# Patient Record
Sex: MALE | Race: BLACK OR AFRICAN AMERICAN | NOT HISPANIC OR LATINO | URBAN - METROPOLITAN AREA
[De-identification: names, ages, dates, MRNs, and addresses within clinical notes are randomized per-mention and may not be internally consistent; named-entity substitution may affect disease eponyms.]

---

## 2023-10-06 ENCOUNTER — HOSPITAL ENCOUNTER (EMERGENCY)
Facility: HOSPITAL | Age: 83
Discharge: HOME | End: 2023-10-06
Attending: EMERGENCY MEDICINE
Payer: COMMERCIAL

## 2023-10-06 ENCOUNTER — APPOINTMENT (OUTPATIENT)
Dept: RADIOLOGY | Facility: HOSPITAL | Age: 83
End: 2023-10-06
Payer: COMMERCIAL

## 2023-10-06 VITALS
HEART RATE: 71 BPM | WEIGHT: 182 LBS | SYSTOLIC BLOOD PRESSURE: 141 MMHG | OXYGEN SATURATION: 100 % | DIASTOLIC BLOOD PRESSURE: 84 MMHG | HEIGHT: 67 IN | TEMPERATURE: 97.2 F | BODY MASS INDEX: 28.56 KG/M2 | RESPIRATION RATE: 18 BRPM

## 2023-10-06 DIAGNOSIS — F03.A0 MILD DEMENTIA WITHOUT BEHAVIORAL DISTURBANCE, PSYCHOTIC DISTURBANCE, MOOD DISTURBANCE, OR ANXIETY, UNSPECIFIED DEMENTIA TYPE (MULTI): Primary | ICD-10-CM

## 2023-10-06 LAB
ALBUMIN SERPL BCP-MCNC: 4.3 G/DL (ref 3.4–5)
ALP SERPL-CCNC: 103 U/L (ref 33–136)
ALT SERPL W P-5'-P-CCNC: 10 U/L (ref 10–52)
ANION GAP SERPL CALC-SCNC: 16 MMOL/L (ref 10–20)
APPEARANCE UR: CLEAR
AST SERPL W P-5'-P-CCNC: 15 U/L (ref 9–39)
BASOPHILS # BLD AUTO: 0.04 X10*3/UL (ref 0–0.1)
BASOPHILS NFR BLD AUTO: 0.5 %
BILIRUB SERPL-MCNC: 0.6 MG/DL (ref 0–1.2)
BILIRUB UR STRIP.AUTO-MCNC: NEGATIVE MG/DL
BUN SERPL-MCNC: 7 MG/DL (ref 6–23)
CALCIUM SERPL-MCNC: 8.7 MG/DL (ref 8.6–10.3)
CARDIAC TROPONIN I PNL SERPL HS: 4 NG/L (ref 0–20)
CHLORIDE SERPL-SCNC: 104 MMOL/L (ref 98–107)
CO2 SERPL-SCNC: 26 MMOL/L (ref 21–32)
COLOR UR: YELLOW
CREAT SERPL-MCNC: 0.84 MG/DL (ref 0.5–1.3)
EOSINOPHIL # BLD AUTO: 0.21 X10*3/UL (ref 0–0.4)
EOSINOPHIL NFR BLD AUTO: 2.7 %
ERYTHROCYTE [DISTWIDTH] IN BLOOD BY AUTOMATED COUNT: 13.6 % (ref 11.5–14.5)
FLUAV RNA RESP QL NAA+PROBE: NOT DETECTED
FLUBV RNA RESP QL NAA+PROBE: NOT DETECTED
GFR SERPL CREATININE-BSD FRML MDRD: 87 ML/MIN/1.73M*2
GLUCOSE SERPL-MCNC: 213 MG/DL (ref 74–99)
GLUCOSE UR STRIP.AUTO-MCNC: NEGATIVE MG/DL
HCT VFR BLD AUTO: 35.2 % (ref 41–52)
HGB BLD-MCNC: 12.2 G/DL (ref 13.5–17.5)
IMM GRANULOCYTES # BLD AUTO: 0.04 X10*3/UL (ref 0–0.5)
IMM GRANULOCYTES NFR BLD AUTO: 0.5 % (ref 0–0.9)
KETONES UR STRIP.AUTO-MCNC: NEGATIVE MG/DL
LEUKOCYTE ESTERASE UR QL STRIP.AUTO: NEGATIVE
LYMPHOCYTES # BLD AUTO: 2.93 X10*3/UL (ref 0.8–3)
LYMPHOCYTES NFR BLD AUTO: 37.5 %
MCH RBC QN AUTO: 27.8 PG (ref 26–34)
MCHC RBC AUTO-ENTMCNC: 34.7 G/DL (ref 32–36)
MCV RBC AUTO: 80 FL (ref 80–100)
MONOCYTES # BLD AUTO: 0.64 X10*3/UL (ref 0.05–0.8)
MONOCYTES NFR BLD AUTO: 8.2 %
NEUTROPHILS # BLD AUTO: 3.96 X10*3/UL (ref 1.6–5.5)
NEUTROPHILS NFR BLD AUTO: 50.6 %
NITRITE UR QL STRIP.AUTO: NEGATIVE
NRBC BLD-RTO: 0 /100 WBCS (ref 0–0)
PH UR STRIP.AUTO: 7 [PH]
PLATELET # BLD AUTO: 286 X10*3/UL (ref 150–450)
PMV BLD AUTO: 10.7 FL (ref 7.5–11.5)
POTASSIUM SERPL-SCNC: 3.8 MMOL/L (ref 3.5–5.3)
PROT SERPL-MCNC: 6.4 G/DL (ref 6.4–8.2)
PROT UR STRIP.AUTO-MCNC: NEGATIVE MG/DL
RBC # BLD AUTO: 4.39 X10*6/UL (ref 4.5–5.9)
RBC # UR STRIP.AUTO: NEGATIVE /UL
SARS-COV-2 RNA RESP QL NAA+PROBE: NOT DETECTED
SODIUM SERPL-SCNC: 142 MMOL/L (ref 136–145)
SP GR UR STRIP.AUTO: 1.01
UROBILINOGEN UR STRIP.AUTO-MCNC: <2 MG/DL
WBC # BLD AUTO: 7.8 X10*3/UL (ref 4.4–11.3)

## 2023-10-06 PROCEDURE — 87636 SARSCOV2 & INF A&B AMP PRB: CPT | Performed by: EMERGENCY MEDICINE

## 2023-10-06 PROCEDURE — 36415 COLL VENOUS BLD VENIPUNCTURE: CPT | Performed by: EMERGENCY MEDICINE

## 2023-10-06 PROCEDURE — 84075 ASSAY ALKALINE PHOSPHATASE: CPT | Performed by: EMERGENCY MEDICINE

## 2023-10-06 PROCEDURE — 71045 X-RAY EXAM CHEST 1 VIEW: CPT | Performed by: RADIOLOGY

## 2023-10-06 PROCEDURE — 85025 COMPLETE CBC W/AUTO DIFF WBC: CPT | Performed by: EMERGENCY MEDICINE

## 2023-10-06 PROCEDURE — 84484 ASSAY OF TROPONIN QUANT: CPT | Performed by: EMERGENCY MEDICINE

## 2023-10-06 PROCEDURE — 70450 CT HEAD/BRAIN W/O DYE: CPT | Mod: MG

## 2023-10-06 PROCEDURE — 71045 X-RAY EXAM CHEST 1 VIEW: CPT

## 2023-10-06 PROCEDURE — 81003 URINALYSIS AUTO W/O SCOPE: CPT | Performed by: EMERGENCY MEDICINE

## 2023-10-06 PROCEDURE — 70450 CT HEAD/BRAIN W/O DYE: CPT | Performed by: RADIOLOGY

## 2023-10-06 PROCEDURE — 99284 EMERGENCY DEPT VISIT MOD MDM: CPT | Performed by: EMERGENCY MEDICINE

## 2023-10-06 ASSESSMENT — COLUMBIA-SUICIDE SEVERITY RATING SCALE - C-SSRS
6. HAVE YOU EVER DONE ANYTHING, STARTED TO DO ANYTHING, OR PREPARED TO DO ANYTHING TO END YOUR LIFE?: NO
1. IN THE PAST MONTH, HAVE YOU WISHED YOU WERE DEAD OR WISHED YOU COULD GO TO SLEEP AND NOT WAKE UP?: NO
2. HAVE YOU ACTUALLY HAD ANY THOUGHTS OF KILLING YOURSELF?: NO

## 2023-10-06 ASSESSMENT — PAIN - FUNCTIONAL ASSESSMENT: PAIN_FUNCTIONAL_ASSESSMENT: 0-10

## 2023-10-06 ASSESSMENT — PAIN SCALES - GENERAL: PAINLEVEL_OUTOF10: 0 - NO PAIN

## 2023-10-06 NOTE — ED PROVIDER NOTES
HPI   Chief Complaint   Patient presents with    Dizziness     x1week    Altered Mental Status     [id number]       83-year-old man brought in by family with increased confusion.  Patient lives alone and family is concerned that he seems to be more confused.  He has been found wandering in the streets.  He has no complaints.  No fever or chills.  No chest pain or shortness of breath or abdominal pain.      I ordered testing to look for reversible causes of his confusion.  Patient states labs are within normal limits including normal CBC, normal urinalysis and normal electrolytes and liver function test.  I will explain to family that he does not have any medical reason to be hospitalized and that they may need to consider additional services if he is going to consider staying in his home.                    No data recorded                Patient History   Past Medical History:   Diagnosis Date    Diabetes mellitus (CMS/HCC)     Hypertension      Past Surgical History:   Procedure Laterality Date    CT HEAD ANGIO W AND WO IV CONTRAST  3/2/2017    CT HEAD ANGIO W AND WO IV CONTRAST [place] [facility] [place]    CT NECK ANGIO W AND WO IV CONTRAST  3/2/2017    CT NECK ANGIO W AND WO IV CONTRAST [place]2/[place] [facility] [place]     No family history on file.  Social History     Tobacco Use    Smoking status: Former     Types: Cigarettes    Smokeless tobacco: Never   Substance Use Topics    Alcohol use: Not on file    Drug use: Not on file       Physical Exam   ED Triage Vitals [10/06/23 0906]   Temp Heart Rate Resp BP   36.6 °C (97.9 °F) 71 20 144/79      SpO2 Temp Source Heart Rate Source Patient Position   98 % Oral -- Sitting      BP Location FiO2 (%)     Left arm --       Physical Exam  Vitals and nursing note reviewed.   Constitutional:       General: He is not in acute distress.     Appearance: Normal appearance. He is not toxic-appearing.   HENT:      Head: Normocephalic.      Mouth/Throat:      Mouth: Mucous  membranes are moist.   Eyes:      Conjunctiva/sclera: Conjunctivae normal.      Pupils: Pupils are equal, round, and reactive to light.   Cardiovascular:      Rate and Rhythm: Normal rate and regular rhythm.      Pulses:           Radial pulses are 2+ on the right side and 2+ on the left side.   Pulmonary:      Effort: Pulmonary effort is normal. No respiratory distress.      Breath sounds: Normal breath sounds.   Abdominal:      General: Abdomen is flat.      Palpations: Abdomen is soft.      Tenderness: There is no abdominal tenderness. There is no guarding or rebound.   Musculoskeletal:      Cervical back: Normal range of motion and neck supple.      Right lower leg: No edema.      Left lower leg: No edema.   Skin:     General: Skin is warm.   Neurological:      Mental Status: He is alert and oriented to person, place, and time.   Psychiatric:         Mood and Affect: Mood normal.         ED Course & MDM   ED Course as of 10/06/23 1601   Fri Oct 06, 2023   1554 XR chest 1 view [JG]   1600 See note [JG]      ED Course User Index  [JG] Agatha Benz MD         Diagnoses as of 10/06/23 1601   Mild dementia without behavioral disturbance, psychotic disturbance, mood disturbance, or anxiety, unspecified dementia type (CMS/HCC)       Medical Decision Making  EKG my interpretation:  NSR 75, nl axis, no st elevations or depression        Procedure  Procedures     Agatha Benz MD  10/06/23 1602       Agatha Benz MD  11/11/23 1742

## 2023-10-06 NOTE — ED TRIAGE NOTES
Per niece patient is having some confusion for the past month.(Getting lost, decline in self care) Pt c/o dizziness for the past week. Double vision x1week. No appetite.

## 2024-04-04 ENCOUNTER — APPOINTMENT (OUTPATIENT)
Dept: RADIOLOGY | Facility: HOSPITAL | Age: 84
End: 2024-04-04
Payer: COMMERCIAL

## 2024-04-04 ENCOUNTER — HOSPITAL ENCOUNTER (OUTPATIENT)
Facility: HOSPITAL | Age: 84
Setting detail: OBSERVATION
Discharge: NURSING, REHAB OR LONG-TERM CARE | End: 2024-04-10
Attending: EMERGENCY MEDICINE | Admitting: NURSE PRACTITIONER
Payer: COMMERCIAL

## 2024-04-04 DIAGNOSIS — R41.0 DISORIENTATION: Primary | ICD-10-CM

## 2024-04-04 PROBLEM — R41.82 ALTERED MENTAL STATUS: Status: ACTIVE | Noted: 2024-04-04

## 2024-04-04 LAB
25(OH)D3 SERPL-MCNC: 13 NG/ML (ref 30–100)
ALBUMIN SERPL BCP-MCNC: 4.1 G/DL (ref 3.4–5)
ALP SERPL-CCNC: 164 U/L (ref 33–136)
ALT SERPL W P-5'-P-CCNC: 16 U/L (ref 10–52)
AMPHETAMINES UR QL SCN: NORMAL
ANION GAP SERPL CALC-SCNC: 12 MMOL/L (ref 10–20)
APAP SERPL-MCNC: <10 UG/ML
APPEARANCE UR: CLEAR
AST SERPL W P-5'-P-CCNC: 15 U/L (ref 9–39)
BARBITURATES UR QL SCN: NORMAL
BASOPHILS # BLD AUTO: 0.06 X10*3/UL (ref 0–0.1)
BASOPHILS NFR BLD AUTO: 0.7 %
BENZODIAZ UR QL SCN: NORMAL
BILIRUB SERPL-MCNC: 0.3 MG/DL (ref 0–1.2)
BILIRUB UR STRIP.AUTO-MCNC: NEGATIVE MG/DL
BUN SERPL-MCNC: 8 MG/DL (ref 6–23)
BZE UR QL SCN: NORMAL
CALCIUM SERPL-MCNC: 9 MG/DL (ref 8.6–10.6)
CANNABINOIDS UR QL SCN: NORMAL
CHLORIDE SERPL-SCNC: 102 MMOL/L (ref 98–107)
CO2 SERPL-SCNC: 27 MMOL/L (ref 21–32)
COLOR UR: ABNORMAL
CREAT SERPL-MCNC: 0.88 MG/DL (ref 0.5–1.3)
EGFRCR SERPLBLD CKD-EPI 2021: 86 ML/MIN/1.73M*2
EOSINOPHIL # BLD AUTO: 0.2 X10*3/UL (ref 0–0.4)
EOSINOPHIL NFR BLD AUTO: 2.2 %
ERYTHROCYTE [DISTWIDTH] IN BLOOD BY AUTOMATED COUNT: 12.7 % (ref 11.5–14.5)
EST. AVERAGE GLUCOSE BLD GHB EST-MCNC: 306 MG/DL
ETHANOL SERPL-MCNC: <10 MG/DL
FENTANYL+NORFENTANYL UR QL SCN: NORMAL
FLUAV RNA RESP QL NAA+PROBE: NOT DETECTED
FLUBV RNA RESP QL NAA+PROBE: NOT DETECTED
GLUCOSE BLD MANUAL STRIP-MCNC: 295 MG/DL (ref 74–99)
GLUCOSE SERPL-MCNC: 381 MG/DL (ref 74–99)
GLUCOSE UR STRIP.AUTO-MCNC: ABNORMAL MG/DL
HBA1C MFR BLD: 12.3 %
HCT VFR BLD AUTO: 36.5 % (ref 41–52)
HGB BLD-MCNC: 12.7 G/DL (ref 13.5–17.5)
IMM GRANULOCYTES # BLD AUTO: 0.04 X10*3/UL (ref 0–0.5)
IMM GRANULOCYTES NFR BLD AUTO: 0.4 % (ref 0–0.9)
INR PPP: 1 (ref 0.9–1.1)
KETONES UR STRIP.AUTO-MCNC: NEGATIVE MG/DL
LEUKOCYTE ESTERASE UR QL STRIP.AUTO: ABNORMAL
LYMPHOCYTES # BLD AUTO: 3.46 X10*3/UL (ref 0.8–3)
LYMPHOCYTES NFR BLD AUTO: 38.5 %
MAGNESIUM SERPL-MCNC: 1.92 MG/DL (ref 1.6–2.4)
MCH RBC QN AUTO: 26.5 PG (ref 26–34)
MCHC RBC AUTO-ENTMCNC: 34.8 G/DL (ref 32–36)
MCV RBC AUTO: 76 FL (ref 80–100)
METHADONE UR QL SCN: NORMAL
MONOCYTES # BLD AUTO: 0.67 X10*3/UL (ref 0.05–0.8)
MONOCYTES NFR BLD AUTO: 7.5 %
NEUTROPHILS # BLD AUTO: 4.56 X10*3/UL (ref 1.6–5.5)
NEUTROPHILS NFR BLD AUTO: 50.7 %
NITRITE UR QL STRIP.AUTO: NEGATIVE
NRBC BLD-RTO: 0 /100 WBCS (ref 0–0)
OPIATES UR QL SCN: NORMAL
OXYCODONE+OXYMORPHONE UR QL SCN: NORMAL
PCP UR QL SCN: NORMAL
PH UR STRIP.AUTO: 7 [PH]
PHOSPHATE SERPL-MCNC: 3.4 MG/DL (ref 2.5–4.9)
PLATELET # BLD AUTO: 257 X10*3/UL (ref 150–450)
POTASSIUM SERPL-SCNC: 4.4 MMOL/L (ref 3.5–5.3)
PROT SERPL-MCNC: 6.3 G/DL (ref 6.4–8.2)
PROT UR STRIP.AUTO-MCNC: NEGATIVE MG/DL
PROTHROMBIN TIME: 10.7 SECONDS (ref 9.8–12.8)
RBC # BLD AUTO: 4.8 X10*6/UL (ref 4.5–5.9)
RBC # UR STRIP.AUTO: NEGATIVE /UL
RBC #/AREA URNS AUTO: ABNORMAL /HPF
SALICYLATES SERPL-MCNC: <3 MG/DL
SARS-COV-2 RNA RESP QL NAA+PROBE: NOT DETECTED
SODIUM SERPL-SCNC: 137 MMOL/L (ref 136–145)
SP GR UR STRIP.AUTO: 1.02
SQUAMOUS #/AREA URNS AUTO: ABNORMAL /HPF
TSH SERPL-ACNC: 2.06 MIU/L (ref 0.44–3.98)
UROBILINOGEN UR STRIP.AUTO-MCNC: NORMAL MG/DL
VIT B12 SERPL-MCNC: 245 PG/ML (ref 211–911)
WBC # BLD AUTO: 9 X10*3/UL (ref 4.4–11.3)
WBC #/AREA URNS AUTO: ABNORMAL /HPF

## 2024-04-04 PROCEDURE — 87086 URINE CULTURE/COLONY COUNT: CPT | Performed by: STUDENT IN AN ORGANIZED HEALTH CARE EDUCATION/TRAINING PROGRAM

## 2024-04-04 PROCEDURE — 85610 PROTHROMBIN TIME: CPT | Performed by: STUDENT IN AN ORGANIZED HEALTH CARE EDUCATION/TRAINING PROGRAM

## 2024-04-04 PROCEDURE — 84443 ASSAY THYROID STIM HORMONE: CPT | Performed by: NURSE PRACTITIONER

## 2024-04-04 PROCEDURE — 84100 ASSAY OF PHOSPHORUS: CPT | Performed by: STUDENT IN AN ORGANIZED HEALTH CARE EDUCATION/TRAINING PROGRAM

## 2024-04-04 PROCEDURE — 82607 VITAMIN B-12: CPT | Performed by: NURSE PRACTITIONER

## 2024-04-04 PROCEDURE — A9575 INJ GADOTERATE MEGLUMI 0.1ML: HCPCS | Performed by: EMERGENCY MEDICINE

## 2024-04-04 PROCEDURE — 82306 VITAMIN D 25 HYDROXY: CPT | Performed by: NURSE PRACTITIONER

## 2024-04-04 PROCEDURE — 2500000004 HC RX 250 GENERAL PHARMACY W/ HCPCS (ALT 636 FOR OP/ED): Mod: 59 | Performed by: NURSE PRACTITIONER

## 2024-04-04 PROCEDURE — 96372 THER/PROPH/DIAG INJ SC/IM: CPT | Performed by: NURSE PRACTITIONER

## 2024-04-04 PROCEDURE — G0378 HOSPITAL OBSERVATION PER HR: HCPCS

## 2024-04-04 PROCEDURE — 2500000001 HC RX 250 WO HCPCS SELF ADMINISTERED DRUGS (ALT 637 FOR MEDICARE OP): Performed by: NURSE PRACTITIONER

## 2024-04-04 PROCEDURE — 80053 COMPREHEN METABOLIC PANEL: CPT | Performed by: STUDENT IN AN ORGANIZED HEALTH CARE EDUCATION/TRAINING PROGRAM

## 2024-04-04 PROCEDURE — 99285 EMERGENCY DEPT VISIT HI MDM: CPT | Performed by: EMERGENCY MEDICINE

## 2024-04-04 PROCEDURE — 80143 DRUG ASSAY ACETAMINOPHEN: CPT | Performed by: NURSE PRACTITIONER

## 2024-04-04 PROCEDURE — 81001 URINALYSIS AUTO W/SCOPE: CPT | Performed by: STUDENT IN AN ORGANIZED HEALTH CARE EDUCATION/TRAINING PROGRAM

## 2024-04-04 PROCEDURE — 36415 COLL VENOUS BLD VENIPUNCTURE: CPT | Performed by: STUDENT IN AN ORGANIZED HEALTH CARE EDUCATION/TRAINING PROGRAM

## 2024-04-04 PROCEDURE — 83735 ASSAY OF MAGNESIUM: CPT | Performed by: STUDENT IN AN ORGANIZED HEALTH CARE EDUCATION/TRAINING PROGRAM

## 2024-04-04 PROCEDURE — 87636 SARSCOV2 & INF A&B AMP PRB: CPT | Performed by: STUDENT IN AN ORGANIZED HEALTH CARE EDUCATION/TRAINING PROGRAM

## 2024-04-04 PROCEDURE — 70553 MRI BRAIN STEM W/O & W/DYE: CPT

## 2024-04-04 PROCEDURE — 82947 ASSAY GLUCOSE BLOOD QUANT: CPT

## 2024-04-04 PROCEDURE — 71045 X-RAY EXAM CHEST 1 VIEW: CPT

## 2024-04-04 PROCEDURE — 71045 X-RAY EXAM CHEST 1 VIEW: CPT | Mod: FOREIGN READ | Performed by: RADIOLOGY

## 2024-04-04 PROCEDURE — 99222 1ST HOSP IP/OBS MODERATE 55: CPT | Performed by: NURSE PRACTITIONER

## 2024-04-04 PROCEDURE — 70553 MRI BRAIN STEM W/O & W/DYE: CPT | Performed by: RADIOLOGY

## 2024-04-04 PROCEDURE — 2500000002 HC RX 250 W HCPCS SELF ADMINISTERED DRUGS (ALT 637 FOR MEDICARE OP, ALT 636 FOR OP/ED): Mod: MUE | Performed by: NURSE PRACTITIONER

## 2024-04-04 PROCEDURE — 85025 COMPLETE CBC W/AUTO DIFF WBC: CPT | Performed by: STUDENT IN AN ORGANIZED HEALTH CARE EDUCATION/TRAINING PROGRAM

## 2024-04-04 PROCEDURE — 2550000001 HC RX 255 CONTRASTS: Performed by: EMERGENCY MEDICINE

## 2024-04-04 PROCEDURE — 99285 EMERGENCY DEPT VISIT HI MDM: CPT | Mod: 25

## 2024-04-04 PROCEDURE — 83036 HEMOGLOBIN GLYCOSYLATED A1C: CPT | Performed by: NURSE PRACTITIONER

## 2024-04-04 PROCEDURE — 80307 DRUG TEST PRSMV CHEM ANLYZR: CPT | Performed by: NURSE PRACTITIONER

## 2024-04-04 RX ORDER — AMLODIPINE BESYLATE 5 MG/1
5 TABLET ORAL DAILY
Status: DISCONTINUED | OUTPATIENT
Start: 2024-04-05 | End: 2024-04-10 | Stop reason: HOSPADM

## 2024-04-04 RX ORDER — GADOTERATE MEGLUMINE 376.9 MG/ML
20 INJECTION INTRAVENOUS
Status: COMPLETED | OUTPATIENT
Start: 2024-04-04 | End: 2024-04-04

## 2024-04-04 RX ORDER — INSULIN LISPRO 100 [IU]/ML
0-10 INJECTION, SOLUTION INTRAVENOUS; SUBCUTANEOUS
Status: DISCONTINUED | OUTPATIENT
Start: 2024-04-05 | End: 2024-04-10 | Stop reason: HOSPADM

## 2024-04-04 RX ORDER — DEXTROSE 50 % IN WATER (D50W) INTRAVENOUS SYRINGE
12.5
Status: DISCONTINUED | OUTPATIENT
Start: 2024-04-04 | End: 2024-04-10 | Stop reason: HOSPADM

## 2024-04-04 RX ORDER — ACETAMINOPHEN 325 MG/1
650 TABLET ORAL EVERY 6 HOURS PRN
Status: DISCONTINUED | OUTPATIENT
Start: 2024-04-04 | End: 2024-04-10 | Stop reason: HOSPADM

## 2024-04-04 RX ORDER — ACETAMINOPHEN 500 MG
5 TABLET ORAL NIGHTLY PRN
Status: DISCONTINUED | OUTPATIENT
Start: 2024-04-04 | End: 2024-04-10 | Stop reason: HOSPADM

## 2024-04-04 RX ORDER — ATORVASTATIN CALCIUM 40 MG/1
40 TABLET, FILM COATED ORAL NIGHTLY
Status: DISCONTINUED | OUTPATIENT
Start: 2024-04-04 | End: 2024-04-10 | Stop reason: HOSPADM

## 2024-04-04 RX ORDER — AMLODIPINE BESYLATE 5 MG/1
1 TABLET ORAL DAILY
COMMUNITY

## 2024-04-04 RX ORDER — TAMSULOSIN HYDROCHLORIDE 0.4 MG/1
0.4 CAPSULE ORAL NIGHTLY
COMMUNITY
Start: 2014-07-14

## 2024-04-04 RX ORDER — ENOXAPARIN SODIUM 100 MG/ML
40 INJECTION SUBCUTANEOUS EVERY 24 HOURS
Status: DISCONTINUED | OUTPATIENT
Start: 2024-04-04 | End: 2024-04-10 | Stop reason: HOSPADM

## 2024-04-04 RX ORDER — LISINOPRIL 40 MG/1
1 TABLET ORAL DAILY
COMMUNITY

## 2024-04-04 RX ORDER — TAMSULOSIN HYDROCHLORIDE 0.4 MG/1
0.4 CAPSULE ORAL NIGHTLY
Status: DISCONTINUED | OUTPATIENT
Start: 2024-04-04 | End: 2024-04-10 | Stop reason: HOSPADM

## 2024-04-04 RX ORDER — ARIPIPRAZOLE 2 MG/1
2 TABLET ORAL DAILY
Status: DISCONTINUED | OUTPATIENT
Start: 2024-04-05 | End: 2024-04-10 | Stop reason: HOSPADM

## 2024-04-04 RX ORDER — LISINOPRIL 20 MG/1
40 TABLET ORAL DAILY
Status: DISCONTINUED | OUTPATIENT
Start: 2024-04-05 | End: 2024-04-10 | Stop reason: HOSPADM

## 2024-04-04 RX ORDER — DEXTROSE 50 % IN WATER (D50W) INTRAVENOUS SYRINGE
25
Status: DISCONTINUED | OUTPATIENT
Start: 2024-04-04 | End: 2024-04-10 | Stop reason: HOSPADM

## 2024-04-04 RX ORDER — ATORVASTATIN CALCIUM 40 MG/1
40 TABLET, FILM COATED ORAL NIGHTLY
COMMUNITY
Start: 2022-05-04

## 2024-04-04 RX ORDER — ARIPIPRAZOLE 2 MG/1
1 TABLET ORAL DAILY
COMMUNITY

## 2024-04-04 RX ORDER — METFORMIN HYDROCHLORIDE 1000 MG/1
1000 TABLET ORAL
COMMUNITY

## 2024-04-04 RX ORDER — POLYETHYLENE GLYCOL 3350 17 G/17G
17 POWDER, FOR SOLUTION ORAL DAILY PRN
Status: DISCONTINUED | OUTPATIENT
Start: 2024-04-04 | End: 2024-04-10 | Stop reason: HOSPADM

## 2024-04-04 RX ADMIN — GADOTERATE MEGLUMINE 16 ML: 376.9 INJECTION INTRAVENOUS at 18:54

## 2024-04-04 RX ADMIN — TAMSULOSIN HYDROCHLORIDE 0.4 MG: 0.4 CAPSULE ORAL at 22:33

## 2024-04-04 RX ADMIN — ATORVASTATIN CALCIUM 40 MG: 40 TABLET, FILM COATED ORAL at 22:33

## 2024-04-04 RX ADMIN — ENOXAPARIN SODIUM 40 MG: 100 INJECTION SUBCUTANEOUS at 22:32

## 2024-04-04 SDOH — SOCIAL STABILITY: SOCIAL INSECURITY: ARE THERE ANY APPARENT SIGNS OF INJURIES/BEHAVIORS THAT COULD BE RELATED TO ABUSE/NEGLECT?: NO

## 2024-04-04 SDOH — SOCIAL STABILITY: SOCIAL INSECURITY: DO YOU FEEL UNSAFE GOING BACK TO THE PLACE WHERE YOU ARE LIVING?: NO

## 2024-04-04 SDOH — SOCIAL STABILITY: SOCIAL INSECURITY: HAS ANYONE EVER THREATENED TO HURT YOUR FAMILY OR YOUR PETS?: NO

## 2024-04-04 SDOH — SOCIAL STABILITY: SOCIAL INSECURITY: HAVE YOU HAD THOUGHTS OF HARMING ANYONE ELSE?: NO

## 2024-04-04 SDOH — SOCIAL STABILITY: SOCIAL INSECURITY: DO YOU FEEL ANYONE HAS EXPLOITED OR TAKEN ADVANTAGE OF YOU FINANCIALLY OR OF YOUR PERSONAL PROPERTY?: NO

## 2024-04-04 SDOH — SOCIAL STABILITY: SOCIAL INSECURITY: ABUSE: ADULT

## 2024-04-04 SDOH — SOCIAL STABILITY: SOCIAL INSECURITY: ARE YOU OR HAVE YOU BEEN THREATENED OR ABUSED PHYSICALLY, EMOTIONALLY, OR SEXUALLY BY ANYONE?: NO

## 2024-04-04 SDOH — SOCIAL STABILITY: SOCIAL INSECURITY: DOES ANYONE TRY TO KEEP YOU FROM HAVING/CONTACTING OTHER FRIENDS OR DOING THINGS OUTSIDE YOUR HOME?: NO

## 2024-04-04 ASSESSMENT — COGNITIVE AND FUNCTIONAL STATUS - GENERAL
WALKING IN HOSPITAL ROOM: A LITTLE
STANDING UP FROM CHAIR USING ARMS: A LITTLE
DAILY ACTIVITIY SCORE: 22
TOILETING: A LITTLE
CLIMB 3 TO 5 STEPS WITH RAILING: A LITTLE
MOBILITY SCORE: 20
MOVING TO AND FROM BED TO CHAIR: A LITTLE
PATIENT BASELINE BEDBOUND: NO
HELP NEEDED FOR BATHING: A LITTLE

## 2024-04-04 ASSESSMENT — PATIENT HEALTH QUESTIONNAIRE - PHQ9
SUM OF ALL RESPONSES TO PHQ9 QUESTIONS 1 & 2: 0
2. FEELING DOWN, DEPRESSED OR HOPELESS: NOT AT ALL
1. LITTLE INTEREST OR PLEASURE IN DOING THINGS: NOT AT ALL

## 2024-04-04 ASSESSMENT — LIFESTYLE VARIABLES
AUDIT-C TOTAL SCORE: 2
HAVE PEOPLE ANNOYED YOU BY CRITICIZING YOUR DRINKING: NO
HOW OFTEN DO YOU HAVE 6 OR MORE DRINKS ON ONE OCCASION: NEVER
EVER FELT BAD OR GUILTY ABOUT YOUR DRINKING: NO
SUBSTANCE_ABUSE_PAST_12_MONTHS: NO
TOTAL SCORE: 0
HAVE YOU EVER FELT YOU SHOULD CUT DOWN ON YOUR DRINKING: NO
SKIP TO QUESTIONS 9-10: 1
HOW MANY STANDARD DRINKS CONTAINING ALCOHOL DO YOU HAVE ON A TYPICAL DAY: 1 OR 2
AUDIT-C TOTAL SCORE: 2
EVER HAD A DRINK FIRST THING IN THE MORNING TO STEADY YOUR NERVES TO GET RID OF A HANGOVER: NO
HOW OFTEN DO YOU HAVE A DRINK CONTAINING ALCOHOL: 2-4 TIMES A MONTH
PRESCIPTION_ABUSE_PAST_12_MONTHS: NO

## 2024-04-04 ASSESSMENT — ACTIVITIES OF DAILY LIVING (ADL)
HEARING - RIGHT EAR: FUNCTIONAL
WALKS IN HOME: INDEPENDENT
BATHING: NEEDS ASSISTANCE
DRESSING YOURSELF: INDEPENDENT
BATHING: NEEDS ASSISTANCE
LACK_OF_TRANSPORTATION: NO
HEARING - LEFT EAR: FUNCTIONAL
PATIENT'S MEMORY ADEQUATE TO SAFELY COMPLETE DAILY ACTIVITIES?: YES
GROOMING: NEEDS ASSISTANCE
TOILETING: NEEDS ASSISTANCE
FEEDING YOURSELF: INDEPENDENT
ASSISTIVE_DEVICE: EYEGLASSES;CANE
ADEQUATE_TO_COMPLETE_ADL: YES
JUDGMENT_ADEQUATE_SAFELY_COMPLETE_DAILY_ACTIVITIES: YES

## 2024-04-04 ASSESSMENT — PAIN SCALES - GENERAL
PAINLEVEL_OUTOF10: 0 - NO PAIN
PAINLEVEL_OUTOF10: 0 - NO PAIN

## 2024-04-04 ASSESSMENT — COLUMBIA-SUICIDE SEVERITY RATING SCALE - C-SSRS
1. IN THE PAST MONTH, HAVE YOU WISHED YOU WERE DEAD OR WISHED YOU COULD GO TO SLEEP AND NOT WAKE UP?: NO
6. HAVE YOU EVER DONE ANYTHING, STARTED TO DO ANYTHING, OR PREPARED TO DO ANYTHING TO END YOUR LIFE?: NO
6. HAVE YOU EVER DONE ANYTHING, STARTED TO DO ANYTHING, OR PREPARED TO DO ANYTHING TO END YOUR LIFE?: NO
2. HAVE YOU ACTUALLY HAD ANY THOUGHTS OF KILLING YOURSELF?: NO
1. IN THE PAST MONTH, HAVE YOU WISHED YOU WERE DEAD OR WISHED YOU COULD GO TO SLEEP AND NOT WAKE UP?: NO
2. HAVE YOU ACTUALLY HAD ANY THOUGHTS OF KILLING YOURSELF?: NO

## 2024-04-04 ASSESSMENT — PAIN - FUNCTIONAL ASSESSMENT: PAIN_FUNCTIONAL_ASSESSMENT: 0-10

## 2024-04-04 NOTE — ED TRIAGE NOTES
Pt presents to ED by Howard BRISENO due to wellness check from his nice. As per PD, niece states he has a brain tumor and was not acting like his normal self. As per police, neighbors have shown concerns about him falling a lot. Pt is Aox2. Pt disoriented to time and situation.

## 2024-04-05 LAB
ALBUMIN SERPL BCP-MCNC: 3.6 G/DL (ref 3.4–5)
AMMONIA PLAS-SCNC: 39 UMOL/L (ref 16–53)
ANION GAP SERPL CALC-SCNC: 13 MMOL/L (ref 10–20)
BACTERIA UR CULT: ABNORMAL
BASOPHILS # BLD AUTO: 0.03 X10*3/UL (ref 0–0.1)
BASOPHILS NFR BLD AUTO: 0.4 %
BUN SERPL-MCNC: 8 MG/DL (ref 6–23)
CALCIUM SERPL-MCNC: 8.9 MG/DL (ref 8.6–10.6)
CHLORIDE SERPL-SCNC: 103 MMOL/L (ref 98–107)
CO2 SERPL-SCNC: 24 MMOL/L (ref 21–32)
CREAT SERPL-MCNC: 0.77 MG/DL (ref 0.5–1.3)
CRP SERPL-MCNC: 0.25 MG/DL
EGFRCR SERPLBLD CKD-EPI 2021: 89 ML/MIN/1.73M*2
EOSINOPHIL # BLD AUTO: 0.2 X10*3/UL (ref 0–0.4)
EOSINOPHIL NFR BLD AUTO: 2.6 %
ERYTHROCYTE [DISTWIDTH] IN BLOOD BY AUTOMATED COUNT: 13.1 % (ref 11.5–14.5)
ERYTHROCYTE [SEDIMENTATION RATE] IN BLOOD BY WESTERGREN METHOD: 5 MM/H (ref 0–20)
GLUCOSE BLD MANUAL STRIP-MCNC: 226 MG/DL (ref 74–99)
GLUCOSE BLD MANUAL STRIP-MCNC: 307 MG/DL (ref 74–99)
GLUCOSE BLD MANUAL STRIP-MCNC: 317 MG/DL (ref 74–99)
GLUCOSE BLD MANUAL STRIP-MCNC: 338 MG/DL (ref 74–99)
GLUCOSE SERPL-MCNC: 400 MG/DL (ref 74–99)
HCT VFR BLD AUTO: 34.1 % (ref 41–52)
HGB BLD-MCNC: 11.7 G/DL (ref 13.5–17.5)
HIV 1+2 AB+HIV1 P24 AG SERPL QL IA: NONREACTIVE
HOLD SPECIMEN: NORMAL
IMM GRANULOCYTES # BLD AUTO: 0.04 X10*3/UL (ref 0–0.5)
IMM GRANULOCYTES NFR BLD AUTO: 0.5 % (ref 0–0.9)
LYMPHOCYTES # BLD AUTO: 3.46 X10*3/UL (ref 0.8–3)
LYMPHOCYTES NFR BLD AUTO: 44.9 %
MAGNESIUM SERPL-MCNC: 1.85 MG/DL (ref 1.6–2.4)
MCH RBC QN AUTO: 26.8 PG (ref 26–34)
MCHC RBC AUTO-ENTMCNC: 34.3 G/DL (ref 32–36)
MCV RBC AUTO: 78 FL (ref 80–100)
MONOCYTES # BLD AUTO: 0.62 X10*3/UL (ref 0.05–0.8)
MONOCYTES NFR BLD AUTO: 8 %
NEUTROPHILS # BLD AUTO: 3.36 X10*3/UL (ref 1.6–5.5)
NEUTROPHILS NFR BLD AUTO: 43.6 %
NRBC BLD-RTO: 0 /100 WBCS (ref 0–0)
PHOSPHATE SERPL-MCNC: 2.7 MG/DL (ref 2.5–4.9)
PLATELET # BLD AUTO: 234 X10*3/UL (ref 150–450)
POTASSIUM SERPL-SCNC: 3.8 MMOL/L (ref 3.5–5.3)
PROCALCITONIN SERPL-MCNC: 0.04 NG/ML
RBC # BLD AUTO: 4.37 X10*6/UL (ref 4.5–5.9)
SODIUM SERPL-SCNC: 136 MMOL/L (ref 136–145)
TREPONEMA PALLIDUM IGG+IGM AB [PRESENCE] IN SERUM OR PLASMA BY IMMUNOASSAY: NONREACTIVE
WBC # BLD AUTO: 7.7 X10*3/UL (ref 4.4–11.3)

## 2024-04-05 PROCEDURE — 2500000002 HC RX 250 W HCPCS SELF ADMINISTERED DRUGS (ALT 637 FOR MEDICARE OP, ALT 636 FOR OP/ED): Performed by: STUDENT IN AN ORGANIZED HEALTH CARE EDUCATION/TRAINING PROGRAM

## 2024-04-05 PROCEDURE — G0378 HOSPITAL OBSERVATION PER HR: HCPCS

## 2024-04-05 PROCEDURE — 87389 HIV-1 AG W/HIV-1&-2 AB AG IA: CPT | Performed by: NURSE PRACTITIONER

## 2024-04-05 PROCEDURE — 2500000001 HC RX 250 WO HCPCS SELF ADMINISTERED DRUGS (ALT 637 FOR MEDICARE OP): Performed by: NURSE PRACTITIONER

## 2024-04-05 PROCEDURE — 2500000001 HC RX 250 WO HCPCS SELF ADMINISTERED DRUGS (ALT 637 FOR MEDICARE OP): Performed by: STUDENT IN AN ORGANIZED HEALTH CARE EDUCATION/TRAINING PROGRAM

## 2024-04-05 PROCEDURE — 84145 PROCALCITONIN (PCT): CPT | Performed by: NURSE PRACTITIONER

## 2024-04-05 PROCEDURE — 96372 THER/PROPH/DIAG INJ SC/IM: CPT | Performed by: NURSE PRACTITIONER

## 2024-04-05 PROCEDURE — 86780 TREPONEMA PALLIDUM: CPT | Performed by: NURSE PRACTITIONER

## 2024-04-05 PROCEDURE — 86140 C-REACTIVE PROTEIN: CPT | Performed by: NURSE PRACTITIONER

## 2024-04-05 PROCEDURE — 83735 ASSAY OF MAGNESIUM: CPT | Performed by: NURSE PRACTITIONER

## 2024-04-05 PROCEDURE — 97161 PT EVAL LOW COMPLEX 20 MIN: CPT | Mod: GP

## 2024-04-05 PROCEDURE — 85025 COMPLETE CBC W/AUTO DIFF WBC: CPT | Performed by: NURSE PRACTITIONER

## 2024-04-05 PROCEDURE — 82947 ASSAY GLUCOSE BLOOD QUANT: CPT | Mod: 91

## 2024-04-05 PROCEDURE — 82140 ASSAY OF AMMONIA: CPT | Performed by: NURSE PRACTITIONER

## 2024-04-05 PROCEDURE — 99233 SBSQ HOSP IP/OBS HIGH 50: CPT | Performed by: STUDENT IN AN ORGANIZED HEALTH CARE EDUCATION/TRAINING PROGRAM

## 2024-04-05 PROCEDURE — 2500000004 HC RX 250 GENERAL PHARMACY W/ HCPCS (ALT 636 FOR OP/ED): Performed by: NURSE PRACTITIONER

## 2024-04-05 PROCEDURE — 80069 RENAL FUNCTION PANEL: CPT | Performed by: NURSE PRACTITIONER

## 2024-04-05 PROCEDURE — 2500000002 HC RX 250 W HCPCS SELF ADMINISTERED DRUGS (ALT 637 FOR MEDICARE OP, ALT 636 FOR OP/ED): Mod: MUE | Performed by: STUDENT IN AN ORGANIZED HEALTH CARE EDUCATION/TRAINING PROGRAM

## 2024-04-05 PROCEDURE — 2500000002 HC RX 250 W HCPCS SELF ADMINISTERED DRUGS (ALT 637 FOR MEDICARE OP, ALT 636 FOR OP/ED): Performed by: NURSE PRACTITIONER

## 2024-04-05 PROCEDURE — 85652 RBC SED RATE AUTOMATED: CPT | Performed by: NURSE PRACTITIONER

## 2024-04-05 PROCEDURE — 36415 COLL VENOUS BLD VENIPUNCTURE: CPT | Performed by: NURSE PRACTITIONER

## 2024-04-05 RX ORDER — INSULIN GLARGINE 100 [IU]/ML
8 INJECTION, SOLUTION SUBCUTANEOUS EVERY 24 HOURS
Status: DISCONTINUED | OUTPATIENT
Start: 2024-04-05 | End: 2024-04-06

## 2024-04-05 RX ORDER — INSULIN GLARGINE 100 [IU]/ML
0.1 INJECTION, SOLUTION SUBCUTANEOUS NIGHTLY
Status: DISCONTINUED | OUTPATIENT
Start: 2024-04-05 | End: 2024-04-05

## 2024-04-05 RX ORDER — LANOLIN ALCOHOL/MO/W.PET/CERES
1000 CREAM (GRAM) TOPICAL DAILY
Status: DISCONTINUED | OUTPATIENT
Start: 2024-04-05 | End: 2024-04-10 | Stop reason: HOSPADM

## 2024-04-05 RX ORDER — CHOLECALCIFEROL (VITAMIN D3) 25 MCG
2000 TABLET ORAL DAILY
Status: DISCONTINUED | OUTPATIENT
Start: 2024-04-05 | End: 2024-04-10 | Stop reason: HOSPADM

## 2024-04-05 RX ORDER — INSULIN LISPRO 100 [IU]/ML
6 INJECTION, SOLUTION INTRAVENOUS; SUBCUTANEOUS ONCE
Status: COMPLETED | OUTPATIENT
Start: 2024-04-05 | End: 2024-04-05

## 2024-04-05 RX ADMIN — INSULIN LISPRO 6 UNITS: 100 INJECTION, SOLUTION INTRAVENOUS; SUBCUTANEOUS at 21:31

## 2024-04-05 RX ADMIN — ATORVASTATIN CALCIUM 40 MG: 40 TABLET, FILM COATED ORAL at 20:56

## 2024-04-05 RX ADMIN — INSULIN LISPRO 8 UNITS: 100 INJECTION, SOLUTION INTRAVENOUS; SUBCUTANEOUS at 12:36

## 2024-04-05 RX ADMIN — Medication 5 MG: at 20:56

## 2024-04-05 RX ADMIN — INSULIN GLARGINE 8.5 UNITS: 100 INJECTION, SOLUTION SUBCUTANEOUS at 08:59

## 2024-04-05 RX ADMIN — INSULIN LISPRO 4 UNITS: 100 INJECTION, SOLUTION INTRAVENOUS; SUBCUTANEOUS at 17:25

## 2024-04-05 RX ADMIN — Medication 2000 UNITS: at 09:17

## 2024-04-05 RX ADMIN — ENOXAPARIN SODIUM 40 MG: 100 INJECTION SUBCUTANEOUS at 20:56

## 2024-04-05 RX ADMIN — INSULIN GLARGINE 8 UNITS: 100 INJECTION, SOLUTION SUBCUTANEOUS at 20:59

## 2024-04-05 RX ADMIN — TAMSULOSIN HYDROCHLORIDE 0.4 MG: 0.4 CAPSULE ORAL at 20:56

## 2024-04-05 RX ADMIN — CYANOCOBALAMIN TAB 1000 MCG 1000 MCG: 1000 TAB at 09:16

## 2024-04-05 RX ADMIN — AMLODIPINE BESYLATE 5 MG: 5 TABLET ORAL at 09:16

## 2024-04-05 RX ADMIN — LISINOPRIL 40 MG: 20 TABLET ORAL at 09:17

## 2024-04-05 RX ADMIN — ARIPIPRAZOLE 2 MG: 2 TABLET ORAL at 09:17

## 2024-04-05 RX ADMIN — INSULIN LISPRO 6 UNITS: 100 INJECTION, SOLUTION INTRAVENOUS; SUBCUTANEOUS at 08:58

## 2024-04-05 ASSESSMENT — COGNITIVE AND FUNCTIONAL STATUS - GENERAL
WALKING IN HOSPITAL ROOM: A LITTLE
CLIMB 3 TO 5 STEPS WITH RAILING: A LITTLE
CLIMB 3 TO 5 STEPS WITH RAILING: A LITTLE
MOVING TO AND FROM BED TO CHAIR: A LITTLE
DAILY ACTIVITIY SCORE: 21
STANDING UP FROM CHAIR USING ARMS: A LITTLE
TOILETING: A LITTLE
MOVING TO AND FROM BED TO CHAIR: A LITTLE
PERSONAL GROOMING: A LITTLE
MOBILITY SCORE: 20
DAILY ACTIVITIY SCORE: 22
MOBILITY SCORE: 20
WALKING IN HOSPITAL ROOM: A LITTLE
HELP NEEDED FOR BATHING: A LITTLE
TOILETING: A LITTLE
HELP NEEDED FOR BATHING: A LITTLE
CLIMB 3 TO 5 STEPS WITH RAILING: A LITTLE
MOVING TO AND FROM BED TO CHAIR: A LITTLE
WALKING IN HOSPITAL ROOM: A LITTLE
STANDING UP FROM CHAIR USING ARMS: A LITTLE
MOBILITY SCORE: 20
STANDING UP FROM CHAIR USING ARMS: A LITTLE

## 2024-04-05 ASSESSMENT — PAIN - FUNCTIONAL ASSESSMENT
PAIN_FUNCTIONAL_ASSESSMENT: 0-10

## 2024-04-05 ASSESSMENT — PAIN SCALES - GENERAL
PAINLEVEL_OUTOF10: 7
PAINLEVEL_OUTOF10: 7
PAINLEVEL_OUTOF10: 0 - NO PAIN

## 2024-04-05 NOTE — PROGRESS NOTES
Pharmacy Medication History Review    Homero Stone is a 83 y.o. male admitted for Altered mental status. Pharmacy reviewed the patient's mwrob-an-taeljgjyx medications and allergies for accuracy.    The list below reflects the updated PTA list. Comments regarding how patient may be taking medications differently can be found in the Admit Orders Activity  Prior to Admission Medications   Prescriptions Last Dose Informant   ARIPiprazole (Abilify) 2 mg tablet Unknown Other   Sig: Take 1 tablet (2 mg) by mouth once daily.   amLODIPine (Norvasc) 5 mg tablet Unknown Other   Sig: Take 1 tablet (5 mg) by mouth once daily.   atorvastatin (Lipitor) 40 mg tablet Unknown Other   Sig: Take 1 tablet (40 mg) by mouth once daily at bedtime.   lisinopril 40 mg tablet Unknown Other   Sig: Take 1 tablet (40 mg) by mouth once daily.   metFORMIN (Glucophage) 1,000 mg tablet Unknown Other   Sig: Take 1 tablet (1,000 mg) by mouth 2 times a day with meals.   tamsulosin (Flomax) 0.4 mg 24 hr capsule Unknown Other   Sig: Take 1 capsule (0.4 mg) by mouth once daily at bedtime.      Facility-Administered Medications: None       The list below reflects the updated allergy list. Please review each documented allergy for additional clarification and justification.  Allergies  Reviewed by Hien Anderson RN on 4/4/2024        Severity Reactions Comments    Penicillins High Hallucinations, Shortness of breath, Swelling, Unknown     Lidocaine Not Specified Nausea/vomiting, Unknown hallucinations    Naproxen Not Specified Dizziness     Nsaids (non-steroidal Anti-inflammatory Drug) Not Specified Dizziness     Quinolones Not Specified Other             Patient was unable to be assessed for M2B at discharge. Pharmacy has been updated to [facility]. Patient may benefit from M2B service, please reassess closer to discharge.     Sources used to complete the med history include out patient fill history, OARRS, and patient interview   Poor  historian, seemed confused at time of interview- reported he fills through [facility]  Mentioned medication for diabetes during interview, drug name unknown  Attempted to reach listed patient contacts- voicemail left     Below are additional concerns with the patient's PTA list.  Unable to obtain clear picture of patient's home medication regimen or compliance  Per pharmacist at [facility] patient has remote fill history  Amlodipine last filled May 2023  Aripiprazole last filled June 2023  Atorvastatin last filled June 2023  Lisinopril last filled June 2023  Metformin last filled May 2023  Tamsulosin last filled June 2023    Marcel Gallegos, PharmD  Transitions of Care Pharmacist  [facility]  Please reach out via Secure Chat for questions, or if no response call [email or web address] or [facility]

## 2024-04-05 NOTE — NURSING NOTE
1944 Messaged Primary: Pt ordered 8.5 units of lantus tonight, can we please have this changed to 8 units when you're able? Pharmacy noted there should not be any any half units. Thank you.

## 2024-04-05 NOTE — PROGRESS NOTES
Physical Therapy    Physical Therapy Evaluation    Patient Name: Homero Stone  MRN: [id number]  Today's Date: 4/5/2024   Time Calculation  Start Time: 1140  Stop Time: 1156  Time Calculation (min): 16 min    Assessment/Plan   PT Assessment  PT Assessment Results: Impaired balance, Impaired judgement, Decreased safety awareness  Rehab Prognosis: Good  Barriers to Discharge: none  Evaluation/Treatment Tolerance: Patient tolerated treatment well  End of Session Communication: Bedside nurse  Assessment Comment: 83 y.o. M admitted for AMS now demonstrating impaired function. Will benefit from continued PT while in house to further assess and progress function/safety as appropriate.  End of Session Patient Position: Bed, 3 rail up, Alarm off, not on at start of session  IP OR SWING BED PT PLAN  Inpatient or Swing Bed: Inpatient  PT Plan  Treatment/Interventions: Gait training, Balance training, Strengthening, Therapeutic activity  PT Plan: PT Eval only  PT Frequency: 3 times per week  PT Discharge Recommendations: Moderate intensity level of continued care (vs 24 hr assist at home. Pt with questionable safety at home living alone. Pt could benefit from short/trial MOD intensity stay to ensure eventual safe return home alone where patient formerly lives alone independently.)  PT Recommended Transfer Status: Contact guard  PT - OK to Discharge: Yes      Subjective   General Visit Information:  General  Reason for Referral: AMS/inability to care for self  Past Medical History Relevant to Rehab: 83 y.o. male presenting with a PMH of HLD, BPPV, pituitary adenoma (diagnosed 2012), T2DM, and HTN. Now p/w concern from family for further evaluation of AMS.  Family/Caregiver Present: Yes (Great Niece, John)  Caregiver Feedback: John is very pleasant and assisting in subjective as needed.  Patient Position Received: Bed, 3 rail up, Alarm off, not on at start of session  Preferred Learning Style: verbal  General Comment: Pt  "resting in bed upon entry. Pleasantly confused. Pt answering questions appropriately though would make tangential comments, ie Confucianism Christian and \"we all reincarntate as cows.\"  Home Living:  Home Living  Type of Home: House  Lives With: Alone  Home Adaptive Equipment: None  Home Layout: One level  Home Access: Stairs to enter with rails  Entrance Stairs-Number of Steps: 5  Prior Level of Function:  Prior Function Per Pt/Caregiver Report  Level of [place]: Independent with ADLs and functional transfers  Receives Help From:  ([place] involved though patient not typically requiring assist.)  ADL Assistance:  (Niece reports patient has not been performing his own self care/ADL's/hygiene.)  Homemaking Assistance: Independent  Ambulatory Assistance: Independent  Precautions:  Precautions  Medical Precautions: Fall precautions (Niece reports no falls, however reports that patient's neighbors told family they have seen patient stumble multiple times while ambulating outside-near falls.)    Objective   Pain:  Pain Assessment  Pain Assessment: 0-10  Pain Score:  (No complaints of pain during visit.)  Cognition:  Cognition  Overall Cognitive Status:  (Awake, alert, pleasant and cooperative. Generalized confusion though otherwise receptive to therapist's questions and instruction.)  Orientation Level: Disoriented to time, Disoriented to situation  Insight: Mild  Impulsive: Mildly    General Assessments:    Activity Tolerance  Endurance: Endurance does not limit participation in activity    Sensation  Light Touch: No apparent deficits    Strength  Strength Comments: Grossly >4+/5 throughout BLE  Strength  Strength Comments: Grossly >4+/5 throughout BLE    Perception  Inattention/Neglect: Appears intact      Coordination  Movements are Fluid and Coordinated: Yes    Postural Control  Postural Control: Within Functional Limits    Static Sitting Balance  Static Sitting-Balance Support: Feet supported  Static Sitting-Level of " Assistance: Independent    Static Standing Balance  Static Standing-Level of Assistance: Contact guard  Functional Assessments:    Bed Mobility  Bed Mobility: No (Pt sitting EOB pre/post visit. No anticipated difficulty.)    Transfers  Transfer: Yes  Transfer 1  Transfer From 1: Sit to, Stand to  Transfer to 1: Stand, Sit  Transfer Device 1:  (None)  Transfer Level of Assistance 1: Contact guard    Ambulation/Gait Training  Ambulation/Gait Training Performed: Yes  Ambulation/Gait Training 1  Surface 1: Level tile  Device 1: No device  Assistance 1: Arm in arm assistance, Minimum assistance  Quality of Gait 1:  (Slight LOB with turning. Rigid posture with straight line gait. No complaints of lightheadedness or dizziness. Slight falls risk without assist.)  Comments/Distance (ft) 1: 75ft    Outcome Measures:  [facility] Basic Mobility  Turning from your back to your side while in a flat bed without using bedrails: None  Moving from lying on your back to sitting on the side of a flat bed without using bedrails: None  Moving to and from bed to chair (including a wheelchair): A little  Standing up from a chair using your arms (e.g. wheelchair or bedside chair): A little  To walk in hospital room: A little  Climbing 3-5 steps with railing: A little  Basic Mobility - Total Score: 20    Encounter Problems       Encounter Problems (Active)       Mobility       STG - Patient will ambulate >200ft, least restrictive device, stand by assist       Start:  04/05/24    Expected End:  04/19/24               PT Transfers       STG - Patient to transfer to and from sit to supine independent       Start:  04/05/24    Expected End:  04/19/24            STG - Patient will transfer sit to and from stand independent       Start:  04/05/24    Expected End:  04/19/24               Pain - Adult              Education Documentation  Precautions, taught by Romero Brantley, PT at 4/5/2024 12:46 PM.  Learner: Patient  Readiness: Acceptance  Method:  Explanation  Response: Verbalizes Understanding    Mobility Training, taught by Romero Brantley PT at 4/5/2024 12:46 PM.  Learner: Patient  Readiness: Acceptance  Method: Explanation  Response: Verbalizes Understanding    Education Comments  No comments found.

## 2024-04-05 NOTE — PROGRESS NOTES
Hospitalist Progress Note        Name: Homero Stone  :  1940(83 y.o.)  MRN:  [id number]    Date: 24     SUBJECTIVE     HPI:  This is a 83 y.o. male with past medical history of HLD, BPPV, pituitary adenoma (diagnosed ), T2DM, and HTN who presented to the emergency department after wellness check by police/EMS due to family concern for his wellbeing. Admitted for AMS, inability to care for self.    Interval History:   Vitals and chart notes from overnight reviewed. No acute issues overnight.   Patient seen and evaluated at bedside. Very strong malodorous smell in the room; family member present at bedside (Isabel, grand-niece) states this is one of the issues with his living situation, that he has cats that are urinating all over the house and the smell of ammonia and cat urine makes it almost impossible to breathe in the home. Isabel believes this is contributing to the patient's AMS. Per Isabel, this has been ongoing for several months, both the poor hygiene and the poor mentation. She states the patient's godson, Sanjay, is his POA, but it is difficult to reach him because he works all day. She states he is aware that the patient is currently admitted to the hospital.   The patient tells me we are in the hospital but thinks the year is 2019.    Review of Systems:   Unable to accurately obtain due to AMS - he denies any pain.    Current medications:  Scheduled Meds:amLODIPine, 5 mg, oral, Daily  ARIPiprazole, 2 mg, oral, Daily  atorvastatin, 40 mg, oral, Nightly  cholecalciferol, 2,000 Units, oral, Daily  cyanocobalamin, 1,000 mcg, oral, Daily  enoxaparin, 40 mg, subcutaneous, q24h  insulin glargine, 0.1 Units/kg, subcutaneous, Nightly  insulin lispro, 0-10 Units, subcutaneous, TID with meals  lisinopril, 40 mg, oral, Daily  tamsulosin, 0.4 mg, oral, Nightly      Continuous Infusions:   PRN Meds:PRN medications: acetaminophen, dextrose, dextrose, glucagon, glucagon, melatonin, polyethylene  glycol      OBJECTIVE     Vitals:    04/04/24 2222 04/05/24 0337 04/05/24 0900 04/05/24 1314   BP: 151/79 119/74 129/67 108/54   BP Location:       Patient Position:       Pulse: 67 90 85 74   Resp: 17 16 17 17   Temp: 36.5 °C (97.7 °F) 36.4 °C (97.5 °F) 36.9 °C (98.4 °F) 36.8 °C (98.2 °F)   TempSrc:       SpO2: 98% 98% 99% 99%   Weight:       Height:          Physical Exam  Vitals and nursing note reviewed.   Constitutional:       General: He is not in acute distress.     Appearance: He is not ill-appearing or toxic-appearing.      Comments: Disheveled and foul smelling clothes   HENT:      Head: Normocephalic and atraumatic.      Nose: Nose normal.      Mouth/Throat:      Mouth: Mucous membranes are moist.   Eyes:      Extraocular Movements: Extraocular movements intact.   Pulmonary:      Effort: Pulmonary effort is normal. No respiratory distress.   Abdominal:      General: There is no distension.   Musculoskeletal:         General: No swelling.      Cervical back: No rigidity.   Skin:     Coloration: Skin is not pale.   Neurological:      Mental Status: He is alert. He is disoriented.   Psychiatric:         Mood and Affect: Mood normal.         Cognition and Memory: Cognition is impaired. Memory is impaired.         Labs:   Lab Results   Component Value Date     04/05/2024    K 3.8 04/05/2024     04/05/2024    CO2 24 04/05/2024    BUN 8 04/05/2024    CREATININE 0.77 04/05/2024    GLUCOSE 400 (H) 04/05/2024    CALCIUM 8.9 04/05/2024    PROT 6.3 (L) 04/04/2024    BILITOT 0.3 04/04/2024    ALKPHOS 164 (H) 04/04/2024    AST 15 04/04/2024    ALT 16 04/04/2024       Lab Results   Component Value Date    WBC 7.7 04/05/2024    HGB 11.7 (L) 04/05/2024    HCT 34.1 (L) 04/05/2024    MCV 78 (L) 04/05/2024     04/05/2024       Imaging:   MR brain w and wo IV contrast    Result Date: 4/5/2024  Interpreted By:  Gail Lorenzo  and Oziel Eaton STUDY: MR BRAIN W AND WO IV CONTRAST;  4/4/2024 7:11 pm    INDICATION: Signs/Symptoms:hx of pituitary adenoma with AMS.   COMPARISON: CT head dated 10/06/2023 MRI brain dated 05/03/2022   ACCESSION NUMBER(S): [id number]   ORDERING CLINICIAN: POP HUSTON   TECHNIQUE: Axial T2, FLAIR, DWI, gradient echo T2 and sagittal and coronal T1 weighted images of brain were acquired. Post contrast T1 weighted images were acquired after administration of 16 mL Dotarem gadolinium based intravenous contrast.   High resolution coronal and sagittal T1 and T2  weighted MR images of the sella were acquired. High resolution post contrast axial and sagittal T1 weighted MR images of the sella were obtained. Dynamic post contrast imaging was also performed.   FINDINGS: Compared to the prior MRI brain dated 05/23/2022 there is similar appearance of the enhancing sellar mass with T2/FLAIR hyperintense signal of contrast enhancement, measuring approximately 3.0 x 2.5 x 2.3 cm (series 21, image 50 and series 19, image 69). There is similar appearance of the more focal lobulated portion superiorly which abuts and possibly partially encases the infundibulum with rightward displacement (series 16, image 16). The lesion again is noted extending from the sella into the suprasellar cistern with superior displacement of the optic chiasm (series 16, image 14), similar to prior imaging. There is extension of the mass into the left greater than right sphenoid sinuses similar to the prior, abutting the floor of the sphenoid sinus and scalloping the superior margin of the clivus. This appears slightly progressed in size since prior imaging measuring up to 2.7 cm (AP oblique), previously 2.3 cm. There is similar greater than 180 degree encasement of the left cavernous sinus (series 16, image 13) with abutment of the cavernous segment of the intracranial internal carotid artery which remains grossly patent.   No diffusion restriction abnormality to suggest acute infarction. Mild scattered nonspecific T2 and  FLAIR hyperintensities in the subcortical and periventricular white matter which are nonspecific however can be seen with chronic small vessel ischemic change. No acute intracranial hemorrhage. No new foci of abnormal enhancement.   There is moderate prominence of the ventricles and sulci diffusely likely reflecting component of generalized parenchymal volume loss. Similar to the prior MRI. No abnormal extra-axial fluid collections.   Extension of the sellar mass into the sphenoid sinuses as described above. The visualized paranasal sinuses and bilateral mastoid air cells are otherwise unremarkable.       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 There is slight interval progression of the exophytic portion which extends into the sphenoid sinus as described otherwise similar appearance of mass effect on the optic chiasm and pituitary infundibulum. Similar involvement of the right cavernous sinus extending along the cavernous ICA without significant stenosis.   Moderate generalized parenchymal volume loss and mild chronic small vessel ischemic changes.   I personally reviewed the images/study and I agree with the findings as stated by resident physician Dr. José Miguel Ludwig.   MACRO: None   Signed by: Gail Lorenzo 4/5/2024 12:14 AM Dictation workstation:   [id number]    XR chest 1 view    Result Date: 4/4/2024  STUDY: Chest Radiograph;  4/4/2024 3:44 PM INDICATION: Altered mental status. COMPARISON: 10/06/2023 XR Chest 1 View. ACCESSION NUMBER(S): [id number] ORDERING CLINICIAN: POP HUSTON TECHNIQUE:  Frontal chest was obtained at 16:44 hours. FINDINGS: CARDIOMEDIASTINAL SILHOUETTE: Cardiomediastinal silhouette is normal in size and configuration.  LUNGS: Lungs are clear.  ABDOMEN: No remarkable upper abdominal findings.  BONES: No acute osseous changes.    No acute process. Signed by Yfn Freedman MD        ASSESSMENT & PLAN     #AMS  #hx of pituitary adenoma  (diagnosed 2012)  :: could be d/t progressive undiagnosed dementia  :: MRI of brain unchanged from prior  - falls precautions  - PT/OT consulted  - per neurosurgery consult note dated 5/4/22: pituitary adenoma diagnosed by Dr. Bueno; pt declined surgical intervention at that time and was lost to follow up  -Called POA but unable to reach -- niece at bedside, Isabel, updated     #T2DM (uncontrolled)  - HgA1c 12.3  - accucheck ACHS  - start lantus 8.5 units; mild SSI with meals  - hypoglycemia order set placed  - carb controlled vegetarian diet     #HTN  - continue home dose of Lisinopril 40 mg PO every day and Norvasc 5 mg PO every day     #HLD  - continue home dose of Lipitor 40 mg PO at bedtime      DVT Prophylaxis: lovenox 40 q 24hr - Estimated Creatinine Clearance: 76.1 mL/min (by C-G formula based on SCr of 0.77 mg/dL).    Code status: Full Code  Diet: Adult diet Carb Controlled; 60 gram carb/meal, 30 gram Carb evening snack; Vegetarian     Disposition: continue to monitor inpatient and await clinical improvement    Level of MDM:  High   Risk: High   Data Reviewed and/or Analyzed:   Included: Prior external notes from at least 1 unique source, Results, including laboratory findings and imaging reports, listed above, Orders and notes from all consultants involved, and Notes for this encounter.  I personally reviewed the tests referenced above.  I discussed plan of care with patient, niece, TCC/SW, nurse.    The patient/family had opportunity to ask questions. All questions were answered to the best of my ability.    Between 7AM-7PM please message me via Epic Secure Chat.  After 7PM please page [facility] on call.    Electronically signed by Alisia Barriga DO on 04/05/24 at 6:53 PM

## 2024-04-05 NOTE — PROGRESS NOTES
Transitional Care Coordination Progress Note:  Patient discussed during interdisciplinary rounds.   Team members present: MD, TCC  Plan per Medical/Surgical team: altered mental status   Payor: [employer]  Discharge disposition: pt/ot pending  Potential Barriers: none  ADOD: 1-2 days  Attempted to reach POA/alfa Bowles to complete admission assessment due to patient confusion. Message left for return call. Will continue to monitor for discharge planning needs.   Update 1445: This TCC placed second call to Sanjay Bowles to complete admission assessment and discuss PT recommendations for moderate intensity therapy. No answer, second message left.     Charlee RODRIGUEZN, RN  Transitional Care Coordinator (TCC)  [phone]

## 2024-04-05 NOTE — H&P
History Of Present Illness  Homero Stone is a 83 y.o. male presenting with a PMH of HLD, BPPV, pituitary adenoma (diagnosed ), T2DM, and HTN. Mr. Stone presented to ED via EMS due to concern of his family for further evaluation of AMS. Family of pt report that they are concerned at his mental status and state that pt is not taking care of himself or bathing. Pt lives alone. Pt resting comfortably when seen by writer in the ED. Pt alert and oriented to self but knows ,age, and that he is a vegetarian. Pt thought the yr was  and that the president was Dionte Bowles. When asked why he came to hospital pt stated that he guesses he came here to get check out. Pt able to follow commands. Labs obtained on admit notable for hyperglycemia. MRI of brain done while in ED in order to check whether pituitary adenoma had increased in size and could be contributing to AMS of pt. MRI imaging showed no significant change in appearance of the pituitary macroadenoma with stable degree of mass effect on the optic chiasm, rightward deviation/encasement of the pituitary infundibulum, and partial encasement of the cavernous segment of the left intracranial internal carotid artery. Pt has HCPOA (Sanjay Bowles; [phone]) but he did not answer phone when contacted by ED provider. Collateral info obtained from pt's niece John Bowles ([phone]). Mr. Stone will benefit from PT/OT evaluation and SNF placement. Upon chart review, pt was last seen in ED 10/6/23 due to concern for confusion after he was found wondering the streets but pt was discharged home after negative workup. Questionable compliance with home medications per dispense report meds have been filled sporadically in . No documentation in chart from PCP/other providers to indicate medications were discontinued. Pt admitted to medicine service under observation for AMS and PT/OT eval for SNF placement.    Past Medical History  Past  Medical History:   Diagnosis Date    Diabetes mellitus (CMS/HCC)     Hypertension        Surgical History  Past Surgical History:   Procedure Laterality Date    CT ANGIO NECK W  3/2/2017    CT NECK ANGIO W AND WO IV CONTRAST [place]2/2017 [facility]    CT HEAD ANGIO W AND WO IV CONTRAST  3/2/2017    CT HEAD ANGIO W AND WO IV CONTRAST 3/2/2017 [facility]        Social History  He reports that he has quit smoking. His smoking use included cigarettes. He has never used smokeless tobacco. No history on file for alcohol use and drug use.    Family History  No family history on file.     Allergies  Penicillins, Lidocaine, Naproxen, Nsaids (non-steroidal anti-inflammatory drug), and Quinolones    Review of Systems   Reason unable to perform ROS: AMS.        Physical Exam  Vitals reviewed.   Constitutional:       General: He is not in acute distress.     Appearance: He is not ill-appearing.      Comments: malodorous   HENT:      Head: Normocephalic and atraumatic.      Nose: Nose normal.      Mouth/Throat:      Mouth: Mucous membranes are dry.   Eyes:      Extraocular Movements: Extraocular movements intact.      Conjunctiva/sclera: Conjunctivae normal.   Cardiovascular:      Rate and Rhythm: Normal rate and regular rhythm.      Pulses: Normal pulses.      Heart sounds: Normal heart sounds.   Pulmonary:      Effort: Pulmonary effort is normal. No respiratory distress.      Breath sounds: Normal breath sounds.   Abdominal:      General: Bowel sounds are normal.      Palpations: Abdomen is soft.      Tenderness: There is no abdominal tenderness.   Musculoskeletal:         General: Normal range of motion.      Cervical back: Normal range of motion and neck supple.      Right lower leg: Edema present.      Left lower leg: Edema present.   Neurological:      Mental Status: He is alert.      Comments: Oriented to self, disoriented to time, place, and situation  Able to follow commands   Psychiatric:         Mood  "and Affect: Mood normal.          Last Recorded Vitals  Blood pressure 151/79, pulse 67, temperature 36.5 °C (97.7 °F), resp. rate 17, height 1.702 m (5' 7.01\"), weight 82.6 kg (182 lb 1.6 oz), SpO2 98 %.    Relevant Results  MR brain w and wo IV contrast    Result Date: 4/4/2024  STUDY: MR BRAIN W AND WO IV CONTRAST;  4/4/2024 7:11 pm   INDICATION: Signs/Symptoms:hx of pituitary adenoma with AMS.   COMPARISON: CT head dated 10/06/2023 MRI brain dated 05/03/2022   ACCESSION NUMBER(S): [id number]   ORDERING CLINICIAN: POP HUSTON   TECHNIQUE: Axial T2, FLAIR, DWI, gradient echo T2 and sagittal and coronal T1 weighted images of brain were acquired. Post contrast T1 weighted images were acquired after administration of 16 mL Dotarem gadolinium based intravenous contrast.   High resolution coronal and sagittal T1 and T2  weighted MR images of the sella were acquired. High resolution post contrast axial and sagittal T1 weighted MR images of the sella were obtained. Dynamic post contrast imaging was also performed.   FINDINGS: Compared to the prior MRI brain dated 05/23/2022 there is no significant interval change in size of the enhancing sellar mass measuring approximately 3.0 x 2.5 x 2.3 cm (series 21, image 50 and series 19, image 69). There is similar appearance of the more focal lobulated portion superiorly which abuts the optic chiasm and appears to encase or at least rightwardly displace the infundibulum (series 16, image 16). There is extension of the mass into the sphenoid sinuses similar to the prior, abutting the floor of the sphenoid sinus and scalloping the superior margin of the clivus. There is similar greater than 180 degree encasement of the left cavernous sinus with abutment of the cavernous portion of the intracranial internal carotid artery which remains patent.   No diffusion restriction abnormality to suggest acute infarction. Mild scattered nonspecific T2 and FLAIR hyperintensities in the " subcortical and periventricular white matter which are nonspecific however can be seen with chronic small vessel ischemic change. No acute intracranial hemorrhage. No new foci of abnormal enhancement.   There is moderate prominence of the ventricles and sulci diffusely likely reflecting component of generalized parenchymal volume loss. Similar to the prior MRI. No abnormal extra-axial fluid collections.   Extension of the sellar mass into the sphenoid sinuses as described above. The visualized paranasal sinuses and bilateral mastoid air cells are otherwise unremarkable.       1. No evidence of acute intracranial pathology. 2. No significant change in appearance of the pituitary macroadenoma with stable degree of mass effect on the optic chiasm, rightward deviation/encasement of the pituitary infundibulum, and partial encasement of the cavernous segment of the left intracranial internal carotid artery. 3. Moderate generalized parenchymal volume loss and mild chronic small vessel ischemic changes.   I personally reviewed the images/study and I agree with the findings as stated by resident physician Dr. José Miguel Ludwig.   MACRO: None     Dictation workstation:   [id number]    XR chest 1 view    Result Date: 4/4/2024  STUDY: Chest Radiograph;  4/4/2024 3:44 PM INDICATION: Altered mental status. COMPARISON: 10/06/2023 XR Chest 1 View. ACCESSION NUMBER(S): [id number] ORDERING CLINICIAN: POP HUSTON TECHNIQUE:  Frontal chest was obtained at 16:44 hours. FINDINGS: CARDIOMEDIASTINAL SILHOUETTE: Cardiomediastinal silhouette is normal in size and configuration.  LUNGS: Lungs are clear.  ABDOMEN: No remarkable upper abdominal findings.  BONES: No acute osseous changes.    No acute process. Signed by Yfn Freedman MD      Results for orders placed or performed during the hospital encounter of 04/04/24 (from the past 24 hour(s))   Influenza A, and B PCR   Result Value Ref Range    Flu A Result Not Detected Not Detected     Flu B Result Not Detected Not Detected   Sars-CoV-2 PCR   Result Value Ref Range    Coronavirus 2019, PCR Not Detected Not Detected   Urinalysis with Reflex Culture and Microscopic   Result Value Ref Range    Color, Urine Light-Yellow Light-Yellow, Yellow, Dark-Yellow    Appearance, Urine Clear Clear    Specific Gravity, Urine 1.025 1.005 - 1.035    pH, Urine 7.0 5.0, 5.5, 6.0, 6.5, 7.0, 7.5, 8.0    Protein, Urine NEGATIVE NEGATIVE, 10 (TRACE), 20 (TRACE) mg/dL    Glucose, Urine OVER (4+) (A) Normal mg/dL    Blood, Urine NEGATIVE NEGATIVE    Ketones, Urine NEGATIVE NEGATIVE mg/dL    Bilirubin, Urine NEGATIVE NEGATIVE    Urobilinogen, Urine Normal Normal mg/dL    Nitrite, Urine NEGATIVE NEGATIVE    Leukocyte Esterase, Urine 75 Anita/µL (A) NEGATIVE   Microscopic Only, Urine   Result Value Ref Range    WBC, Urine 21-50 (A) 1-5, NONE /HPF    RBC, Urine 3-5 NONE, 1-2, 3-5 /HPF    Squamous Epithelial Cells, Urine 1-9 (SPARSE) Reference range not established. /HPF   Drug Screen, Urine   Result Value Ref Range    Amphetamine Screen, Urine Presumptive Negative Presumptive Negative    Barbiturate Screen, Urine Presumptive Negative Presumptive Negative    Benzodiazepines Screen, Urine Presumptive Negative Presumptive Negative    Cannabinoid Screen, Urine Presumptive Negative Presumptive Negative    Cocaine Metabolite Screen, Urine Presumptive Negative Presumptive Negative    Fentanyl Screen, Urine Presumptive Negative Presumptive Negative    Opiate Screen, Urine Presumptive Negative Presumptive Negative    Oxycodone Screen, Urine Presumptive Negative Presumptive Negative    PCP Screen, Urine Presumptive Negative Presumptive Negative    Methadone Screen, Urine Presumptive Negative Presumptive Negative   CBC and Auto Differential   Result Value Ref Range    WBC 9.0 4.4 - 11.3 x10*3/uL    nRBC 0.0 0.0 - 0.0 /100 WBCs    RBC 4.80 4.50 - 5.90 x10*6/uL    Hemoglobin 12.7 (L) 13.5 - 17.5 g/dL    Hematocrit 36.5 (L) 41.0 - 52.0 %     MCV 76 (L) 80 - 100 fL    MCH 26.5 26.0 - 34.0 pg    MCHC 34.8 32.0 - 36.0 g/dL    RDW 12.7 11.5 - 14.5 %    Platelets 257 150 - 450 x10*3/uL    Neutrophils % 50.7 40.0 - 80.0 %    Immature Granulocytes %, Automated 0.4 0.0 - 0.9 %    Lymphocytes % 38.5 13.0 - 44.0 %    Monocytes % 7.5 2.0 - 10.0 %    Eosinophils % 2.2 0.0 - 6.0 %    Basophils % 0.7 0.0 - 2.0 %    Neutrophils Absolute 4.56 1.60 - 5.50 x10*3/uL    Immature Granulocytes Absolute, Automated 0.04 0.00 - 0.50 x10*3/uL    Lymphocytes Absolute 3.46 (H) 0.80 - 3.00 x10*3/uL    Monocytes Absolute 0.67 0.05 - 0.80 x10*3/uL    Eosinophils Absolute 0.20 0.00 - 0.40 x10*3/uL    Basophils Absolute 0.06 0.00 - 0.10 x10*3/uL   Comprehensive metabolic panel   Result Value Ref Range    Glucose 381 (H) 74 - 99 mg/dL    Sodium 137 136 - 145 mmol/L    Potassium 4.4 3.5 - 5.3 mmol/L    Chloride 102 98 - 107 mmol/L    Bicarbonate 27 21 - 32 mmol/L    Anion Gap 12 10 - 20 mmol/L    Urea Nitrogen 8 6 - 23 mg/dL    Creatinine 0.88 0.50 - 1.30 mg/dL    eGFR 86 >60 mL/min/1.73m*2    Calcium 9.0 8.6 - 10.6 mg/dL    Albumin 4.1 3.4 - 5.0 g/dL    Alkaline Phosphatase 164 (H) 33 - 136 U/L    Total Protein 6.3 (L) 6.4 - 8.2 g/dL    AST 15 9 - 39 U/L    Bilirubin, Total 0.3 0.0 - 1.2 mg/dL    ALT 16 10 - 52 U/L   Magnesium   Result Value Ref Range    Magnesium 1.92 1.60 - 2.40 mg/dL   Phosphorus   Result Value Ref Range    Phosphorus 3.4 2.5 - 4.9 mg/dL   Protime-INR   Result Value Ref Range    Protime 10.7 9.8 - 12.8 seconds    INR 1.0 0.9 - 1.1   Acute Toxicology Panel, Blood   Result Value Ref Range    Acetaminophen <10.0 10.0 - 30.0 ug/mL    Salicylate  <3 4 - 20 mg/dL    Alcohol <10 <=10 mg/dL   Hemoglobin A1c   Result Value Ref Range    Hemoglobin A1C 12.3 (H) see below %    Estimated Average Glucose 306 Not Established mg/dL   POCT GLUCOSE   Result Value Ref Range    POCT Glucose 295 (H) 74 - 99 mg/dL      Lab Results   Component Value Date    HGBA1C 12.3 (H) 04/04/2024       ED Medication Administration from 04/04/2024 1531 to 04/04/2024 2212         Date/Time Order Dose Route Action Action by     04/04/2024 1854 EDT gadoterate meglumine (Dotarem) 0.5 mmol/mL contrast injection 20 mL 16 mL intravenous Given Bartosic, R           Scheduled medications  [START ON 4/5/2024] amLODIPine, 5 mg, oral, Daily  [START ON 4/5/2024] ARIPiprazole, 2 mg, oral, Daily  atorvastatin, 40 mg, oral, Nightly  enoxaparin, 40 mg, subcutaneous, q24h  [START ON 4/5/2024] insulin lispro, 0-10 Units, subcutaneous, TID with meals  [START ON 4/5/2024] lisinopril, 40 mg, oral, Daily  tamsulosin, 0.4 mg, oral, Nightly      Continuous medications     PRN medications  PRN medications: acetaminophen, dextrose, dextrose, glucagon, glucagon, melatonin, polyethylene glycol       Assessment/Plan   Principal Problem:    Altered mental status    #AMS  #hx of pituitary adenoma (diagnosed 2012)  :: could be d/t undiagnosed dementia  :: MRI of brain: No evidence of acute intracranial pathology. 2. No significant change in appearance of the pituitary macroadenoma with stable degree of mass effect on the optic chiasm, rightward deviation/encasement of the pituitary infundibulum, and partial encasement of the cavernous segment of the left intracranial internal carotid artery. 3. Moderate generalized parenchymal volume loss and mild chronic small vessel ischemic changes.     - Labs ordered as additional workup for AMS: HIV, Syphilis, CRP, ESR, Vit B12, Vit D, UA/UDS (neg), tox panel (neg), TSH, ammonia level  - falls and aspiration precautions  - swallow eval prior to PO intake at bedside  - PT/OT consulted  - per neurosurgery consult note dated 5/4/22: pituitary adenoma diagnosed by Dr. Bueno; pt declined surgical intervention at that time and was lost to follow up    #T2DM (uncontrolled)  - last HgA1c 12.3% 4/4/24  - accucheck ACHS  - mild SSI using Lispro while hospitalized  - hypoglycemia order set placed  - carb controlled  vegetarian diet    #HTN  - last /79  - continue to monitor BP  - continue home dose of Lisinopril 40 mg PO every day and Norvasc 5 mg PO every day    #HLD  - continue home dose of Lipitor 40 mg PO at bedtime    Dispo: admit to RNF. Pending PT/OT recs    I spent 60 minutes in the professional and overall care of this patient.      Samira Coronel, APRN-CNP

## 2024-04-05 NOTE — CARE PLAN
The patient's goals for the shift include pt. will be able to ambualte in room with physical therapy without dizziness or fall.    The clinical goals for the shift include pt. will have decreased pain to lower back by 4.6.24    Over the shift, the patient did not make progress toward the following goals. Barriers to progression include pt. Able to ambulate with staff and have shower. Recommendations to address these barriers include pt. Denies having any pain to back.

## 2024-04-06 LAB
ALBUMIN SERPL BCP-MCNC: 3.4 G/DL (ref 3.4–5)
ANION GAP SERPL CALC-SCNC: 13 MMOL/L (ref 10–20)
BASOPHILS # BLD AUTO: 0.05 X10*3/UL (ref 0–0.1)
BASOPHILS NFR BLD AUTO: 0.7 %
BUN SERPL-MCNC: 10 MG/DL (ref 6–23)
CALCIUM SERPL-MCNC: 8.5 MG/DL (ref 8.6–10.6)
CHLORIDE SERPL-SCNC: 107 MMOL/L (ref 98–107)
CO2 SERPL-SCNC: 22 MMOL/L (ref 21–32)
CREAT SERPL-MCNC: 0.76 MG/DL (ref 0.5–1.3)
EGFRCR SERPLBLD CKD-EPI 2021: 90 ML/MIN/1.73M*2
EOSINOPHIL # BLD AUTO: 0.15 X10*3/UL (ref 0–0.4)
EOSINOPHIL NFR BLD AUTO: 2.2 %
ERYTHROCYTE [DISTWIDTH] IN BLOOD BY AUTOMATED COUNT: 12.6 % (ref 11.5–14.5)
GLUCOSE BLD MANUAL STRIP-MCNC: 193 MG/DL (ref 74–99)
GLUCOSE BLD MANUAL STRIP-MCNC: 220 MG/DL (ref 74–99)
GLUCOSE BLD MANUAL STRIP-MCNC: 232 MG/DL (ref 74–99)
GLUCOSE BLD MANUAL STRIP-MCNC: 272 MG/DL (ref 74–99)
GLUCOSE BLD MANUAL STRIP-MCNC: 315 MG/DL (ref 74–99)
GLUCOSE SERPL-MCNC: 229 MG/DL (ref 74–99)
HCT VFR BLD AUTO: 33.5 % (ref 41–52)
HGB BLD-MCNC: 12.3 G/DL (ref 13.5–17.5)
IMM GRANULOCYTES # BLD AUTO: 0.03 X10*3/UL (ref 0–0.5)
IMM GRANULOCYTES NFR BLD AUTO: 0.4 % (ref 0–0.9)
LYMPHOCYTES # BLD AUTO: 3.27 X10*3/UL (ref 0.8–3)
LYMPHOCYTES NFR BLD AUTO: 47.8 %
MAGNESIUM SERPL-MCNC: 1.83 MG/DL (ref 1.6–2.4)
MCH RBC QN AUTO: 27.1 PG (ref 26–34)
MCHC RBC AUTO-ENTMCNC: 36.7 G/DL (ref 32–36)
MCV RBC AUTO: 74 FL (ref 80–100)
MONOCYTES # BLD AUTO: 0.56 X10*3/UL (ref 0.05–0.8)
MONOCYTES NFR BLD AUTO: 8.2 %
NEUTROPHILS # BLD AUTO: 2.78 X10*3/UL (ref 1.6–5.5)
NEUTROPHILS NFR BLD AUTO: 40.7 %
NRBC BLD-RTO: 0 /100 WBCS (ref 0–0)
PHOSPHATE SERPL-MCNC: 3.5 MG/DL (ref 2.5–4.9)
PLATELET # BLD AUTO: 229 X10*3/UL (ref 150–450)
POTASSIUM SERPL-SCNC: 3.7 MMOL/L (ref 3.5–5.3)
RBC # BLD AUTO: 4.54 X10*6/UL (ref 4.5–5.9)
SODIUM SERPL-SCNC: 138 MMOL/L (ref 136–145)
WBC # BLD AUTO: 6.8 X10*3/UL (ref 4.4–11.3)

## 2024-04-06 PROCEDURE — 2500000002 HC RX 250 W HCPCS SELF ADMINISTERED DRUGS (ALT 637 FOR MEDICARE OP, ALT 636 FOR OP/ED): Mod: MUE | Performed by: STUDENT IN AN ORGANIZED HEALTH CARE EDUCATION/TRAINING PROGRAM

## 2024-04-06 PROCEDURE — 2500000002 HC RX 250 W HCPCS SELF ADMINISTERED DRUGS (ALT 637 FOR MEDICARE OP, ALT 636 FOR OP/ED): Performed by: NURSE PRACTITIONER

## 2024-04-06 PROCEDURE — 85025 COMPLETE CBC W/AUTO DIFF WBC: CPT | Performed by: NURSE PRACTITIONER

## 2024-04-06 PROCEDURE — 2500000001 HC RX 250 WO HCPCS SELF ADMINISTERED DRUGS (ALT 637 FOR MEDICARE OP): Performed by: STUDENT IN AN ORGANIZED HEALTH CARE EDUCATION/TRAINING PROGRAM

## 2024-04-06 PROCEDURE — 99233 SBSQ HOSP IP/OBS HIGH 50: CPT | Performed by: STUDENT IN AN ORGANIZED HEALTH CARE EDUCATION/TRAINING PROGRAM

## 2024-04-06 PROCEDURE — 2500000004 HC RX 250 GENERAL PHARMACY W/ HCPCS (ALT 636 FOR OP/ED): Performed by: STUDENT IN AN ORGANIZED HEALTH CARE EDUCATION/TRAINING PROGRAM

## 2024-04-06 PROCEDURE — 82947 ASSAY GLUCOSE BLOOD QUANT: CPT | Mod: 59

## 2024-04-06 PROCEDURE — 96361 HYDRATE IV INFUSION ADD-ON: CPT | Performed by: NURSE PRACTITIONER

## 2024-04-06 PROCEDURE — 83735 ASSAY OF MAGNESIUM: CPT | Performed by: NURSE PRACTITIONER

## 2024-04-06 PROCEDURE — 2500000004 HC RX 250 GENERAL PHARMACY W/ HCPCS (ALT 636 FOR OP/ED): Mod: 59 | Performed by: NURSE PRACTITIONER

## 2024-04-06 PROCEDURE — 80069 RENAL FUNCTION PANEL: CPT | Performed by: NURSE PRACTITIONER

## 2024-04-06 PROCEDURE — 36415 COLL VENOUS BLD VENIPUNCTURE: CPT | Performed by: NURSE PRACTITIONER

## 2024-04-06 PROCEDURE — 96360 HYDRATION IV INFUSION INIT: CPT | Performed by: NURSE PRACTITIONER

## 2024-04-06 PROCEDURE — 2500000001 HC RX 250 WO HCPCS SELF ADMINISTERED DRUGS (ALT 637 FOR MEDICARE OP): Performed by: NURSE PRACTITIONER

## 2024-04-06 PROCEDURE — 96372 THER/PROPH/DIAG INJ SC/IM: CPT | Performed by: NURSE PRACTITIONER

## 2024-04-06 PROCEDURE — G0378 HOSPITAL OBSERVATION PER HR: HCPCS

## 2024-04-06 RX ORDER — INSULIN GLARGINE 100 [IU]/ML
10 INJECTION, SOLUTION SUBCUTANEOUS EVERY 24 HOURS
Status: DISCONTINUED | OUTPATIENT
Start: 2024-04-06 | End: 2024-04-07

## 2024-04-06 RX ORDER — IPRATROPIUM BROMIDE AND ALBUTEROL SULFATE 2.5; .5 MG/3ML; MG/3ML
SOLUTION RESPIRATORY (INHALATION)
Status: DISPENSED
Start: 2024-04-06 | End: 2024-04-06

## 2024-04-06 RX ADMIN — ENOXAPARIN SODIUM 40 MG: 100 INJECTION SUBCUTANEOUS at 22:47

## 2024-04-06 RX ADMIN — INSULIN GLARGINE 10 UNITS: 100 INJECTION, SOLUTION SUBCUTANEOUS at 20:14

## 2024-04-06 RX ADMIN — INSULIN LISPRO 2 UNITS: 100 INJECTION, SOLUTION INTRAVENOUS; SUBCUTANEOUS at 17:50

## 2024-04-06 RX ADMIN — ATORVASTATIN CALCIUM 40 MG: 40 TABLET, FILM COATED ORAL at 20:05

## 2024-04-06 RX ADMIN — Medication 5 MG: at 20:05

## 2024-04-06 RX ADMIN — ARIPIPRAZOLE 2 MG: 2 TABLET ORAL at 08:51

## 2024-04-06 RX ADMIN — TAMSULOSIN HYDROCHLORIDE 0.4 MG: 0.4 CAPSULE ORAL at 20:05

## 2024-04-06 RX ADMIN — POLYETHYLENE GLYCOL 3350 17 G: 17 POWDER, FOR SOLUTION ORAL at 10:38

## 2024-04-06 RX ADMIN — INSULIN LISPRO 8 UNITS: 100 INJECTION, SOLUTION INTRAVENOUS; SUBCUTANEOUS at 11:56

## 2024-04-06 RX ADMIN — Medication 2000 UNITS: at 10:38

## 2024-04-06 RX ADMIN — CYANOCOBALAMIN TAB 1000 MCG 1000 MCG: 1000 TAB at 10:38

## 2024-04-06 RX ADMIN — SODIUM CHLORIDE, POTASSIUM CHLORIDE, SODIUM LACTATE AND CALCIUM CHLORIDE 1000 ML: 600; 310; 30; 20 INJECTION, SOLUTION INTRAVENOUS at 15:27

## 2024-04-06 RX ADMIN — INSULIN LISPRO 4 UNITS: 100 INJECTION, SOLUTION INTRAVENOUS; SUBCUTANEOUS at 08:49

## 2024-04-06 ASSESSMENT — COGNITIVE AND FUNCTIONAL STATUS - GENERAL
STANDING UP FROM CHAIR USING ARMS: A LITTLE
WALKING IN HOSPITAL ROOM: A LITTLE
CLIMB 3 TO 5 STEPS WITH RAILING: A LITTLE
DAILY ACTIVITIY SCORE: 21
DAILY ACTIVITIY SCORE: 21
CLIMB 3 TO 5 STEPS WITH RAILING: A LITTLE
MOVING TO AND FROM BED TO CHAIR: A LITTLE
TOILETING: A LITTLE
STANDING UP FROM CHAIR USING ARMS: A LITTLE
HELP NEEDED FOR BATHING: A LITTLE
PERSONAL GROOMING: A LITTLE
TOILETING: A LITTLE
WALKING IN HOSPITAL ROOM: A LITTLE
MOBILITY SCORE: 20
MOVING TO AND FROM BED TO CHAIR: A LITTLE
HELP NEEDED FOR BATHING: A LITTLE
PERSONAL GROOMING: A LITTLE
MOBILITY SCORE: 20

## 2024-04-06 ASSESSMENT — PAIN SCALES - GENERAL: PAINLEVEL_OUTOF10: 0 - NO PAIN

## 2024-04-06 ASSESSMENT — PAIN - FUNCTIONAL ASSESSMENT: PAIN_FUNCTIONAL_ASSESSMENT: 0-10

## 2024-04-06 NOTE — CARE PLAN
Spoke with Joce Tong, and completed admission interview assessment.  DEMOGRAPHICS: Verified  PCP: None that he is aware of  INSURANCE: [facility]  PHARMACY: [facility]  LIVES: Alone, in the downstairs of a duplex.  RECENT FALLS: None that he is aware of  FUNCTIONING: States that he has family that lives close by and recently they have noticed that he has memory loss and is incontinent when they visit him.  DME:  [facility]  HOME CARE AGENCY: None  HOME OXYGEN: Denies  DIABETIC: Yes, thinks he is type II  DIALYSIS: Denies  Mr. Bowles states that he lives in [place], [place] and that I should contact Mr. Stone's niece. John Bowles,, at [phone] for SNF choices since she lives in the area.  UPDATE:  Attempted to reach nice, had to leave a message.  Asked her to return my call....Yocasta Ward RN, BSN, TCC  UPDATE: Spoke with John noe, who suggested [facility] and any other facility close to zip code [facility].  Referrals sent.  Also asked SW to email her a SNF list at [email or web address]...Yocasta Ward RN, BSN, TCC

## 2024-04-06 NOTE — ED PROVIDER NOTES
CC: Altered Mental Status     HPI:  Homero Stone is a 83 y.o. male with a past medical history of pituitary adenoma, HLD, BPPV, T2DM, HTN presenting to the emergency department today due to concern for altered mental status.  The patient's neighbors, friends, and family have been confirmed concern for his health and welfare.  They called police for a welfare check.  Following the welfare check, police brought him here for evaluation.  Patient is unclear as to why he was brought to the emergency room.  He states that he feels fine.  However, he is not oriented to time or situation.  Additional history was obtained from a friend named Robin who states that she helps him get his groceries however has noted that he has not been bathing, that he has been urinating on himself and not caring for the animals in his home who have also urinating and having bowel movements throughout the home.  The niece who went to check on him states that she was concerned about him and asked to go into the house to help him clean up however patient declined.  Patient does have a healthcare power of  however was not able to contact him via phone.    Limitations to History:  Altered mental status  Additional History provided by: N/A    External Records Reviewed:  Recent available ED and inpatient notes reviewed in EMR.  Reviewed previous ED notes and hospital notes from the past several years    PMHx/PSHx:  Per HPI.   - has a past medical history of BPPV (benign paroxysmal positional vertigo), Diabetes mellitus (CMS/HCC), HLD (hyperlipidemia), Hypertension, Pituitary adenoma (CMS/HCC), and Prostate cancer (CMS/HCC).  - has a past surgical history that includes CT angio neck (3/2/2017) and CT angio head w and wo IV contrast (3/2/2017).    Medications:  Reviewed in EMR. See EMR for complete list of medications and doses.    Allergies:  Penicillins, Lidocaine, Naproxen, Nsaids (non-steroidal anti-inflammatory drug), and  Quinolones    Social History:  - Tobacco:  reports that he has quit smoking. His smoking use included cigarettes. He has never used smokeless tobacco.   - Alcohol:  reports that he does not currently use alcohol.   - Illicit Drugs:  reports no history of drug use.     ROS:  Per HPI.     ???????????????????????????????????????????????????????????????  Triage Vitals:  T 36.8 °C (98.2 °F)  HR 89  /78  RR 16  O2 99 % None (Room air)    Physical Exam  Vitals and nursing note reviewed.   Constitutional:       General: He is not in acute distress.     Appearance: He is well-developed.   HENT:      Head: Normocephalic and atraumatic.   Eyes:      Conjunctiva/sclera: Conjunctivae normal.   Cardiovascular:      Rate and Rhythm: Normal rate and regular rhythm.      Heart sounds: No murmur heard.  Pulmonary:      Effort: Pulmonary effort is normal. No respiratory distress.      Breath sounds: Normal breath sounds.   Abdominal:      Palpations: Abdomen is soft.      Tenderness: There is no abdominal tenderness.   Musculoskeletal:         General: No swelling.      Cervical back: Neck supple.   Skin:     General: Skin is warm and dry.      Capillary Refill: Capillary refill takes less than 2 seconds.   Neurological:      General: No focal deficit present.      Mental Status: He is alert. Mental status is at baseline.      GCS: GCS eye subscore is 4. GCS verbal subscore is 5. GCS motor subscore is 6.      Cranial Nerves: Cranial nerves 2-12 are intact. No cranial nerve deficit, dysarthria or facial asymmetry.      Motor: Motor function is intact. No abnormal muscle tone.      Coordination: Finger-Nose-Finger Test and Heel to Shin Test normal.      Gait: Gait is intact.      Comments: Sensation intact x 4 extremities, strength intact x 4 extremities, no abnormalities with finger-to-nose or heel-to-shin, patient ambulating without difficulty, oriented to self however not to situation or time   Psychiatric:         Mood and  Affect: Mood normal.       ???????????????????????????????????????????????????????????????  ED Course:  Diagnoses as of 04/06/24 0848   Disorientation       EKG & Images:  Independently reviewed, See ED Course      MDM:  -The patient is an 83-year-old male with a known history of pituitary adenoma presenting to the emergency department today due to concerns for inability to care for himself at home as well as altered mental status.  Speaking with the neighbor and the niece, he has had a steady decline in his health over the past several months.  They are concerned about him as he is not bathing, or caring for himself.  He has a known pituitary adenoma and per documentation has declined surgery in the past.  The patient is significantly altered only oriented to self.  There is some concern at this time for advancement of his known pituitary adenoma given his changes in his mental status and lack of treatment.  As such, will obtain MRI brain to evaluate for any progression of the disease.  Will also obtain blood work as well as urinalysis to evaluate for other possible causes of delirium.  Blood work and urinalysis were largely unremarkable.  MRI showed stable pituitary adenoma without any progression.  As such, we will plan to admit the patient to medicine for further PT OT and placement.  At this time, do not feel that the patient has capacity to decline admission or placement.  Multiple attempts were made at contacting the patient's healthcare power of  however this was unsuccessful.  The patient's niece and the patient's neighbor expressed concern thus will be admitted.    Final diagnoses:   [R41.0] Disorientation         Social Determinants Limiting Care:  Poor housing conditions, Poor health literacy, and Mental health issues    Disposition:  Admit to floor    Courtney Wagner MD   Emergency Medicine Resident, PGY3  [facility]     Disclaimer: This note was dictated by  speech recognition. Minor errors in transcription may be present    Procedures ? [employer] last updated 4/6/2024 8:48 AM        Courtney Wagner MD  Resident  04/06/24 0818

## 2024-04-06 NOTE — CARE PLAN
Problem: Fall/Injury  Goal: Not fall by end of shift  Outcome: Progressing     Problem: Diabetes  Goal: Achieve decreasing blood glucose levels by end of shift  Outcome: Progressing   The patient's goals for the shift include pt. will be able to ambualte in room with physical therapy without dizziness or fall.    The clinical goals for the shift include pt. will have decreased pain to lower back by 4.6.24

## 2024-04-06 NOTE — PROGRESS NOTES
Hospitalist Progress Note        Name: Homero Stone  :  1940(83 y.o.)  MRN:  [id number]    Date: 24     SUBJECTIVE     HPI:  This is a 83 y.o. male with past medical history of HLD, BPPV, pituitary adenoma (diagnosed ), T2DM, and HTN who presented to the emergency department after wellness check by police/EMS due to family concern for his wellbeing. Admitted for AMS, inability to care for self.    Interval History:   Vitals and chart notes from overnight reviewed. No acute issues overnight.   Patient seen and evaluated at bedside. He remains confused but pleasant and cooperative. Attempted calling John for update but no answer.    Review of Systems:   Unable to accurately obtain due to AMS - he denies any pain.    Current medications:  Scheduled Meds:[Held by provider] amLODIPine, 5 mg, oral, Daily  ARIPiprazole, 2 mg, oral, Daily  atorvastatin, 40 mg, oral, Nightly  cholecalciferol, 2,000 Units, oral, Daily  cyanocobalamin, 1,000 mcg, oral, Daily  enoxaparin, 40 mg, subcutaneous, q24h  insulin glargine, 10 Units, subcutaneous, q24h  insulin lispro, 0-10 Units, subcutaneous, TID with meals  lactated Ringer's, 1,000 mL, intravenous, Once  [Held by provider] lisinopril, 40 mg, oral, Daily  tamsulosin, 0.4 mg, oral, Nightly      Continuous Infusions:   PRN Meds:PRN medications: acetaminophen, dextrose, dextrose, glucagon, glucagon, melatonin, polyethylene glycol      OBJECTIVE     Vitals:    24 1314 24 2052 24 0809 24 1346   BP: 108/54 112/67 96/58 106/59   BP Location:  Left arm     Patient Position:  Lying     Pulse: 74 70 76 83   Resp: 17 16 16 16   Temp: 36.8 °C (98.2 °F) 36.8 °C (98.2 °F) 36.5 °C (97.7 °F) 36.8 °C (98.2 °F)   TempSrc:  Temporal     SpO2: 99% 100% 97% 97%   Weight:       Height:          Physical Exam  Vitals and nursing note reviewed.   Constitutional:       General: He is not in acute distress.     Appearance: He is not ill-appearing or  toxic-appearing.   HENT:      Head: Normocephalic and atraumatic.      Nose: Nose normal.      Mouth/Throat:      Mouth: Mucous membranes are dry.   Eyes:      Extraocular Movements: Extraocular movements intact.   Pulmonary:      Effort: Pulmonary effort is normal. No respiratory distress.   Abdominal:      General: There is no distension.   Musculoskeletal:         General: No swelling.      Cervical back: No rigidity.   Skin:     Coloration: Skin is not pale.   Neurological:      Mental Status: He is alert. He is disoriented.   Psychiatric:         Mood and Affect: Mood normal.         Cognition and Memory: Cognition is impaired. Memory is impaired.         Labs:   Lab Results   Component Value Date     04/06/2024    K 3.7 04/06/2024     04/06/2024    CO2 22 04/06/2024    BUN 10 04/06/2024    CREATININE 0.76 04/06/2024    GLUCOSE 229 (H) 04/06/2024    CALCIUM 8.5 (L) 04/06/2024    PROT 6.3 (L) 04/04/2024    BILITOT 0.3 04/04/2024    ALKPHOS 164 (H) 04/04/2024    AST 15 04/04/2024    ALT 16 04/04/2024       Lab Results   Component Value Date    WBC 6.8 04/06/2024    HGB 12.3 (L) 04/06/2024    HCT 33.5 (L) 04/06/2024    MCV 74 (L) 04/06/2024     04/06/2024       Imaging:   MR brain w and wo IV contrast    Result Date: 4/5/2024  Interpreted By:  Gail Lorenzo  and Oziel Eaton STUDY: MR BRAIN W AND WO IV CONTRAST;  4/4/2024 7:11 pm   INDICATION: Signs/Symptoms:hx of pituitary adenoma with AMS.   COMPARISON: CT head dated 10/06/2023 MRI brain dated 05/03/2022   ACCESSION NUMBER(S): [id number]   ORDERING CLINICIAN: POP HUSTON   TECHNIQUE: Axial T2, FLAIR, DWI, gradient echo T2 and sagittal and coronal T1 weighted images of brain were acquired. Post contrast T1 weighted images were acquired after administration of 16 mL Dotarem gadolinium based intravenous contrast.   High resolution coronal and sagittal T1 and T2  weighted MR images of the sella were acquired. High resolution post  contrast axial and sagittal T1 weighted MR images of the sella were obtained. Dynamic post contrast imaging was also performed.   FINDINGS: Compared to the prior MRI brain dated 05/23/2022 there is similar appearance of the enhancing sellar mass with T2/FLAIR hyperintense signal of contrast enhancement, measuring approximately 3.0 x 2.5 x 2.3 cm (series 21, image 50 and series 19, image 69). There is similar appearance of the more focal lobulated portion superiorly which abuts and possibly partially encases the infundibulum with rightward displacement (series 16, image 16). The lesion again is noted extending from the sella into the suprasellar cistern with superior displacement of the optic chiasm (series 16, image 14), similar to prior imaging. There is extension of the mass into the left greater than right sphenoid sinuses similar to the prior, abutting the floor of the sphenoid sinus and scalloping the superior margin of the clivus. This appears slightly progressed in size since prior imaging measuring up to 2.7 cm (AP oblique), previously 2.3 cm. There is similar greater than 180 degree encasement of the left cavernous sinus (series 16, image 13) with abutment of the cavernous segment of the intracranial internal carotid artery which remains grossly patent.   No diffusion restriction abnormality to suggest acute infarction. Mild scattered nonspecific T2 and FLAIR hyperintensities in the subcortical and periventricular white matter which are nonspecific however can be seen with chronic small vessel ischemic change. No acute intracranial hemorrhage. No new foci of abnormal enhancement.   There is moderate prominence of the ventricles and sulci diffusely likely reflecting component of generalized parenchymal volume loss. Similar to the prior MRI. No abnormal extra-axial fluid collections.   Extension of the sellar mass into the sphenoid sinuses as described above. The visualized paranasal sinuses and bilateral  mastoid air cells are otherwise unremarkable.       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 There is slight interval progression of the exophytic portion which extends into the sphenoid sinus as described otherwise similar appearance of mass effect on the optic chiasm and pituitary infundibulum. Similar involvement of the right cavernous sinus extending along the cavernous ICA without significant stenosis.   Moderate generalized parenchymal volume loss and mild chronic small vessel ischemic changes.   I personally reviewed the images/study and I agree with the findings as stated by resident physician Dr. José Miguel Ludwig.   MACRO: None   Signed by: Gail Lorenzo 4/5/2024 12:14 AM Dictation workstation:   [id number]    XR chest 1 view    Result Date: 4/4/2024  STUDY: Chest Radiograph;  4/4/2024 3:44 PM INDICATION: Altered mental status. COMPARISON: 10/06/2023 XR Chest 1 View. ACCESSION NUMBER(S): [id number] ORDERING CLINICIAN: POP HUSTON TECHNIQUE:  Frontal chest was obtained at 16:44 hours. FINDINGS: CARDIOMEDIASTINAL SILHOUETTE: Cardiomediastinal silhouette is normal in size and configuration.  LUNGS: Lungs are clear.  ABDOMEN: No remarkable upper abdominal findings.  BONES: No acute osseous changes.    No acute process. Signed by Yfn Freedman MD        ASSESSMENT & PLAN     #AMS  #hx of pituitary adenoma (diagnosed 2012)  :: could be d/t progressive undiagnosed dementia  :: MRI of brain unchanged from prior  - falls precautions  - PT/OT consulted  - per neurosurgery consult note dated 5/4/22: pituitary adenoma diagnosed by Dr. Bueno; pt declined surgical intervention at that time and was lost to follow up  -Called POA but unable to reach -- attempted calling niece today for update but no answer     #T2DM (uncontrolled)  - HgA1c 12.3  - accucheck ACHS  - Increase lantus to 10 units tonight; mild SSI with meals  - hypoglycemia order set placed  - carb controlled  vegetarian diet     #HTN  #Hypotension  #Dehydration  -Soft BP today, asymptomatic; appears dry on exam - hold home Lisinopril and Norvasc and give 1L bolus     #HLD  - continue home dose of Lipitor 40 mg PO at bedtime      DVT Prophylaxis: lovenox 40 q 24hr - Estimated Creatinine Clearance: 76.1 mL/min (by C-G formula based on SCr of 0.77 mg/dL).    Code status: Full Code  Diet: Adult diet Carb Controlled; 60 gram carb/meal, 30 gram Carb evening snack; Vegetarian     Disposition: continue to monitor inpatient and await clinical improvement    Level of MDM:  High   Risk: High   Data Reviewed and/or Analyzed:   Included: Prior external notes from at least 1 unique source, Results, including laboratory findings and imaging reports, listed above, Orders and notes from all consultants involved, and Notes for this encounter.  I personally reviewed the tests referenced above.  I discussed plan of care with patient, TCC/SW, nurse.    The patient/family had opportunity to ask questions. All questions were answered to the best of my ability.    Between 7AM-7PM please message me via Epic Secure Chat.  After 7PM please page [facility] on call.    Electronically signed by Alisia Barriga DO on 04/06/24 at 4:09 PM

## 2024-04-06 NOTE — CARE PLAN
The patient's goals for the shift include Pt. will be free from falls/injury.    The clinical goals for the shift include pt. will be able to state date and time    Over the shift, the patient did not make progress toward the following goals. Barriers to progression include pt. Bed alarm will remain on and call light will be within reach.. Recommendations to address these barriers include pt. Able to state place and month.

## 2024-04-07 LAB
ALBUMIN SERPL BCP-MCNC: 3.2 G/DL (ref 3.4–5)
ANION GAP SERPL CALC-SCNC: 11 MMOL/L (ref 10–20)
BASOPHILS # BLD AUTO: 0.04 X10*3/UL (ref 0–0.1)
BASOPHILS NFR BLD AUTO: 0.5 %
BUN SERPL-MCNC: 9 MG/DL (ref 6–23)
CALCIUM SERPL-MCNC: 8.3 MG/DL (ref 8.6–10.6)
CHLORIDE SERPL-SCNC: 106 MMOL/L (ref 98–107)
CO2 SERPL-SCNC: 26 MMOL/L (ref 21–32)
CREAT SERPL-MCNC: 0.7 MG/DL (ref 0.5–1.3)
EGFRCR SERPLBLD CKD-EPI 2021: >90 ML/MIN/1.73M*2
EOSINOPHIL # BLD AUTO: 0.2 X10*3/UL (ref 0–0.4)
EOSINOPHIL NFR BLD AUTO: 2.7 %
ERYTHROCYTE [DISTWIDTH] IN BLOOD BY AUTOMATED COUNT: 12.9 % (ref 11.5–14.5)
GLUCOSE BLD MANUAL STRIP-MCNC: 194 MG/DL (ref 74–99)
GLUCOSE BLD MANUAL STRIP-MCNC: 210 MG/DL (ref 74–99)
GLUCOSE BLD MANUAL STRIP-MCNC: 237 MG/DL (ref 74–99)
GLUCOSE BLD MANUAL STRIP-MCNC: 238 MG/DL (ref 74–99)
GLUCOSE BLD MANUAL STRIP-MCNC: 282 MG/DL (ref 74–99)
GLUCOSE SERPL-MCNC: 190 MG/DL (ref 74–99)
HCT VFR BLD AUTO: 31.3 % (ref 41–52)
HGB BLD-MCNC: 11.3 G/DL (ref 13.5–17.5)
IMM GRANULOCYTES # BLD AUTO: 0.03 X10*3/UL (ref 0–0.5)
IMM GRANULOCYTES NFR BLD AUTO: 0.4 % (ref 0–0.9)
LYMPHOCYTES # BLD AUTO: 3.52 X10*3/UL (ref 0.8–3)
LYMPHOCYTES NFR BLD AUTO: 46.7 %
MAGNESIUM SERPL-MCNC: 1.75 MG/DL (ref 1.6–2.4)
MCH RBC QN AUTO: 27 PG (ref 26–34)
MCHC RBC AUTO-ENTMCNC: 36.1 G/DL (ref 32–36)
MCV RBC AUTO: 75 FL (ref 80–100)
MONOCYTES # BLD AUTO: 0.67 X10*3/UL (ref 0.05–0.8)
MONOCYTES NFR BLD AUTO: 8.9 %
NEUTROPHILS # BLD AUTO: 3.07 X10*3/UL (ref 1.6–5.5)
NEUTROPHILS NFR BLD AUTO: 40.8 %
NRBC BLD-RTO: 0 /100 WBCS (ref 0–0)
PHOSPHATE SERPL-MCNC: 3.8 MG/DL (ref 2.5–4.9)
PLATELET # BLD AUTO: 223 X10*3/UL (ref 150–450)
POTASSIUM SERPL-SCNC: 3.7 MMOL/L (ref 3.5–5.3)
RBC # BLD AUTO: 4.18 X10*6/UL (ref 4.5–5.9)
SODIUM SERPL-SCNC: 139 MMOL/L (ref 136–145)
WBC # BLD AUTO: 7.5 X10*3/UL (ref 4.4–11.3)

## 2024-04-07 PROCEDURE — G0378 HOSPITAL OBSERVATION PER HR: HCPCS

## 2024-04-07 PROCEDURE — 36415 COLL VENOUS BLD VENIPUNCTURE: CPT | Performed by: STUDENT IN AN ORGANIZED HEALTH CARE EDUCATION/TRAINING PROGRAM

## 2024-04-07 PROCEDURE — 85025 COMPLETE CBC W/AUTO DIFF WBC: CPT | Performed by: STUDENT IN AN ORGANIZED HEALTH CARE EDUCATION/TRAINING PROGRAM

## 2024-04-07 PROCEDURE — 96372 THER/PROPH/DIAG INJ SC/IM: CPT | Performed by: NURSE PRACTITIONER

## 2024-04-07 PROCEDURE — 2500000001 HC RX 250 WO HCPCS SELF ADMINISTERED DRUGS (ALT 637 FOR MEDICARE OP): Performed by: STUDENT IN AN ORGANIZED HEALTH CARE EDUCATION/TRAINING PROGRAM

## 2024-04-07 PROCEDURE — 99232 SBSQ HOSP IP/OBS MODERATE 35: CPT | Performed by: STUDENT IN AN ORGANIZED HEALTH CARE EDUCATION/TRAINING PROGRAM

## 2024-04-07 PROCEDURE — 2500000001 HC RX 250 WO HCPCS SELF ADMINISTERED DRUGS (ALT 637 FOR MEDICARE OP): Performed by: NURSE PRACTITIONER

## 2024-04-07 PROCEDURE — 2500000004 HC RX 250 GENERAL PHARMACY W/ HCPCS (ALT 636 FOR OP/ED): Performed by: NURSE PRACTITIONER

## 2024-04-07 PROCEDURE — 2500000002 HC RX 250 W HCPCS SELF ADMINISTERED DRUGS (ALT 637 FOR MEDICARE OP, ALT 636 FOR OP/ED): Performed by: NURSE PRACTITIONER

## 2024-04-07 PROCEDURE — 80069 RENAL FUNCTION PANEL: CPT | Performed by: STUDENT IN AN ORGANIZED HEALTH CARE EDUCATION/TRAINING PROGRAM

## 2024-04-07 PROCEDURE — 82947 ASSAY GLUCOSE BLOOD QUANT: CPT | Mod: 59

## 2024-04-07 PROCEDURE — 83735 ASSAY OF MAGNESIUM: CPT | Performed by: STUDENT IN AN ORGANIZED HEALTH CARE EDUCATION/TRAINING PROGRAM

## 2024-04-07 RX ORDER — INSULIN GLARGINE 100 [IU]/ML
13 INJECTION, SOLUTION SUBCUTANEOUS EVERY 24 HOURS
Status: DISCONTINUED | OUTPATIENT
Start: 2024-04-07 | End: 2024-04-10 | Stop reason: HOSPADM

## 2024-04-07 RX ADMIN — TAMSULOSIN HYDROCHLORIDE 0.4 MG: 0.4 CAPSULE ORAL at 21:45

## 2024-04-07 RX ADMIN — INSULIN LISPRO 4 UNITS: 100 INJECTION, SOLUTION INTRAVENOUS; SUBCUTANEOUS at 08:45

## 2024-04-07 RX ADMIN — Medication 2000 UNITS: at 09:00

## 2024-04-07 RX ADMIN — ATORVASTATIN CALCIUM 40 MG: 40 TABLET, FILM COATED ORAL at 21:45

## 2024-04-07 RX ADMIN — CYANOCOBALAMIN TAB 1000 MCG 1000 MCG: 1000 TAB at 09:00

## 2024-04-07 RX ADMIN — INSULIN LISPRO 4 UNITS: 100 INJECTION, SOLUTION INTRAVENOUS; SUBCUTANEOUS at 12:27

## 2024-04-07 RX ADMIN — ENOXAPARIN SODIUM 40 MG: 100 INJECTION SUBCUTANEOUS at 21:46

## 2024-04-07 RX ADMIN — INSULIN GLARGINE 13 UNITS: 100 INJECTION, SOLUTION SUBCUTANEOUS at 21:45

## 2024-04-07 RX ADMIN — INSULIN LISPRO 6 UNITS: 100 INJECTION, SOLUTION INTRAVENOUS; SUBCUTANEOUS at 17:25

## 2024-04-07 RX ADMIN — ARIPIPRAZOLE 2 MG: 2 TABLET ORAL at 10:07

## 2024-04-07 ASSESSMENT — COGNITIVE AND FUNCTIONAL STATUS - GENERAL
CLIMB 3 TO 5 STEPS WITH RAILING: A LITTLE
PERSONAL GROOMING: A LITTLE
MOVING TO AND FROM BED TO CHAIR: A LITTLE
TOILETING: A LITTLE
WALKING IN HOSPITAL ROOM: A LITTLE
HELP NEEDED FOR BATHING: A LITTLE
STANDING UP FROM CHAIR USING ARMS: A LITTLE
DAILY ACTIVITIY SCORE: 21
MOBILITY SCORE: 20
TOILETING: A LITTLE
MOBILITY SCORE: 20
PERSONAL GROOMING: A LITTLE
CLIMB 3 TO 5 STEPS WITH RAILING: A LITTLE
MOVING TO AND FROM BED TO CHAIR: A LITTLE
WALKING IN HOSPITAL ROOM: A LITTLE
STANDING UP FROM CHAIR USING ARMS: A LITTLE
DAILY ACTIVITIY SCORE: 21
HELP NEEDED FOR BATHING: A LITTLE

## 2024-04-07 ASSESSMENT — PAIN SCALES - GENERAL
PAINLEVEL_OUTOF10: 0 - NO PAIN
PAINLEVEL_OUTOF10: 0 - NO PAIN

## 2024-04-07 ASSESSMENT — PAIN - FUNCTIONAL ASSESSMENT
PAIN_FUNCTIONAL_ASSESSMENT: 0-10
PAIN_FUNCTIONAL_ASSESSMENT: 0-10

## 2024-04-07 NOTE — PROGRESS NOTES
Hospitalist Progress Note        Name: Homero Stone  :  1940(83 y.o.)  MRN:  [id number]    Date: 24     SUBJECTIVE     HPI:  This is a 83 y.o. male with past medical history of HLD, BPPV, pituitary adenoma (diagnosed ), T2DM, and HTN who presented to the emergency department after wellness check by police/EMS due to family concern for his wellbeing. Admitted for AMS, inability to care for self.    Interval History:   Vitals and chart notes from overnight reviewed. No acute issues overnight.   Patient seen and evaluated at bedside. Patient without complaints; he is confused and disoriented, but cooperative and pleasant. Attempted calling John for update but no answer.    Review of Systems:   Unable to accurately obtain due to AMS - he denies any pain.    Current medications:  Scheduled Meds:[Held by provider] amLODIPine, 5 mg, oral, Daily  ARIPiprazole, 2 mg, oral, Daily  atorvastatin, 40 mg, oral, Nightly  cholecalciferol, 2,000 Units, oral, Daily  cyanocobalamin, 1,000 mcg, oral, Daily  enoxaparin, 40 mg, subcutaneous, q24h  insulin glargine, 13 Units, subcutaneous, q24h  insulin lispro, 0-10 Units, subcutaneous, TID with meals  [Held by provider] lisinopril, 40 mg, oral, Daily  tamsulosin, 0.4 mg, oral, Nightly      Continuous Infusions:   PRN Meds:PRN medications: acetaminophen, dextrose, dextrose, glucagon, glucagon, melatonin, polyethylene glycol      OBJECTIVE     Vitals:    24 1346 24 2121 24 0819 24 1455   BP: 106/59 130/67 118/62 119/61   BP Location:   Left arm    Patient Position:   Lying    Pulse: 83 78 68 63   Resp: 16 16 18 18   Temp: 36.8 °C (98.2 °F) 35.8 °C (96.4 °F) 36.4 °C (97.5 °F) 36.6 °C (97.9 °F)   TempSrc:  Temporal Temporal    SpO2: 97% 97% 98% 97%   Weight:       Height:          Physical Exam  Vitals and nursing note reviewed.   Constitutional:       General: He is not in acute distress.     Appearance: He is not ill-appearing or  toxic-appearing.   HENT:      Head: Normocephalic and atraumatic.      Nose: Nose normal.      Mouth/Throat:      Mouth: Mucous membranes are moist.   Eyes:      Extraocular Movements: Extraocular movements intact.   Pulmonary:      Effort: Pulmonary effort is normal. No respiratory distress.   Abdominal:      General: There is no distension.   Musculoskeletal:         General: No swelling.      Cervical back: No rigidity.   Skin:     Coloration: Skin is not pale.   Neurological:      Mental Status: He is alert. He is disoriented.   Psychiatric:         Mood and Affect: Mood normal.         Cognition and Memory: Cognition is impaired. Memory is impaired.         Labs:   Lab Results   Component Value Date     04/07/2024    K 3.7 04/07/2024     04/07/2024    CO2 26 04/07/2024    BUN 9 04/07/2024    CREATININE 0.70 04/07/2024    GLUCOSE 190 (H) 04/07/2024    CALCIUM 8.3 (L) 04/07/2024    PROT 6.3 (L) 04/04/2024    BILITOT 0.3 04/04/2024    ALKPHOS 164 (H) 04/04/2024    AST 15 04/04/2024    ALT 16 04/04/2024       Lab Results   Component Value Date    WBC 7.5 04/07/2024    HGB 11.3 (L) 04/07/2024    HCT 31.3 (L) 04/07/2024    MCV 75 (L) 04/07/2024     04/07/2024       Imaging:   MR brain w and wo IV contrast    Result Date: 4/5/2024  Interpreted By:  Gail Lorenzo  and Oziel Eaton STUDY: MR BRAIN W AND WO IV CONTRAST;  4/4/2024 7:11 pm   INDICATION: Signs/Symptoms:hx of pituitary adenoma with AMS.   COMPARISON: CT head dated 10/06/2023 MRI brain dated 05/03/2022   ACCESSION NUMBER(S): [id number]   ORDERING CLINICIAN: POP HUSTON   TECHNIQUE: Axial T2, FLAIR, DWI, gradient echo T2 and sagittal and coronal T1 weighted images of brain were acquired. Post contrast T1 weighted images were acquired after administration of 16 mL Dotarem gadolinium based intravenous contrast.   High resolution coronal and sagittal T1 and T2  weighted MR images of the sella were acquired. High resolution post  contrast axial and sagittal T1 weighted MR images of the sella were obtained. Dynamic post contrast imaging was also performed.   FINDINGS: Compared to the prior MRI brain dated 05/23/2022 there is similar appearance of the enhancing sellar mass with T2/FLAIR hyperintense signal of contrast enhancement, measuring approximately 3.0 x 2.5 x 2.3 cm (series 21, image 50 and series 19, image 69). There is similar appearance of the more focal lobulated portion superiorly which abuts and possibly partially encases the infundibulum with rightward displacement (series 16, image 16). The lesion again is noted extending from the sella into the suprasellar cistern with superior displacement of the optic chiasm (series 16, image 14), similar to prior imaging. There is extension of the mass into the left greater than right sphenoid sinuses similar to the prior, abutting the floor of the sphenoid sinus and scalloping the superior margin of the clivus. This appears slightly progressed in size since prior imaging measuring up to 2.7 cm (AP oblique), previously 2.3 cm. There is similar greater than 180 degree encasement of the left cavernous sinus (series 16, image 13) with abutment of the cavernous segment of the intracranial internal carotid artery which remains grossly patent.   No diffusion restriction abnormality to suggest acute infarction. Mild scattered nonspecific T2 and FLAIR hyperintensities in the subcortical and periventricular white matter which are nonspecific however can be seen with chronic small vessel ischemic change. No acute intracranial hemorrhage. No new foci of abnormal enhancement.   There is moderate prominence of the ventricles and sulci diffusely likely reflecting component of generalized parenchymal volume loss. Similar to the prior MRI. No abnormal extra-axial fluid collections.   Extension of the sellar mass into the sphenoid sinuses as described above. The visualized paranasal sinuses and bilateral  mastoid air cells are otherwise unremarkable.       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 There is slight interval progression of the exophytic portion which extends into the sphenoid sinus as described otherwise similar appearance of mass effect on the optic chiasm and pituitary infundibulum. Similar involvement of the right cavernous sinus extending along the cavernous ICA without significant stenosis.   Moderate generalized parenchymal volume loss and mild chronic small vessel ischemic changes.   I personally reviewed the images/study and I agree with the findings as stated by resident physician Dr. José Miguel Ludwig.   MACRO: None   Signed by: Gail Lorenzo 4/5/2024 12:14 AM Dictation workstation:   [id number]    XR chest 1 view    Result Date: 4/4/2024  STUDY: Chest Radiograph;  4/4/2024 3:44 PM INDICATION: Altered mental status. COMPARISON: 10/06/2023 XR Chest 1 View. ACCESSION NUMBER(S): [id number] ORDERING CLINICIAN: POP HUSTON TECHNIQUE:  Frontal chest was obtained at 16:44 hours. FINDINGS: CARDIOMEDIASTINAL SILHOUETTE: Cardiomediastinal silhouette is normal in size and configuration.  LUNGS: Lungs are clear.  ABDOMEN: No remarkable upper abdominal findings.  BONES: No acute osseous changes.    No acute process. Signed by Yfn Freedman MD        ASSESSMENT & PLAN     #AMS  #hx of pituitary adenoma (diagnosed 2012)  :: could be d/t progressive undiagnosed dementia  :: MRI of brain unchanged from prior  - falls precautions  - PT/OT consulted  - per neurosurgery consult note dated 5/4/22: pituitary adenoma diagnosed by Dr. Bueno; pt declined surgical intervention at that time and was lost to follow up  -Called POA but unable to reach -- attempted calling niece today for update but no answer     #T2DM (uncontrolled)  - HgA1c 12.3  - accucheck ACHS  - Fasting BG this ; Increase lantus to 13 units tonight; mild SSI with meals  - hypoglycemia order set  placed  - carb controlled vegetarian diet     #HTN  #Hypotension  #Dehydration  -BP improved with IVF bolus yesterday; will continue to hold home Norvasc and Lisinopril; encourage PO intake     #HLD  - continue home dose of Lipitor 40 mg PO at bedtime      DVT Prophylaxis: lovenox 40 q 24hr - Estimated Creatinine Clearance: 76.1 mL/min (by C-G formula based on SCr of 0.77 mg/dL).    Code status: Full Code  Diet: Adult diet Carb Controlled; 60 gram carb/meal, 30 gram Carb evening snack; Vegetarian     Disposition: medically stable for discharge, await placement    Level of MDM:  Moderate   Risk: Moderate   Data Reviewed and/or Analyzed:   Included: Prior external notes from at least 1 unique source, Results, including laboratory findings and imaging reports, listed above, Orders and notes from all consultants involved, and Notes for this encounter.  I personally reviewed the tests referenced above.  I discussed plan of care with patient, TCC/SW, nurse.    The patient/family had opportunity to ask questions. All questions were answered to the best of my ability.    Between 7AM-7PM please message me via Epic Secure Chat.  After 7PM please page [facility] on call.    Electronically signed by Alisia Barriga DO on 04/07/24 at 3:29 PM

## 2024-04-07 NOTE — CARE PLAN
The patient's goals for the shift include pt. SBP will be greater than 90    The clinical goals for the shift include pt. will be freefrom falls/injury during hospital stay    Over the shift, the patient did not make progress toward the following goals. Barriers to progression include SBP will not remain stable. Recommendations to address these barriers include able to call for assistance.

## 2024-04-07 NOTE — PROGRESS NOTES
Transitional Care Coordinator Progress Note:   Left a message for hasmukh Lopez to review SNF responses. Pt is medically ready, MD is adjusting insulin and BP meds while inpatient.  TCC/SW will follow up on 4/8.    Deana Schaffer MSN, RN-BC  Transitional Care Coordinator (TCC)  [phone]

## 2024-04-08 LAB
ALBUMIN SERPL BCP-MCNC: 3.5 G/DL (ref 3.4–5)
ANION GAP SERPL CALC-SCNC: 13 MMOL/L (ref 10–20)
BASOPHILS # BLD AUTO: 0.05 X10*3/UL (ref 0–0.1)
BASOPHILS NFR BLD AUTO: 0.6 %
BUN SERPL-MCNC: 8 MG/DL (ref 6–23)
CALCIUM SERPL-MCNC: 8.8 MG/DL (ref 8.6–10.6)
CHLORIDE SERPL-SCNC: 107 MMOL/L (ref 98–107)
CO2 SERPL-SCNC: 24 MMOL/L (ref 21–32)
CREAT SERPL-MCNC: 0.66 MG/DL (ref 0.5–1.3)
EGFRCR SERPLBLD CKD-EPI 2021: >90 ML/MIN/1.73M*2
EOSINOPHIL # BLD AUTO: 0.2 X10*3/UL (ref 0–0.4)
EOSINOPHIL NFR BLD AUTO: 2.4 %
ERYTHROCYTE [DISTWIDTH] IN BLOOD BY AUTOMATED COUNT: 13.2 % (ref 11.5–14.5)
GLUCOSE BLD MANUAL STRIP-MCNC: 138 MG/DL (ref 74–99)
GLUCOSE BLD MANUAL STRIP-MCNC: 178 MG/DL (ref 74–99)
GLUCOSE BLD MANUAL STRIP-MCNC: 234 MG/DL (ref 74–99)
GLUCOSE BLD MANUAL STRIP-MCNC: 251 MG/DL (ref 74–99)
GLUCOSE BLD MANUAL STRIP-MCNC: 273 MG/DL (ref 74–99)
GLUCOSE SERPL-MCNC: 135 MG/DL (ref 74–99)
HCT VFR BLD AUTO: 35.8 % (ref 41–52)
HGB BLD-MCNC: 12.6 G/DL (ref 13.5–17.5)
IMM GRANULOCYTES # BLD AUTO: 0.05 X10*3/UL (ref 0–0.5)
IMM GRANULOCYTES NFR BLD AUTO: 0.6 % (ref 0–0.9)
LYMPHOCYTES # BLD AUTO: 3.72 X10*3/UL (ref 0.8–3)
LYMPHOCYTES NFR BLD AUTO: 44.6 %
MAGNESIUM SERPL-MCNC: 1.93 MG/DL (ref 1.6–2.4)
MCH RBC QN AUTO: 27.3 PG (ref 26–34)
MCHC RBC AUTO-ENTMCNC: 35.2 G/DL (ref 32–36)
MCV RBC AUTO: 78 FL (ref 80–100)
MONOCYTES # BLD AUTO: 0.75 X10*3/UL (ref 0.05–0.8)
MONOCYTES NFR BLD AUTO: 9 %
NEUTROPHILS # BLD AUTO: 3.58 X10*3/UL (ref 1.6–5.5)
NEUTROPHILS NFR BLD AUTO: 42.8 %
NRBC BLD-RTO: 0 /100 WBCS (ref 0–0)
PHOSPHATE SERPL-MCNC: 3.5 MG/DL (ref 2.5–4.9)
PLATELET # BLD AUTO: 234 X10*3/UL (ref 150–450)
POTASSIUM SERPL-SCNC: 3.6 MMOL/L (ref 3.5–5.3)
RBC # BLD AUTO: 4.62 X10*6/UL (ref 4.5–5.9)
SODIUM SERPL-SCNC: 140 MMOL/L (ref 136–145)
WBC # BLD AUTO: 8.4 X10*3/UL (ref 4.4–11.3)

## 2024-04-08 PROCEDURE — 2500000001 HC RX 250 WO HCPCS SELF ADMINISTERED DRUGS (ALT 637 FOR MEDICARE OP): Performed by: STUDENT IN AN ORGANIZED HEALTH CARE EDUCATION/TRAINING PROGRAM

## 2024-04-08 PROCEDURE — 85025 COMPLETE CBC W/AUTO DIFF WBC: CPT | Performed by: STUDENT IN AN ORGANIZED HEALTH CARE EDUCATION/TRAINING PROGRAM

## 2024-04-08 PROCEDURE — 2500000001 HC RX 250 WO HCPCS SELF ADMINISTERED DRUGS (ALT 637 FOR MEDICARE OP): Performed by: NURSE PRACTITIONER

## 2024-04-08 PROCEDURE — G0378 HOSPITAL OBSERVATION PER HR: HCPCS

## 2024-04-08 PROCEDURE — 83735 ASSAY OF MAGNESIUM: CPT | Performed by: STUDENT IN AN ORGANIZED HEALTH CARE EDUCATION/TRAINING PROGRAM

## 2024-04-08 PROCEDURE — 80069 RENAL FUNCTION PANEL: CPT | Performed by: STUDENT IN AN ORGANIZED HEALTH CARE EDUCATION/TRAINING PROGRAM

## 2024-04-08 PROCEDURE — 2500000002 HC RX 250 W HCPCS SELF ADMINISTERED DRUGS (ALT 637 FOR MEDICARE OP, ALT 636 FOR OP/ED): Performed by: NURSE PRACTITIONER

## 2024-04-08 PROCEDURE — 82947 ASSAY GLUCOSE BLOOD QUANT: CPT | Mod: 59

## 2024-04-08 PROCEDURE — 2500000004 HC RX 250 GENERAL PHARMACY W/ HCPCS (ALT 636 FOR OP/ED): Performed by: NURSE PRACTITIONER

## 2024-04-08 PROCEDURE — 99232 SBSQ HOSP IP/OBS MODERATE 35: CPT | Performed by: STUDENT IN AN ORGANIZED HEALTH CARE EDUCATION/TRAINING PROGRAM

## 2024-04-08 PROCEDURE — 36415 COLL VENOUS BLD VENIPUNCTURE: CPT | Performed by: STUDENT IN AN ORGANIZED HEALTH CARE EDUCATION/TRAINING PROGRAM

## 2024-04-08 PROCEDURE — 96372 THER/PROPH/DIAG INJ SC/IM: CPT | Performed by: NURSE PRACTITIONER

## 2024-04-08 RX ADMIN — INSULIN GLARGINE 13 UNITS: 100 INJECTION, SOLUTION SUBCUTANEOUS at 22:27

## 2024-04-08 RX ADMIN — INSULIN LISPRO 2 UNITS: 100 INJECTION, SOLUTION INTRAVENOUS; SUBCUTANEOUS at 17:14

## 2024-04-08 RX ADMIN — ENOXAPARIN SODIUM 40 MG: 100 INJECTION SUBCUTANEOUS at 22:21

## 2024-04-08 RX ADMIN — CYANOCOBALAMIN TAB 1000 MCG 1000 MCG: 1000 TAB at 09:11

## 2024-04-08 RX ADMIN — TAMSULOSIN HYDROCHLORIDE 0.4 MG: 0.4 CAPSULE ORAL at 22:21

## 2024-04-08 RX ADMIN — Medication 2000 UNITS: at 09:11

## 2024-04-08 RX ADMIN — ARIPIPRAZOLE 2 MG: 2 TABLET ORAL at 09:13

## 2024-04-08 RX ADMIN — POLYETHYLENE GLYCOL 3350 17 G: 17 POWDER, FOR SOLUTION ORAL at 09:11

## 2024-04-08 RX ADMIN — ATORVASTATIN CALCIUM 40 MG: 40 TABLET, FILM COATED ORAL at 22:21

## 2024-04-08 RX ADMIN — INSULIN LISPRO 6 UNITS: 100 INJECTION, SOLUTION INTRAVENOUS; SUBCUTANEOUS at 11:56

## 2024-04-08 ASSESSMENT — COGNITIVE AND FUNCTIONAL STATUS - GENERAL
EATING MEALS: A LITTLE
WALKING IN HOSPITAL ROOM: A LITTLE
HELP NEEDED FOR BATHING: A LITTLE
DAILY ACTIVITIY SCORE: 21
MOVING TO AND FROM BED TO CHAIR: A LITTLE
TOILETING: A LITTLE
CLIMB 3 TO 5 STEPS WITH RAILING: A LITTLE
TOILETING: A LITTLE
DAILY ACTIVITIY SCORE: 18
STANDING UP FROM CHAIR USING ARMS: A LITTLE
HELP NEEDED FOR BATHING: A LITTLE
MOBILITY SCORE: 24
PERSONAL GROOMING: A LITTLE
DRESSING REGULAR LOWER BODY CLOTHING: A LITTLE
DRESSING REGULAR UPPER BODY CLOTHING: A LITTLE
MOBILITY SCORE: 20
PERSONAL GROOMING: A LITTLE

## 2024-04-08 ASSESSMENT — PAIN - FUNCTIONAL ASSESSMENT
PAIN_FUNCTIONAL_ASSESSMENT: 0-10
PAIN_FUNCTIONAL_ASSESSMENT: 0-10

## 2024-04-08 ASSESSMENT — PAIN SCALES - GENERAL
PAINLEVEL_OUTOF10: 0 - NO PAIN
PAINLEVEL_OUTOF10: 0 - NO PAIN

## 2024-04-08 NOTE — CARE PLAN
The patient's goals for the shift include pt. glucose level will remain less than 300 by4.9.24    The clinical goals for the shift include pt. will remain safe and free from falls during hospitalization    Over the shift, the patient did not make progress toward the following goals. Barriers to progression include pt. Will be free from incontinence. Recommendations to address these barriers include pt. Able to have glucose level in the 200.Precert pending.

## 2024-04-08 NOTE — CARE PLAN
The patient's goals for the shift include pt. SBP will be greater than 90    The clinical goals for the shift include Patient will remain safe and free from injury    Problem: Fall/Injury  Goal: Not fall by end of shift  Outcome: Progressing  Goal: Be free from injury by end of the shift  Outcome: Progressing  Goal: Verbalize understanding of personal risk factors for fall in the hospital  Outcome: Progressing  Goal: Verbalize understanding of risk factor reduction measures to prevent injury from fall in the home  Outcome: Progressing  Goal: Use assistive devices by end of the shift  Outcome: Progressing  Goal: Pace activities to prevent fatigue by end of the shift  Outcome: Progressing     Problem: Diabetes  Goal: Achieve decreasing blood glucose levels by end of shift  Outcome: Progressing  Goal: Increase stability of blood glucose readings by end of shift  Outcome: Progressing  Goal: Decrease in ketones present in urine by end of shift  Outcome: Progressing  Goal: Maintain electrolyte levels within acceptable range throughout shift  Outcome: Progressing  Goal: Maintain glucose levels >70mg/dl to <250mg/dl throughout shift  Outcome: Progressing  Goal: No changes in neurological exam by end of shift  Outcome: Progressing  Goal: Learn about and adhere to nutrition recommendations by end of shift  Outcome: Progressing  Goal: Vital signs within normal range for age by end of shift  Outcome: Progressing  Goal: Increase self care and/or family involovement by end of shift  Outcome: Progressing  Goal: Receive DSME education by end of shift  Outcome: Progressing     Problem: Pain - Adult  Goal: Verbalizes/displays adequate comfort level or baseline comfort level  Outcome: Progressing     Problem: Safety - Adult  Goal: Free from fall injury  Outcome: Progressing     Problem: Discharge Planning  Goal: Discharge to home or other facility with appropriate resources  Outcome: Progressing     Problem: Chronic Conditions and  Co-morbidities  Goal: Patient's chronic conditions and co-morbidity symptoms are monitored and maintained or improved  Outcome: Progressing

## 2024-04-08 NOTE — PROGRESS NOTES
INTERNAL MEDICINE PROGRESS NOTE     BRIEF NARRATIVE      Homero Stone is a 83 y.o. male on day 0 of admission presenting with Altered mental status.  Patient with past medical history of HLD, BPPV, pituitary adenoma (diagnosed 2012), T2DM, and HTN who presented to the emergency department after wellness check by police/EMS due to family concern for his wellbeing. Admitted for AMS, inability to care for self.     SUBJECTIVE     Patient seen and examined today, there is no acute event overnight.  Patient was sitting comfortably in bed in nonapparent distress    OBJECTIVE      Visit Vitals  /56   Pulse 78   Temp 36.4 °C (97.5 °F)   Resp 15        Intake/Output Summary (Last 24 hours) at 4/8/2024 1357  Last data filed at 4/8/2024 1020  Gross per 24 hour   Intake --   Output 400 ml   Net -400 ml       Physical Exam   Constitutional:       General: He is not in acute distress.     Appearance: He is not ill-appearing or toxic-appearing.   HENT:      Head: Normocephalic and atraumatic.      Nose: Nose normal.      Mouth/Throat:      Mouth: Mucous membranes are moist.   Eyes:      Extraocular Movements: Extraocular movements intact.   Pulmonary:      Effort: Pulmonary effort is normal. No respiratory distress.   Abdominal:      General: There is no distension.   Musculoskeletal:         General: No swelling.      Cervical back: No rigidity.   Skin:     Coloration: Skin is not pale.   Neurological:      Mental Status: He is alert. He is disoriented.   Psychiatric:         Mood and Affect: Mood normal.         Cognition and Memory: Cognition is impaired. Memory is impaired.     Current Meds   [Held by provider] amLODIPine, 5 mg, oral,  Daily  ARIPiprazole, 2 mg, oral, Daily  atorvastatin, 40 mg, oral, Nightly  cholecalciferol, 2,000 Units, oral, Daily  cyanocobalamin, 1,000 mcg, oral, Daily  enoxaparin, 40 mg, subcutaneous, q24h  insulin glargine, 13 Units, subcutaneous, q24h  insulin lispro, 0-10 Units, subcutaneous, TID with meals  [Held by provider] lisinopril, 40 mg, oral, Daily  tamsulosin, 0.4 mg, oral, Nightly       PRN medications: acetaminophen, dextrose, dextrose, glucagon, glucagon, melatonin, polyethylene glycol     LABS and IMAGING     WBC   Date Value Ref Range Status   04/08/2024 8.4 4.4 - 11.3 x10*3/uL Final   04/07/2024 7.5 4.4 - 11.3 x10*3/uL Final   04/06/2024 6.8 4.4 - 11.3 x10*3/uL Final     Hemoglobin   Date Value Ref Range Status   04/08/2024 12.6 (L) 13.5 - 17.5 g/dL Final   04/07/2024 11.3 (L) 13.5 - 17.5 g/dL Final   04/06/2024 12.3 (L) 13.5 - 17.5 g/dL Final     Hematocrit   Date Value Ref Range Status   04/08/2024 35.8 (L) 41.0 - 52.0 % Final   04/07/2024 31.3 (L) 41.0 - 52.0 % Final   04/06/2024 33.5 (L) 41.0 - 52.0 % Final     Bicarbonate   Date Value Ref Range Status   04/08/2024 24 21 - 32 mmol/L Final   04/07/2024 26 21 - 32 mmol/L Final   04/06/2024 22 21 - 32 mmol/L Final     Creatinine   Date Value Ref Range Status   04/08/2024 0.66 0.50 - 1.30 mg/dL Final   04/07/2024 0.70 0.50 - 1.30 mg/dL Final   04/06/2024 0.76 0.50 - 1.30 mg/dL Final     Calcium   Date Value Ref Range Status   04/08/2024 8.8 8.6 - 10.6 mg/dL Final   04/07/2024 8.3 (L) 8.6 - 10.6 mg/dL Final   04/06/2024 8.5 (L) 8.6 - 10.6 mg/dL Final       MR brain w and wo IV contrast  Narrative: Interpreted By:  Gail Lorenzo  and Oziel Eaton   STUDY:  MR BRAIN W AND WO IV CONTRAST;  4/4/2024 7:11 pm      INDICATION:  Signs/Symptoms:hx of pituitary adenoma with AMS.      COMPARISON:  CT head dated 10/06/2023  MRI brain dated 05/03/2022      ACCESSION NUMBER(S):  [id number]      ORDERING CLINICIAN:  POP HUSTON      TECHNIQUE:  Axial T2,  FLAIR, DWI, gradient echo T2 and sagittal and coronal T1  weighted images of brain were acquired. Post contrast T1 weighted  images were acquired after administration of 16 mL Dotarem gadolinium  based intravenous contrast.      High resolution coronal and sagittal T1 and T2  weighted MR images of  the sella were acquired. High resolution post contrast axial and  sagittal T1 weighted MR images of the sella were obtained. Dynamic  post contrast imaging was also performed.      FINDINGS:  Compared to the prior MRI brain dated 05/23/2022 there is similar  appearance of the enhancing sellar mass with T2/FLAIR hyperintense  signal of contrast enhancement, measuring approximately 3.0 x 2.5 x  2.3 cm (series 21, image 50 and series 19, image 69). There is  similar appearance of the more focal lobulated portion superiorly  which abuts and possibly partially encases the infundibulum with  rightward displacement (series 16, image 16). The lesion again is  noted extending from the sella into the suprasellar cistern with  superior displacement of the optic chiasm (series 16, image 14),  similar to prior imaging. There is extension of the mass into the  left greater than right sphenoid sinuses similar to the prior,  abutting the floor of the sphenoid sinus and scalloping the superior  margin of the clivus. This appears slightly progressed in size since  prior imaging measuring up to 2.7 cm (AP oblique), previously 2.3 cm.  There is similar greater than 180 degree encasement of the left  cavernous sinus (series 16, image 13) with abutment of the cavernous  segment of the intracranial internal carotid artery which remains  grossly patent.      No diffusion restriction abnormality to suggest acute infarction.  Mild scattered nonspecific T2 and FLAIR hyperintensities in the  subcortical and periventricular white matter which are nonspecific  however can be seen with chronic small vessel ischemic change. No  acute intracranial  hemorrhage. No new foci of abnormal enhancement.      There is moderate prominence of the ventricles and sulci diffusely  likely reflecting component of generalized parenchymal volume loss.  Similar to the prior MRI. No abnormal extra-axial fluid collections.      Extension of the sellar mass into the sphenoid sinuses as described  above. The visualized paranasal sinuses and bilateral mastoid air  cells are otherwise unremarkable.      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 There is slight interval progression of the  exophytic portion which extends into the sphenoid sinus as described  otherwise similar appearance of mass effect on the optic chiasm and  pituitary infundibulum. Similar involvement of the right cavernous  sinus extending along the cavernous ICA without significant stenosis.      Moderate generalized parenchymal volume loss and mild chronic small  vessel ischemic changes.      I personally reviewed the images/study and I agree with the findings  as stated by resident physician Dr. José Miguel Ludwig.      MACRO:  None      Signed by: Gail Lorenzo 4/5/2024 12:14 AM  Dictation workstation:   [id number]       ASSESSMENT / PLANS      #AMS  #hx of pituitary adenoma (diagnosed 2012)  :: could be d/t progressive undiagnosed dementia  :: MRI of brain unchanged from prior  - falls precautions  - PT/OT consulted  - per neurosurgery consult note dated 5/4/22: pituitary adenoma diagnosed by Dr. Bueno; pt declined surgical intervention at that time and was lost to follow up  -Called POA but unable to reach --multiple attempts to contact niece were unsuccessful     #T2DM (uncontrolled)  - HgA1c 12.3  - accucheck ACHS  - Fasting BG this ; Increase lantus to 13 units tonight; mild SSI with meals  - hypoglycemia order set placed  - carb controlled vegetarian diet     #HTN  #Hypotension  #Dehydration  -BP improved with IVF bolus yesterday; will continue  to hold home Norvasc and Lisinopril; encourage PO intake     #HLD  - continue home dose of Lipitor 40 mg PO at bedtime        DVT Prophylaxis: lovenox 40 q 24hr - Estimated Creatinine Clearance: 76.1 mL/min (by C-G formula based on SCr of 0.77 mg/dL).     Code status: Full Code  Diet: Adult diet Carb Controlled; 60 gram carb/meal, 30 gram Carb evening snack; Vegetarian      Disposition: medically stable for discharge, await placement    Kem Allen MD

## 2024-04-08 NOTE — PROGRESS NOTES
Transitional Care Coordination Progress Note:  Patient discussed during interdisciplinary rounds.   Team members present: MD, TCC  Plan per Medical/Surgical team: altered mental status  Payor: [employer]  Discharge disposition: new SNF  Potential Barriers: family decision  ADOD: 1-2 days  Spoke with odalysyasmine Lopez to discuss accepting facilities. John asked that this TCC email her list of accepting SNFs so that she can review with patients POA. List emailed to John per her request at [email or web address]. Discussed with hasmukh that patient was medically ready for discharge and decision would need made regarding facility choice so that precert could be initiated. Hasmukh verbalized understanding. Will continue to monitor for discharge planning needs.     Charlee RODRIGUEZN, RN  Transitional Care Coordinator (TCC)  [phone]   05-Mar-2023 01:22

## 2024-04-09 LAB
ALBUMIN SERPL BCP-MCNC: 3.9 G/DL (ref 3.4–5)
ANION GAP SERPL CALC-SCNC: 12 MMOL/L (ref 10–20)
BASOPHILS # BLD AUTO: 0.03 X10*3/UL (ref 0–0.1)
BASOPHILS NFR BLD AUTO: 0.4 %
BUN SERPL-MCNC: 10 MG/DL (ref 6–23)
CALCIUM SERPL-MCNC: 9 MG/DL (ref 8.6–10.6)
CHLORIDE SERPL-SCNC: 105 MMOL/L (ref 98–107)
CO2 SERPL-SCNC: 25 MMOL/L (ref 21–32)
CREAT SERPL-MCNC: 0.84 MG/DL (ref 0.5–1.3)
EGFRCR SERPLBLD CKD-EPI 2021: 87 ML/MIN/1.73M*2
EOSINOPHIL # BLD AUTO: 0.19 X10*3/UL (ref 0–0.4)
EOSINOPHIL NFR BLD AUTO: 2.5 %
ERYTHROCYTE [DISTWIDTH] IN BLOOD BY AUTOMATED COUNT: 13.4 % (ref 11.5–14.5)
GLUCOSE BLD MANUAL STRIP-MCNC: 179 MG/DL (ref 74–99)
GLUCOSE BLD MANUAL STRIP-MCNC: 251 MG/DL (ref 74–99)
GLUCOSE BLD MANUAL STRIP-MCNC: 267 MG/DL (ref 74–99)
GLUCOSE BLD MANUAL STRIP-MCNC: 281 MG/DL (ref 74–99)
GLUCOSE SERPL-MCNC: 255 MG/DL (ref 74–99)
HCT VFR BLD AUTO: 36.8 % (ref 41–52)
HGB BLD-MCNC: 12.5 G/DL (ref 13.5–17.5)
IMM GRANULOCYTES # BLD AUTO: 0.04 X10*3/UL (ref 0–0.5)
IMM GRANULOCYTES NFR BLD AUTO: 0.5 % (ref 0–0.9)
LYMPHOCYTES # BLD AUTO: 2.84 X10*3/UL (ref 0.8–3)
LYMPHOCYTES NFR BLD AUTO: 37.4 %
MAGNESIUM SERPL-MCNC: 1.98 MG/DL (ref 1.6–2.4)
MCH RBC QN AUTO: 26.8 PG (ref 26–34)
MCHC RBC AUTO-ENTMCNC: 34 G/DL (ref 32–36)
MCV RBC AUTO: 79 FL (ref 80–100)
MONOCYTES # BLD AUTO: 0.63 X10*3/UL (ref 0.05–0.8)
MONOCYTES NFR BLD AUTO: 8.3 %
NEUTROPHILS # BLD AUTO: 3.87 X10*3/UL (ref 1.6–5.5)
NEUTROPHILS NFR BLD AUTO: 50.9 %
NRBC BLD-RTO: 0 /100 WBCS (ref 0–0)
PHOSPHATE SERPL-MCNC: 3.8 MG/DL (ref 2.5–4.9)
PLATELET # BLD AUTO: 250 X10*3/UL (ref 150–450)
POTASSIUM SERPL-SCNC: 4 MMOL/L (ref 3.5–5.3)
RBC # BLD AUTO: 4.66 X10*6/UL (ref 4.5–5.9)
SODIUM SERPL-SCNC: 138 MMOL/L (ref 136–145)
WBC # BLD AUTO: 7.6 X10*3/UL (ref 4.4–11.3)

## 2024-04-09 PROCEDURE — 82947 ASSAY GLUCOSE BLOOD QUANT: CPT | Mod: 59

## 2024-04-09 PROCEDURE — 2500000001 HC RX 250 WO HCPCS SELF ADMINISTERED DRUGS (ALT 637 FOR MEDICARE OP): Performed by: STUDENT IN AN ORGANIZED HEALTH CARE EDUCATION/TRAINING PROGRAM

## 2024-04-09 PROCEDURE — 36415 COLL VENOUS BLD VENIPUNCTURE: CPT | Performed by: STUDENT IN AN ORGANIZED HEALTH CARE EDUCATION/TRAINING PROGRAM

## 2024-04-09 PROCEDURE — 2500000002 HC RX 250 W HCPCS SELF ADMINISTERED DRUGS (ALT 637 FOR MEDICARE OP, ALT 636 FOR OP/ED): Performed by: NURSE PRACTITIONER

## 2024-04-09 PROCEDURE — 97165 OT EVAL LOW COMPLEX 30 MIN: CPT | Mod: GO | Performed by: OCCUPATIONAL THERAPIST

## 2024-04-09 PROCEDURE — 99232 SBSQ HOSP IP/OBS MODERATE 35: CPT | Performed by: STUDENT IN AN ORGANIZED HEALTH CARE EDUCATION/TRAINING PROGRAM

## 2024-04-09 PROCEDURE — G0378 HOSPITAL OBSERVATION PER HR: HCPCS

## 2024-04-09 PROCEDURE — 97530 THERAPEUTIC ACTIVITIES: CPT | Mod: GP

## 2024-04-09 PROCEDURE — 97535 SELF CARE MNGMENT TRAINING: CPT | Mod: GO | Performed by: OCCUPATIONAL THERAPIST

## 2024-04-09 PROCEDURE — 80069 RENAL FUNCTION PANEL: CPT | Performed by: STUDENT IN AN ORGANIZED HEALTH CARE EDUCATION/TRAINING PROGRAM

## 2024-04-09 PROCEDURE — 83735 ASSAY OF MAGNESIUM: CPT | Performed by: STUDENT IN AN ORGANIZED HEALTH CARE EDUCATION/TRAINING PROGRAM

## 2024-04-09 PROCEDURE — 2500000004 HC RX 250 GENERAL PHARMACY W/ HCPCS (ALT 636 FOR OP/ED): Performed by: NURSE PRACTITIONER

## 2024-04-09 PROCEDURE — 85025 COMPLETE CBC W/AUTO DIFF WBC: CPT | Performed by: STUDENT IN AN ORGANIZED HEALTH CARE EDUCATION/TRAINING PROGRAM

## 2024-04-09 PROCEDURE — 2500000001 HC RX 250 WO HCPCS SELF ADMINISTERED DRUGS (ALT 637 FOR MEDICARE OP): Performed by: NURSE PRACTITIONER

## 2024-04-09 PROCEDURE — 96372 THER/PROPH/DIAG INJ SC/IM: CPT | Performed by: NURSE PRACTITIONER

## 2024-04-09 RX ADMIN — CYANOCOBALAMIN TAB 1000 MCG 1000 MCG: 1000 TAB at 08:47

## 2024-04-09 RX ADMIN — INSULIN LISPRO 6 UNITS: 100 INJECTION, SOLUTION INTRAVENOUS; SUBCUTANEOUS at 17:09

## 2024-04-09 RX ADMIN — ATORVASTATIN CALCIUM 40 MG: 40 TABLET, FILM COATED ORAL at 21:07

## 2024-04-09 RX ADMIN — ENOXAPARIN SODIUM 40 MG: 100 INJECTION SUBCUTANEOUS at 21:07

## 2024-04-09 RX ADMIN — INSULIN GLARGINE 13 UNITS: 100 INJECTION, SOLUTION SUBCUTANEOUS at 21:10

## 2024-04-09 RX ADMIN — Medication 2000 UNITS: at 08:47

## 2024-04-09 RX ADMIN — TAMSULOSIN HYDROCHLORIDE 0.4 MG: 0.4 CAPSULE ORAL at 21:07

## 2024-04-09 RX ADMIN — INSULIN LISPRO 2 UNITS: 100 INJECTION, SOLUTION INTRAVENOUS; SUBCUTANEOUS at 08:48

## 2024-04-09 RX ADMIN — INSULIN LISPRO 6 UNITS: 100 INJECTION, SOLUTION INTRAVENOUS; SUBCUTANEOUS at 13:34

## 2024-04-09 RX ADMIN — ARIPIPRAZOLE 2 MG: 2 TABLET ORAL at 08:47

## 2024-04-09 ASSESSMENT — ACTIVITIES OF DAILY LIVING (ADL)
HOME_MANAGEMENT_TIME_ENTRY: 11
ADL_ASSISTANCE: INDEPENDENT

## 2024-04-09 ASSESSMENT — COGNITIVE AND FUNCTIONAL STATUS - GENERAL
HELP NEEDED FOR BATHING: A LITTLE
DAILY ACTIVITIY SCORE: 18
MOBILITY SCORE: 17
STANDING UP FROM CHAIR USING ARMS: A LITTLE
DRESSING REGULAR LOWER BODY CLOTHING: A LITTLE
DRESSING REGULAR UPPER BODY CLOTHING: A LITTLE
CLIMB 3 TO 5 STEPS WITH RAILING: A LOT
TURNING FROM BACK TO SIDE WHILE IN FLAT BAD: A LITTLE
TOILETING: A LITTLE
EATING MEALS: A LITTLE
PERSONAL GROOMING: A LITTLE
MOVING TO AND FROM BED TO CHAIR: A LITTLE
WALKING IN HOSPITAL ROOM: A LITTLE
MOVING FROM LYING ON BACK TO SITTING ON SIDE OF FLAT BED WITH BEDRAILS: A LITTLE

## 2024-04-09 ASSESSMENT — PAIN SCALES - GENERAL
PAINLEVEL_OUTOF10: 0 - NO PAIN

## 2024-04-09 ASSESSMENT — PAIN - FUNCTIONAL ASSESSMENT
PAIN_FUNCTIONAL_ASSESSMENT: 0-10
PAIN_FUNCTIONAL_ASSESSMENT: 0-10

## 2024-04-09 NOTE — PROGRESS NOTES
INTERNAL MEDICINE PROGRESS NOTE     BRIEF NARRATIVE      Homero Stone is a 83 y.o. male on day 0 of admission presenting with Altered mental status.  Patient with past medical history of HLD, BPPV, pituitary adenoma (diagnosed 2012), T2DM, and HTN who presented to the emergency department after wellness check by police/EMS due to family concern for his wellbeing. Admitted for AMS, inability to care for self.     SUBJECTIVE     Patient seen and examined today, there is no acute event overnight.  Patient was sitting comfortably in bed in nonapparent distress    OBJECTIVE      Visit Vitals  /66   Pulse 76   Temp 36.5 °C (97.7 °F)   Resp 15      No intake or output data in the 24 hours ending 04/09/24 1250      Physical Exam   Constitutional:       General: He is not in acute distress.     Appearance: He is not ill-appearing or toxic-appearing.   HENT:      Head: Normocephalic and atraumatic.      Nose: Nose normal.      Mouth/Throat:      Mouth: Mucous membranes are moist.   Eyes:      Extraocular Movements: Extraocular movements intact.   Pulmonary:      Effort: Pulmonary effort is normal. No respiratory distress.   Abdominal:      General: There is no distension.   Musculoskeletal:         General: No swelling.      Cervical back: No rigidity.   Skin:     Coloration: Skin is not pale.   Neurological:      Mental Status: He is alert. He is disoriented.   Psychiatric:         Mood and Affect: Mood normal.         Cognition and Memory: Cognition is impaired. Memory is impaired.     Current Meds   [Held by provider] amLODIPine, 5 mg, oral, Daily  ARIPiprazole, 2 mg, oral, Daily  atorvastatin, 40 mg, oral,  Nightly  cholecalciferol, 2,000 Units, oral, Daily  cyanocobalamin, 1,000 mcg, oral, Daily  enoxaparin, 40 mg, subcutaneous, q24h  insulin glargine, 13 Units, subcutaneous, q24h  insulin lispro, 0-10 Units, subcutaneous, TID with meals  [Held by provider] lisinopril, 40 mg, oral, Daily  tamsulosin, 0.4 mg, oral, Nightly       PRN medications: acetaminophen, dextrose, dextrose, glucagon, glucagon, melatonin, polyethylene glycol     LABS and IMAGING     WBC   Date Value Ref Range Status   04/09/2024 7.6 4.4 - 11.3 x10*3/uL Final   04/08/2024 8.4 4.4 - 11.3 x10*3/uL Final   04/07/2024 7.5 4.4 - 11.3 x10*3/uL Final     Hemoglobin   Date Value Ref Range Status   04/09/2024 12.5 (L) 13.5 - 17.5 g/dL Final   04/08/2024 12.6 (L) 13.5 - 17.5 g/dL Final   04/07/2024 11.3 (L) 13.5 - 17.5 g/dL Final     Hematocrit   Date Value Ref Range Status   04/09/2024 36.8 (L) 41.0 - 52.0 % Final   04/08/2024 35.8 (L) 41.0 - 52.0 % Final   04/07/2024 31.3 (L) 41.0 - 52.0 % Final     Bicarbonate   Date Value Ref Range Status   04/09/2024 25 21 - 32 mmol/L Final   04/08/2024 24 21 - 32 mmol/L Final   04/07/2024 26 21 - 32 mmol/L Final     Creatinine   Date Value Ref Range Status   04/09/2024 0.84 0.50 - 1.30 mg/dL Final   04/08/2024 0.66 0.50 - 1.30 mg/dL Final   04/07/2024 0.70 0.50 - 1.30 mg/dL Final     Calcium   Date Value Ref Range Status   04/09/2024 9.0 8.6 - 10.6 mg/dL Final   04/08/2024 8.8 8.6 - 10.6 mg/dL Final   04/07/2024 8.3 (L) 8.6 - 10.6 mg/dL Final            ASSESSMENT / PLANS      #AMS  #hx of pituitary adenoma (diagnosed 2012)  :: could be d/t progressive undiagnosed dementia  :: MRI of brain unchanged from prior  - falls precautions  - PT/OT consulted  - per neurosurgery consult note dated 5/4/22: pituitary adenoma diagnosed by Dr. Bueno; pt declined surgical intervention at that time and was lost to follow up  -Called POA but unable to reach --multiple attempts to contact niece were unsuccessful     #T2DM  (uncontrolled)  - HgA1c 12.3  - accucheck ACHS  - Fasting BG this ; Increase lantus to 13 units tonight; mild SSI with meals  - hypoglycemia order set placed  - carb controlled vegetarian diet     #HTN  #Hypotension  #Dehydration  -BP improved with IVF bolus yesterday; will continue to hold home Norvasc and Lisinopril; encourage PO intake     #HLD  - continue home dose of Lipitor 40 mg PO at bedtime        DVT Prophylaxis: lovenox 40 q 24hr - Estimated Creatinine Clearance: 76.1 mL/min (by C-G formula based on SCr of 0.77 mg/dL).     Code status: Full Code  Diet: Adult diet Carb Controlled; 60 gram carb/meal, 30 gram Carb evening snack; Vegetarian      Disposition: medically stable for discharge, await placement    Kem Allen MD

## 2024-04-09 NOTE — PROGRESS NOTES
Physical Therapy    Physical Therapy Treatment    Patient Name: Homero Stone  MRN: [id number]  Today's Date: 4/9/2024  Time Calculation  Start Time: 1328  Stop Time: 1340  Time Calculation (min): 12 min       Assessment/Plan   PT Assessment  End of Session Communication: Bedside nurse  End of Session Patient Position: Up in chair, Alarm off, not on at start of session     PT Plan  Treatment/Interventions: Gait training, Balance training, Strengthening, Therapeutic activity  PT Plan: PT Eval only  PT Frequency: 3 times per week  PT Discharge Recommendations: Moderate intensity level of continued care (vs 24 hr assist/supervison.)  PT Recommended Transfer Status: Contact guard  PT - OK to Discharge: Yes      General Visit Information:   PT  Visit  PT Received On: 04/09/24  General  Family/Caregiver Present: No  Prior to Session Communication: Bedside nurse  Patient Position Received: Up in chair, Alarm off, not on at start of session  Preferred Learning Style: verbal  General Comment: Pt seated in chair upon entry. Pleasant and cooperative. Willing to work with PT. Pt wthout any new comments/complaints.    Subjective   Precautions:  Precautions  Medical Precautions: Fall precautions    Objective   Pain:  Pain Assessment  Pain Assessment: 0-10  Pain Score: 0 - No pain  Cognition:  Cognition  Overall Cognitive Status: Impaired  Orientation Level:  (Pleasantly confused. Required reorientation to hospital.)  Attention:  (Awake, alert and attentive.)  Insight: Mild  Postural Control:  Postural Control  Postural Control: Within Functional Limits  Static Sitting Balance  Static Sitting-Balance Support: Feet supported  Static Sitting-Level of Assistance: Independent  Static Standing Balance  Static Standing-Balance Support: No upper extremity supported  Static Standing-Level of Assistance: Contact guard    Activity Tolerance:  Activity Tolerance  Endurance: Decreased tolerance for upright  activites  Treatments:    Therapeutic Activity  Therapeutic Activity Performed: Yes  Therapeutic Activity 1: transfers, gait    Bed Mobility  Bed Mobility: No (Pt seated in chair pre/post visit.)    Ambulation/Gait Training  Ambulation/Gait Training Performed: Yes  Ambulation/Gait Training 1  Surface 1: Level tile  Device 1: No device  Assistance 1: Contact guard, Arm in arm assistance  Quality of Gait 1:  (Pt w mild complaints of weakness/fatigue. No acute LOB though at falls risk without assist. Pt with impaired safety awareness w impaired mentation.)  Comments/Distance (ft) 1: 75ft  Transfer 1  Transfer From 1: Sit to, Stand to  Transfer to 1: Stand, Sit  Transfer Level of Assistance 1: Contact guard    Outcome Measures:  [facility] Basic Mobility  Turning from your back to your side while in a flat bed without using bedrails: A little  Moving from lying on your back to sitting on the side of a flat bed without using bedrails: A little  Moving to and from bed to chair (including a wheelchair): A little  Standing up from a chair using your arms (e.g. wheelchair or bedside chair): A little  To walk in hospital room: A little  Climbing 3-5 steps with railing: A lot  Basic Mobility - Total Score: 17    Education Documentation  Precautions, taught by Romero Brantley PT at 4/9/2024  1:52 PM.  Learner: Patient  Readiness: Acceptance  Method: Explanation  Response: Needs Reinforcement    Mobility Training, taught by Romero Brantley PT at 4/9/2024  1:52 PM.  Learner: Patient  Readiness: Acceptance  Method: Explanation  Response: Needs Reinforcement    Education Comments  No comments found.      Encounter Problems       Encounter Problems (Active)       Mobility       STG - Patient will ambulate >200ft, least restrictive device, stand by assist       Start:  04/05/24    Expected End:  04/19/24               PT Transfers       STG - Patient to transfer to and from sit to supine independent       Start:  04/05/24    Expected End:   04/19/24            STG - Patient will transfer sit to and from stand independent       Start:  04/05/24    Expected End:  04/19/24               Pain - Adult

## 2024-04-09 NOTE — PROGRESS NOTES
Received email from hasmukh Lopez, facility of choice is [facility]. Facility updated. This TCC requested updated therapy notes for precert to be initiated.   Update 1411: Updated notes sent to [facility], direct submit team asked to submit for precert.   Charlee SAMUEL, RN  Transitional Care Coordinator (TCC)  [phone]

## 2024-04-09 NOTE — PROGRESS NOTES
"Occupational Therapy    Evaluation    Patient Name: Homero Stone  MRN: [id number]  Today's Date: 4/9/2024  Time Calculation  Start Time: 0954  Stop Time: 1025  Time Calculation (min): 31 min    Assessment  IP OT Assessment  OT Assessment: Pt presents with altered mental status per family. Pt pleasant, cooperative and able to follow commands however does demonstrate decreased awareness affecting self-care, potential medication management and financial management.  Attempted to complete cognitive assessment this date ([facility]) to aide in discharge planning and support needed to transition to safe environment for pt. However pt perseverating about \"his creator, Justyna and our reincarnation\"  Prognosis: Good  Barriers to Discharge: Decreased caregiver support (pt lives along and would benefit from 24 hour supervision)  Evaluation/Treatment Tolerance: Patient tolerated treatment well  Medical Staff Made Aware: Yes  End of Session Communication: Bedside nurse  End of Session Patient Position: Up in chair, Alarm off, not on at start of session  Plan:  Treatment Interventions: Cognitive reorientation, Patient/family training, Compensatory technique education, Continued evaluation  OT Frequency: 2 times per week  OT Discharge Recommendations: 24 hr supervision due to cognition, Other (Comment) (if family unable to provide 24 hr supervision; pt may benefit from assisted living setting)  OT Recommended Transfer Status: Stand by assist, Assist of 1  OT - OK to Discharge: Yes    Subjective   Current Problem:  1. Disorientation          General:  General  Reason for Referral: OT for ADL and safety assessment; pt admitted with AMS  Past Medical History Relevant to Rehab: 83 YOM presenting from home with concerns from family for AMS. PMH of HLD, BPPV, pituitary adenoma (diagnosed 2012), T2DM, and HTN.  Family/Caregiver Present: No  Prior to Session Communication: Bedside nurse  Patient Position Received: Up in chair, Alarm off, " "not on at start of session  Preferred Learning Style: verbal  General Comment: Pt sitting in chair looking out window. Easily engaged and pleasant. Educated on reason for evaluation.  Precautions:  Medical Precautions: Fall precautions  Precautions Comment: per chart review, pt has not history of falls however family/neighbors have witnessed pt unsteady at times  Vital Signs:     Pain:  Pain Assessment  Pain Assessment: 0-10  Pain Score: 0 - No pain    Objective   Cognition:  Overall Cognitive Status:  (Alert and oriented to self only)  Orientation Level: Disoriented to place, Disoriented to time, Disoriented to situation  Attention:  (impaired; pt did not follow conversation; religiously preoccupied at times)  Problem Solving:  (impaired)  Abstract Reasoning:  (impaired)  Insight:  (mild-moderate)     Confusion Assessment Method (CAM)  Acute Onset and Fluctuating Course (1A): Yes  Acute Onset and Fluctuating Course (1B): Yes  Inattention (2): Yes  Disorganized Thinking (3): Yes  Rate Patient's Level of Consciousness (4): Alert (Normal), No  Delirium Present: Yes  Davis Agitation Sedation Scale  Davis Agitation Sedation Scale (RASS): Alert and calm  Home Living:  Type of Home: House  Lives With: Alone  Home Adaptive Equipment: None  Home Layout: One level  Home Access: Stairs to enter with rails  Entrance Stairs-Number of Steps: 5  Bathroom Shower/Tub:  (pt states \"yes\")  Home Living Comments: pt did not identify any equipment in the home   Prior Function:  Level of Luce: Independent with ADLs and functional transfers  Receives Help From: Family  ADL Assistance: Independent (per chart review, pt performance declining recently)  Ambulatory Assistance: Independent  IADL History:     ADL:  Eating Assistance: Independent  Eating Deficit: Setup, Verbal cueing  Grooming Assistance: Independent  Grooming Deficit: Verbal cueing, Supervision/safety, Wash/dry hands, Wash/dry face  Bathing Assistance:  " (anticipate standby to min A based on pt mobility however, cues needed for follow through. Pt smells of urine during this assessment. Pt seems unaware and declined to complete more than basic hygiene at sink.)  Bathing Deficit:  (decreased awareness)  UE Dressing Assistance: Independent  LE Dressing Assistance: Independent  Toileting Assistance with Device: Independent  Functional Assistance: Modified independent  ADL Comments: Pt appears to be able to participate and complete ADL tasks however decreased awareness of quality of performance resulting in poor hygiene.  Activity Tolerance:  Endurance: Endurance does not limit participation in activity  Activity Tolerance Comments: WFL  Bed Mobility/Transfers: Bed Mobility  Bed Mobility: No (pt sitting up in chair at start and end of session)    Transfers  Transfer: Yes  Transfer 1  Transfer From 1: Sit to, Stand to  Transfer to 1: Stand, Sit  Technique 1: Sit to stand, Stand to sit  Transfer Level of Assistance 1: Close supervision, Contact guard  Trials/Comments 1: from chair; simulated toilet transfer      Functional Mobility:  Functional Mobility  Functional Mobility Performed: Yes  Functional Mobility 1  Surface 1: Level tile  Device 1: No device  Assistance 1: Close supervision  Quality of Functional Mobility 1:  (WFL)  Comments 1: pt ambulated in room, around end of bed, into bathroom, completed hygiene and returned to chair by window  Sitting Balance:  Static Sitting Balance  Static Sitting-Balance Support: No upper extremity supported, Feet supported (legs crossed)  Static Sitting-Level of Assistance: Independent  Dynamic Sitting Balance  Dynamic Sitting-Balance Support: Feet supported, No upper extremity supported  Dynamic Sitting-Balance: Lateral lean, Reaching for objects, Reaching across midline, Trunk control activities  Dynamic Sitting-Comments: independent  Standing Balance:  Static Standing Balance  Static Standing-Balance Support: No upper extremity  supported  Static Standing-Level of Assistance: Distant supervision  Dynamic Standing Balance  Dynamic Standing-Balance Support: No upper extremity supported  Dynamic Standing-Balance: Reaching for objects, Reaching across midline, Turning  Dynamic Standing-Comments: CGA/supervision  Modalities:  Modalities Used: No    Sensation:  Light Touch: No apparent deficits  Strength:  Strength Comments: MALATHI WFL overall  Perception:  Inattention/Neglect: Appears intact  Initiation: Appears intact  Motor Planning: Appears intact  Perseveration: Perseverates during conversation (Sikhism preoccupation)  Coordination:  Movements are Fluid and Coordinated: Yes  Finger to Nose: Intact   Hand Function:  Hand Function  Gross Grasp: Functional  Coordination: Functional  Extremities: RUE   RUE : Within Functional Limits and LUE   LUE: Within Functional Limits      Outcome Measures: [facility] Daily Activity  Putting on and taking off regular lower body clothing: A little  Bathing (including washing, rinsing, drying): A little  Putting on and taking off regular upper body clothing: A little  Toileting, which includes using toilet, bedpan or urinal: A little  Taking care of personal grooming such as brushing teeth: A little  Eating Meals: A little  Daily Activity - Total Score: 18      Education Documentation  ADL Training, taught by Beth Spann OT at 4/9/2024 10:48 AM.  Learner: Patient  Readiness: Acceptance  Method: Explanation  Response: Verbalizes Understanding, Needs Reinforcement      Goals:   Encounter Problems       Encounter Problems (Active)       COGNITION/SAFETY       Patient will complete at least one standardized cognitive assessment with min cues and within reasonable time frame to aide in discharge planning       Start:  04/09/24    Expected End:  04/23/24               COGNITION/SAFETY       orientation       Start:  04/09/24    Expected End:  04/23/24       Pt will increase orientation to person, place, time and  situation to 75% with no cues to improve safety and awareness for planning, financial and medication management.          Attention       Start:  04/09/24    Expected End:  04/23/24       Pt will remain on task for 5 minutes to complete ADL activity (donning/doffing socks, brushing teeth, etc) as evidenced by ability to complete task with no more than 1 verbal or tactile cue.  Demonstrate executive functioning skills by initiating occupation-based task with 1-2 verbal prompts

## 2024-04-10 VITALS
SYSTOLIC BLOOD PRESSURE: 117 MMHG | OXYGEN SATURATION: 99 % | BODY MASS INDEX: 28.58 KG/M2 | HEIGHT: 67 IN | TEMPERATURE: 97.7 F | WEIGHT: 182.1 LBS | DIASTOLIC BLOOD PRESSURE: 73 MMHG | RESPIRATION RATE: 18 BRPM | HEART RATE: 78 BPM

## 2024-04-10 LAB
GLUCOSE BLD MANUAL STRIP-MCNC: 168 MG/DL (ref 74–99)
GLUCOSE BLD MANUAL STRIP-MCNC: 193 MG/DL (ref 74–99)
GLUCOSE BLD MANUAL STRIP-MCNC: 200 MG/DL (ref 74–99)

## 2024-04-10 PROCEDURE — 99239 HOSP IP/OBS DSCHRG MGMT >30: CPT | Performed by: STUDENT IN AN ORGANIZED HEALTH CARE EDUCATION/TRAINING PROGRAM

## 2024-04-10 PROCEDURE — 2500000001 HC RX 250 WO HCPCS SELF ADMINISTERED DRUGS (ALT 637 FOR MEDICARE OP): Performed by: NURSE PRACTITIONER

## 2024-04-10 PROCEDURE — 82947 ASSAY GLUCOSE BLOOD QUANT: CPT | Mod: 91

## 2024-04-10 PROCEDURE — 2500000002 HC RX 250 W HCPCS SELF ADMINISTERED DRUGS (ALT 637 FOR MEDICARE OP, ALT 636 FOR OP/ED): Performed by: NURSE PRACTITIONER

## 2024-04-10 PROCEDURE — G0378 HOSPITAL OBSERVATION PER HR: HCPCS

## 2024-04-10 PROCEDURE — 2500000001 HC RX 250 WO HCPCS SELF ADMINISTERED DRUGS (ALT 637 FOR MEDICARE OP): Performed by: STUDENT IN AN ORGANIZED HEALTH CARE EDUCATION/TRAINING PROGRAM

## 2024-04-10 RX ADMIN — INSULIN LISPRO 2 UNITS: 100 INJECTION, SOLUTION INTRAVENOUS; SUBCUTANEOUS at 08:42

## 2024-04-10 RX ADMIN — INSULIN LISPRO 2 UNITS: 100 INJECTION, SOLUTION INTRAVENOUS; SUBCUTANEOUS at 17:39

## 2024-04-10 RX ADMIN — ARIPIPRAZOLE 2 MG: 2 TABLET ORAL at 08:42

## 2024-04-10 RX ADMIN — INSULIN LISPRO 2 UNITS: 100 INJECTION, SOLUTION INTRAVENOUS; SUBCUTANEOUS at 11:44

## 2024-04-10 RX ADMIN — Medication 2000 UNITS: at 08:41

## 2024-04-10 RX ADMIN — CYANOCOBALAMIN TAB 1000 MCG 1000 MCG: 1000 TAB at 08:42

## 2024-04-10 ASSESSMENT — COGNITIVE AND FUNCTIONAL STATUS - GENERAL
TOILETING: A LITTLE
MOBILITY SCORE: 23
PERSONAL GROOMING: A LITTLE
HELP NEEDED FOR BATHING: A LITTLE
CLIMB 3 TO 5 STEPS WITH RAILING: A LITTLE
DAILY ACTIVITIY SCORE: 20
DRESSING REGULAR LOWER BODY CLOTHING: A LITTLE

## 2024-04-10 ASSESSMENT — PAIN SCALES - GENERAL: PAINLEVEL_OUTOF10: 0 - NO PAIN

## 2024-04-10 NOTE — PROGRESS NOTES
04/10/24 1000   Discharge Planning   Home or Post Acute Services Post acute facilities (Rehab/SNF/etc)   Type of Post Acute Facility Services Skilled nursing   Patient expects to be discharged to: [facility]   Does the patient need discharge transport arranged? Yes   RoundTrip coordination needed? Yes   Has discharge transport been arranged? Yes   What day is the transport expected? 04/10/24   What time is the transport expected? 1600     Patient to discharge to [facility] today. Transport arranged via [facility] stretcher for 4pm. Patient, MD, Nieves RN aware. Messages left for THI Grace and hasmukh Lopez. Blue slip delivered to unit secretary.   Charlee RODRIGUEZN, RN  Transitional Care Coordinator (TCC)  [phone]

## 2024-04-10 NOTE — NURSING NOTE
Pts ivs removed and report called to [facility]. Pt discharged and left with [facility].

## 2024-04-10 NOTE — PROGRESS NOTES
DISCHARGE MEDICATION REVIEW BY PHARMACY     NAME:  Homero Stone   MRN:  [id number]   SERVICE DATE: 4/10/2024   SERVICE TIME:  9:10 AM       The following discharge medications were reviewed by a pharmacist: Yes  The following recommendations were made:  Meds have not been filled since 5/2023, pt going to snf. Meds were continued during admission.  Dispo: SNF       Medication List      CONTINUE taking these medications     amLODIPine 5 mg tablet; Commonly known as: Norvasc   ARIPiprazole 2 mg tablet; Commonly known as: Abilify   atorvastatin 40 mg tablet; Commonly known as: Lipitor   lisinopril 40 mg tablet   metFORMIN 1,000 mg tablet; Commonly known as: Glucophage   tamsulosin 0.4 mg 24 hr capsule; Commonly known as: Flomax       Anitra Verdin, PharmD, [facility]  Eneida Jordan 3 Pharmacist

## 2024-04-10 NOTE — CARE PLAN
Problem: Fall/Injury  Goal: Not fall by end of shift  Outcome: Progressing  Goal: Be free from injury by end of the shift  Outcome: Progressing  Goal: Verbalize understanding of personal risk factors for fall in the hospital  Outcome: Progressing  Goal: Verbalize understanding of risk factor reduction measures to prevent injury from fall in the home  Outcome: Progressing  Goal: Use assistive devices by end of the shift  Outcome: Progressing     Problem: Diabetes  Goal: Increase stability of blood glucose readings by end of shift  Outcome: Progressing     Problem: Pain - Adult  Goal: Verbalizes/displays adequate comfort level or baseline comfort level  Outcome: Progressing     Problem: Safety - Adult  Goal: Free from fall injury  Outcome: Progressing     Problem: Discharge Planning  Goal: Discharge to home or other facility with appropriate resources  Outcome: Progressing     Problem: Chronic Conditions and Co-morbidities  Goal: Patient's chronic conditions and co-morbidity symptoms are monitored and maintained or improved  Outcome: Progressing   The patient's goals for the shift include pt. glucose level will remain less than 300 by4.9.24    The clinical goals for the shift include pt will remain safe through shift

## 2024-04-10 NOTE — DISCHARGE SUMMARY
INTERNAL MEDICINE DISCHARGE SUMMARY             Discharge Diagnosis   Altered mental status    Issues Requiring Follow-Up   None     Test Results Pending At Discharge     Pending Labs       No current pending labs.          Hospital Course   Homero Stone is a 83 y.o. male on day 0 of admission presenting with Altered mental status.  Patient with past medical history of HLD, BPPV, pituitary adenoma (diagnosed 2012), T2DM, and HTN who presented to the emergency department after wellness check by police/EMS due to family concern for his wellbeing. Admitted for AMS, inability to care for self.  PT/OT evaluated patient and recommending SNF.  Patient has full capacity and is agreeable to SNF.  Speech evaluated patient and there was no risk of for aspiration.     Pertinent Physical Exam At Time of Discharge     Physical Exam  Constitutional:       General: He is not in acute distress.     Appearance: He is not ill-appearing or toxic-appearing.   Pulmonary:      Effort: Pulmonary effort is normal. No respiratory distress.   Abdominal:      General: There is no distension.   Musculoskeletal:         General: No swelling.      Cervical back: No rigidity.   Skin:     Coloration: Skin is not pale.   Neurological:      Mental Status: He is alert  Psychiatric:         Mood and Affect: Mood normal.         Cognition and Memory: Cognition is at baseline  Home Medications        Medication List      CONTINUE taking these medications     amLODIPine 5 mg tablet; Commonly known as: Norvasc   ARIPiprazole 2 mg tablet; Commonly known as: Abilify   atorvastatin 40 mg tablet; Commonly known as: Lipitor   lisinopril 40 mg tablet   metFORMIN 1,000 mg tablet; Commonly known as: Glucophage   tamsulosin 0.4 mg 24 hr capsule; Commonly known as: Flomax     Outpatient Follow-Up     No future appointments.    Kem Allen MD

## 2024-04-10 NOTE — CARE PLAN
Problem: Fall/Injury  Goal: Not fall by end of shift  Outcome: Progressing   The patient's goals for the shift include pt. glucose level will remain less than 300 by4.9.24    The clinical goals for the shift include pt. will remain safe and free from falls during hospitalization

## 2024-06-22 ENCOUNTER — APPOINTMENT (OUTPATIENT)
Dept: RADIOLOGY | Facility: HOSPITAL | Age: 84
End: 2024-06-22
Payer: COMMERCIAL

## 2024-06-22 ENCOUNTER — APPOINTMENT (OUTPATIENT)
Dept: CARDIOLOGY | Facility: HOSPITAL | Age: 84
End: 2024-06-22
Payer: COMMERCIAL

## 2024-06-22 ENCOUNTER — HOSPITAL ENCOUNTER (OUTPATIENT)
Facility: HOSPITAL | Age: 84
Setting detail: OBSERVATION
Discharge: NURSING, REHAB OR LONG-TERM CARE | End: 2024-06-27
Attending: EMERGENCY MEDICINE | Admitting: INTERNAL MEDICINE
Payer: COMMERCIAL

## 2024-06-22 DIAGNOSIS — E11.9 TYPE 2 DIABETES MELLITUS WITHOUT COMPLICATION, WITHOUT LONG-TERM CURRENT USE OF INSULIN (MULTI): ICD-10-CM

## 2024-06-22 DIAGNOSIS — F03.90 DEMENTIA WITHOUT BEHAVIORAL DISTURBANCE, PSYCHOTIC DISTURBANCE, MOOD DISTURBANCE, OR ANXIETY, UNSPECIFIED DEMENTIA SEVERITY, UNSPECIFIED DEMENTIA TYPE (MULTI): Primary | ICD-10-CM

## 2024-06-22 LAB
ALBUMIN SERPL BCP-MCNC: 4.1 G/DL (ref 3.4–5)
ALP SERPL-CCNC: 123 U/L (ref 33–136)
ALT SERPL W P-5'-P-CCNC: 30 U/L (ref 10–52)
ANION GAP BLDV CALCULATED.4IONS-SCNC: 13 MMOL/L (ref 10–25)
ANION GAP SERPL CALC-SCNC: 13 MMOL/L (ref 10–20)
APPEARANCE UR: CLEAR
AST SERPL W P-5'-P-CCNC: 34 U/L (ref 9–39)
BASE EXCESS BLDV CALC-SCNC: 2.9 MMOL/L (ref -2–3)
BASOPHILS # BLD AUTO: 0.04 X10*3/UL (ref 0–0.1)
BASOPHILS NFR BLD AUTO: 0.5 %
BILIRUB SERPL-MCNC: 0.4 MG/DL (ref 0–1.2)
BILIRUB UR STRIP.AUTO-MCNC: NEGATIVE MG/DL
BODY TEMPERATURE: 37 DEGREES CELSIUS
BUN SERPL-MCNC: 9 MG/DL (ref 6–23)
CA-I BLDV-SCNC: 1.23 MMOL/L (ref 1.1–1.33)
CALCIUM SERPL-MCNC: 8.7 MG/DL (ref 8.6–10.3)
CHLORIDE BLDV-SCNC: 101 MMOL/L (ref 98–107)
CHLORIDE SERPL-SCNC: 103 MMOL/L (ref 98–107)
CO2 SERPL-SCNC: 25 MMOL/L (ref 21–32)
COLOR UR: YELLOW
CREAT SERPL-MCNC: 1.2 MG/DL (ref 0.5–1.3)
EGFRCR SERPLBLD CKD-EPI 2021: 60 ML/MIN/1.73M*2
EOSINOPHIL # BLD AUTO: 0.17 X10*3/UL (ref 0–0.4)
EOSINOPHIL NFR BLD AUTO: 2.2 %
ERYTHROCYTE [DISTWIDTH] IN BLOOD BY AUTOMATED COUNT: 13.8 % (ref 11.5–14.5)
GLUCOSE BLD MANUAL STRIP-MCNC: 253 MG/DL (ref 74–99)
GLUCOSE BLD MANUAL STRIP-MCNC: 369 MG/DL (ref 74–99)
GLUCOSE BLDV-MCNC: 352 MG/DL (ref 74–99)
GLUCOSE SERPL-MCNC: 351 MG/DL (ref 74–99)
GLUCOSE UR STRIP.AUTO-MCNC: ABNORMAL MG/DL
HCO3 BLDV-SCNC: 28.5 MMOL/L (ref 22–26)
HCT VFR BLD AUTO: 36 % (ref 41–52)
HCT VFR BLD EST: 35 % (ref 41–52)
HGB BLD-MCNC: 12.6 G/DL (ref 13.5–17.5)
HGB BLDV-MCNC: 11.6 G/DL (ref 13.5–17.5)
IMM GRANULOCYTES # BLD AUTO: 0.05 X10*3/UL (ref 0–0.5)
IMM GRANULOCYTES NFR BLD AUTO: 0.6 % (ref 0–0.9)
INHALED O2 CONCENTRATION: 21 %
KETONES UR STRIP.AUTO-MCNC: NEGATIVE MG/DL
LACTATE BLDV-SCNC: 2 MMOL/L (ref 0.4–2)
LEUKOCYTE ESTERASE UR QL STRIP.AUTO: NEGATIVE
LYMPHOCYTES # BLD AUTO: 2.65 X10*3/UL (ref 0.8–3)
LYMPHOCYTES NFR BLD AUTO: 33.8 %
MCH RBC QN AUTO: 27.3 PG (ref 26–34)
MCHC RBC AUTO-ENTMCNC: 35 G/DL (ref 32–36)
MCV RBC AUTO: 78 FL (ref 80–100)
MONOCYTES # BLD AUTO: 0.59 X10*3/UL (ref 0.05–0.8)
MONOCYTES NFR BLD AUTO: 7.5 %
NEUTROPHILS # BLD AUTO: 4.35 X10*3/UL (ref 1.6–5.5)
NEUTROPHILS NFR BLD AUTO: 55.4 %
NITRITE UR QL STRIP.AUTO: NEGATIVE
NRBC BLD-RTO: 0 /100 WBCS (ref 0–0)
OXYHGB MFR BLDV: 60.4 % (ref 45–75)
PCO2 BLDV: 47 MM HG (ref 41–51)
PH BLDV: 7.39 PH (ref 7.33–7.43)
PH UR STRIP.AUTO: 8 [PH]
PLATELET # BLD AUTO: 252 X10*3/UL (ref 150–450)
PO2 BLDV: 36 MM HG (ref 35–45)
POTASSIUM BLDV-SCNC: 5 MMOL/L (ref 3.5–5.3)
POTASSIUM SERPL-SCNC: 5.3 MMOL/L (ref 3.5–5.3)
PROT SERPL-MCNC: 6.3 G/DL (ref 6.4–8.2)
PROT UR STRIP.AUTO-MCNC: NEGATIVE MG/DL
RBC # BLD AUTO: 4.62 X10*6/UL (ref 4.5–5.9)
RBC # UR STRIP.AUTO: NEGATIVE /UL
SAO2 % BLDV: 61 % (ref 45–75)
SODIUM BLDV-SCNC: 137 MMOL/L (ref 136–145)
SODIUM SERPL-SCNC: 136 MMOL/L (ref 136–145)
SP GR UR STRIP.AUTO: 1.02
UROBILINOGEN UR STRIP.AUTO-MCNC: NORMAL MG/DL
WBC # BLD AUTO: 7.9 X10*3/UL (ref 4.4–11.3)

## 2024-06-22 PROCEDURE — 99285 EMERGENCY DEPT VISIT HI MDM: CPT | Mod: 25

## 2024-06-22 PROCEDURE — 71045 X-RAY EXAM CHEST 1 VIEW: CPT

## 2024-06-22 PROCEDURE — 36415 COLL VENOUS BLD VENIPUNCTURE: CPT

## 2024-06-22 PROCEDURE — G0378 HOSPITAL OBSERVATION PER HR: HCPCS

## 2024-06-22 PROCEDURE — 82435 ASSAY OF BLOOD CHLORIDE: CPT

## 2024-06-22 PROCEDURE — 85025 COMPLETE CBC W/AUTO DIFF WBC: CPT

## 2024-06-22 PROCEDURE — 71045 X-RAY EXAM CHEST 1 VIEW: CPT | Performed by: RADIOLOGY

## 2024-06-22 PROCEDURE — 84132 ASSAY OF SERUM POTASSIUM: CPT

## 2024-06-22 PROCEDURE — 82947 ASSAY GLUCOSE BLOOD QUANT: CPT

## 2024-06-22 PROCEDURE — 82947 ASSAY GLUCOSE BLOOD QUANT: CPT | Mod: 91

## 2024-06-22 PROCEDURE — 2500000004 HC RX 250 GENERAL PHARMACY W/ HCPCS (ALT 636 FOR OP/ED)

## 2024-06-22 PROCEDURE — 81003 URINALYSIS AUTO W/O SCOPE: CPT

## 2024-06-22 PROCEDURE — 96360 HYDRATION IV INFUSION INIT: CPT | Mod: 59

## 2024-06-22 PROCEDURE — 93005 ELECTROCARDIOGRAM TRACING: CPT

## 2024-06-22 ASSESSMENT — ACTIVITIES OF DAILY LIVING (ADL)
PATIENT'S MEMORY ADEQUATE TO SAFELY COMPLETE DAILY ACTIVITIES?: NO
JUDGMENT_ADEQUATE_SAFELY_COMPLETE_DAILY_ACTIVITIES: NO
DRESSING YOURSELF: NEEDS ASSISTANCE
GROOMING: NEEDS ASSISTANCE
TOILETING: DEPENDENT
BATHING: DEPENDENT
HEARING - RIGHT EAR: FUNCTIONAL
ADEQUATE_TO_COMPLETE_ADL: YES
HEARING - LEFT EAR: FUNCTIONAL
WALKS IN HOME: NEEDS ASSISTANCE
FEEDING YOURSELF: NEEDS ASSISTANCE

## 2024-06-22 ASSESSMENT — COGNITIVE AND FUNCTIONAL STATUS - GENERAL
WALKING IN HOSPITAL ROOM: A LITTLE
PERSONAL GROOMING: A LOT
DRESSING REGULAR UPPER BODY CLOTHING: A LITTLE
CLIMB 3 TO 5 STEPS WITH RAILING: A LITTLE
TOILETING: A LOT
STANDING UP FROM CHAIR USING ARMS: A LITTLE
MOBILITY SCORE: 18
MOVING FROM LYING ON BACK TO SITTING ON SIDE OF FLAT BED WITH BEDRAILS: A LITTLE
TURNING FROM BACK TO SIDE WHILE IN FLAT BAD: A LITTLE
DRESSING REGULAR LOWER BODY CLOTHING: A LITTLE
PATIENT BASELINE BEDBOUND: NO
DAILY ACTIVITIY SCORE: 15
HELP NEEDED FOR BATHING: A LITTLE
MOVING TO AND FROM BED TO CHAIR: A LITTLE
EATING MEALS: A LOT

## 2024-06-22 ASSESSMENT — COLUMBIA-SUICIDE SEVERITY RATING SCALE - C-SSRS
2. HAVE YOU ACTUALLY HAD ANY THOUGHTS OF KILLING YOURSELF?: NO
1. IN THE PAST MONTH, HAVE YOU WISHED YOU WERE DEAD OR WISHED YOU COULD GO TO SLEEP AND NOT WAKE UP?: NO
6. HAVE YOU EVER DONE ANYTHING, STARTED TO DO ANYTHING, OR PREPARED TO DO ANYTHING TO END YOUR LIFE?: NO

## 2024-06-22 ASSESSMENT — PAIN - FUNCTIONAL ASSESSMENT
PAIN_FUNCTIONAL_ASSESSMENT: 0-10
PAIN_FUNCTIONAL_ASSESSMENT: 0-10

## 2024-06-22 ASSESSMENT — PAIN SCALES - GENERAL
PAINLEVEL_OUTOF10: 0 - NO PAIN
PAINLEVEL_OUTOF10: 0 - NO PAIN

## 2024-06-22 NOTE — ED PROVIDER NOTES
CC: Altered Mental Status     HPI:  Homero Stone is a 83 y.o. male with PMHx dementia, HLD, BPPV, pituitary adenoma (diagnosed 2012), T2DM on insulin, and htn who was brought in by family members for increased confusion.  They report he is AOx 1-2 at baseline, and was released from a rehab facility 5 days ago.  He is currently staying with family, and family is attempting to get him accepted to [facility], however family does not have resources to take care of him full-time at home.  Over the last day, family members have noticed that he has been more confused.  Patient is incontinent at baseline but they have noticed him voiding in inappropriate places, which is not usual behavior for him.    Family reports that he has been off of his insulin for a few days, as he was not sent home from his rehab facility with any prescriptions.  While patient was at the rehab facility, family was asked if they wanted him to stay long-term.  Per family, they had been told that there were no problems with his memory, so at that time they thought that they could take care of him at home but now have concerns.    Limitations to History:  Dementia/mental status  Additional History provided by:  Great-niece    External Records Reviewed:  Recent available ED and inpatient notes reviewed in EMR.    PMHx/PSHx:  Per HPI.   - has a past medical history of BPPV (benign paroxysmal positional vertigo), Diabetes mellitus (Multi), HLD (hyperlipidemia), Hypertension, Pituitary adenoma (Multi), and Prostate cancer (Multi).  - has a past surgical history that includes CT angio neck (3/2/2017) and CT angio head w and wo IV contrast (3/2/2017).    Medications:  Reviewed in EMR. See EMR for complete list of medications and doses.    Allergies:  Penicillins, Lidocaine, Naproxen, Nsaids (non-steroidal anti-inflammatory drug), and Quinolones    Social History:  - Tobacco:  reports that he has quit smoking. His smoking use included cigarettes. He  has never used smokeless tobacco.   - Alcohol:  reports that he does not currently use alcohol.   - Illicit Drugs:  reports no history of drug use.     ROS:  Per HPI.     ???????????????????????????????????????????????????????????????  Triage Vitals:  T 36.6 °C (97.9 °F)  HR 85  /74  RR 18  O2 97 % None (Room air)    Physical exam:   Triage vitals reviewed.  Constitutional: Elderly adult in no acute distress, non toxic in appearance  Head: Normocephalic, atraumatic  Skin: Intact, dry. No rashes or lesions.  Eyes: Pupils are equal, reactive to light bilaterally. EOMI without nystagmus. No conjunctival injection.  Neck: Supple. Trachea is midline.  No neck stiffness.  Pulmonary: Normal work of breathing with no accessory muscle use noted.  Clear to auscultation bilaterally.   Cardiovascular: Normal rate, regular rhythm.  No murmurs/gallops/rubs appreciated. 2+ radial and DP pulses bilaterally.   Abdomen: Soft, nondistended. Nontender to palpation.  No guarding or rebound.  No CVA tenderness  Extremities: No gross deformities.  Moving all extremities spontaneously without difficulty.  Extremities are warm and well-perfused.  Neuro: Awake and alert.  Oriented to self and location, not to time.  Answers some questions appropriately, however does not have adequate insight into his ability to care for self.  Speech is clear. Face is symmetric without facial droop and facial sensation to light touch equal bilaterally. Uvula midline. Tongue protrusion midline. Full and equal shoulder shrug. 5/5 motor strength of UEs and LEs. Sensation to light touch intact in all four extremities. No pronator drift.  Gait is slow but with no abnormalities.  Psych: Appropriate mood and affect.    ???????????????????????????????????????????????????????????????  ED Course:  ED Course as of 06/22/24 1858   Sat Jun 22, 2024   1721 XR chest 1 view  CXR reviewed without evidence of PTX, consolidation, or widened mediastinum. [HH]   7820  ECG 12 lead  I independently interpreted the EKG (EM resident): 83 bpm, normal sinus rhythm. Left axis deviation. , QTc 451, QRS 76. No ST elevations, depressions, or T wave inversions concerning for ischemia. When compared with prior EKG from 10/6/2023, there is no significant change. [HH]      ED Course User Index  [HH] Kat Segundo MD         Diagnoses as of 06/22/24 1858   Dementia without behavioral disturbance, psychotic disturbance, mood disturbance, or anxiety, unspecified dementia severity, unspecified dementia type (Multi)       EKG & Images:  Independently reviewed, See ED Course      MDM:  Homero Stone is a 83 y.o. male with PMHx dementia, HLD, BPPV, pituitary adenoma (diagnosed 2012), T2DM on insulin, and htn who was brought in by family members for increased confusion.  On arrival, patient was afebrile and hemodynamically stable.  He is confused, oriented to self and place only.  On exam, cardiac and pulmonary exams are benign, abdomen is soft and nontender, and patient has no rashes or wounds.  Patient was hyperglycemic to 369 on arrival.  1 L bolus LR started.    Differential diagnosis includes hyperglycemia, DKA, dementia, metabolic encephalopathy.  EKG is nonischemic and stable from previous, and CXR showed no acute abnormalities.  CBC showed no leukocytosis, so lower concern for infection.  Mild anemia, however I would not expect this to be symptomatic.  VBG showed no acidosis, so low concern for DKA; his hyperglycemia is likely secondary to not taking his insulin for the last several days.  No clinically significant electrolyte abnormalities on CMP.  UA pending at time of signout.    Given his history, I anticipate the patient will require admission for placement.  Patient was admitted to internal medicine service in stable condition.    Final diagnoses:   [F03.90] Dementia without behavioral disturbance, psychotic disturbance, mood disturbance, or anxiety, unspecified dementia  severity, unspecified dementia type (Multi)       Patient seen and staffed with Dr. Ruth    Social Determinants Limiting Care:  Dementia, housing insecurity    Disposition:  Admit to floor    Kat Segundo MD   Emergency Medicine PGY1  [facility]     Disclaimer: This note was dictated by speech recognition. Minor errors in transcription may be present     ? SmartLinks last updated 6/22/2024 6:47 PM        Kat Segundo MD  Resident  06/22/24 [id number]

## 2024-06-22 NOTE — ED TRIAGE NOTES
PT WAS BROUGHT TO ED BY HIS GREAT NIECE FOR INCREASED CONFUSION AND STATES THAT THE PT IS NOT HIS USUAL BASELINE as HE WAS RECENTLY DIAGNOSED WITH EARLY DEMENTIA. PT WAS RECENTLY RELEASED FROM A SKILLED CARE FACILITY. PT STATES THAT HE FEELS FINE BUT FAMILY STATES THAT HE IS SLOWER THAN USUAL. PT IS A DIABETIC  IN TRIAGE.

## 2024-06-23 LAB
ANION GAP SERPL CALC-SCNC: 13 MMOL/L (ref 10–20)
BUN SERPL-MCNC: 8 MG/DL (ref 6–23)
CALCIUM SERPL-MCNC: 8.6 MG/DL (ref 8.6–10.3)
CHLORIDE SERPL-SCNC: 102 MMOL/L (ref 98–107)
CO2 SERPL-SCNC: 25 MMOL/L (ref 21–32)
CREAT SERPL-MCNC: 0.85 MG/DL (ref 0.5–1.3)
EGFRCR SERPLBLD CKD-EPI 2021: 87 ML/MIN/1.73M*2
ERYTHROCYTE [DISTWIDTH] IN BLOOD BY AUTOMATED COUNT: 13.8 % (ref 11.5–14.5)
GLUCOSE BLD MANUAL STRIP-MCNC: 192 MG/DL (ref 74–99)
GLUCOSE BLD MANUAL STRIP-MCNC: 236 MG/DL (ref 74–99)
GLUCOSE BLD MANUAL STRIP-MCNC: 282 MG/DL (ref 74–99)
GLUCOSE BLD MANUAL STRIP-MCNC: 313 MG/DL (ref 74–99)
GLUCOSE SERPL-MCNC: 299 MG/DL (ref 74–99)
HCT VFR BLD AUTO: 37.7 % (ref 41–52)
HGB BLD-MCNC: 13.1 G/DL (ref 13.5–17.5)
MCH RBC QN AUTO: 27.2 PG (ref 26–34)
MCHC RBC AUTO-ENTMCNC: 34.7 G/DL (ref 32–36)
MCV RBC AUTO: 78 FL (ref 80–100)
NRBC BLD-RTO: 0 /100 WBCS (ref 0–0)
PLATELET # BLD AUTO: 273 X10*3/UL (ref 150–450)
POTASSIUM SERPL-SCNC: 3.9 MMOL/L (ref 3.5–5.3)
RBC # BLD AUTO: 4.81 X10*6/UL (ref 4.5–5.9)
SODIUM SERPL-SCNC: 136 MMOL/L (ref 136–145)
WBC # BLD AUTO: 8 X10*3/UL (ref 4.4–11.3)

## 2024-06-23 PROCEDURE — 96372 THER/PROPH/DIAG INJ SC/IM: CPT | Performed by: INTERNAL MEDICINE

## 2024-06-23 PROCEDURE — 97165 OT EVAL LOW COMPLEX 30 MIN: CPT | Mod: GO

## 2024-06-23 PROCEDURE — 80048 BASIC METABOLIC PNL TOTAL CA: CPT | Performed by: INTERNAL MEDICINE

## 2024-06-23 PROCEDURE — 2500000002 HC RX 250 W HCPCS SELF ADMINISTERED DRUGS (ALT 637 FOR MEDICARE OP, ALT 636 FOR OP/ED): Performed by: INTERNAL MEDICINE

## 2024-06-23 PROCEDURE — 2500000001 HC RX 250 WO HCPCS SELF ADMINISTERED DRUGS (ALT 637 FOR MEDICARE OP): Performed by: INTERNAL MEDICINE

## 2024-06-23 PROCEDURE — 85027 COMPLETE CBC AUTOMATED: CPT | Performed by: INTERNAL MEDICINE

## 2024-06-23 PROCEDURE — 2500000004 HC RX 250 GENERAL PHARMACY W/ HCPCS (ALT 636 FOR OP/ED): Performed by: INTERNAL MEDICINE

## 2024-06-23 PROCEDURE — 82947 ASSAY GLUCOSE BLOOD QUANT: CPT | Mod: 91

## 2024-06-23 PROCEDURE — G0378 HOSPITAL OBSERVATION PER HR: HCPCS

## 2024-06-23 PROCEDURE — 36415 COLL VENOUS BLD VENIPUNCTURE: CPT | Performed by: INTERNAL MEDICINE

## 2024-06-23 RX ORDER — INSULIN GLARGINE 100 [IU]/ML
10 INJECTION, SOLUTION SUBCUTANEOUS NIGHTLY
Status: DISCONTINUED | OUTPATIENT
Start: 2024-06-23 | End: 2024-06-27 | Stop reason: HOSPADM

## 2024-06-23 RX ORDER — LISINOPRIL 20 MG/1
40 TABLET ORAL DAILY
Status: DISCONTINUED | OUTPATIENT
Start: 2024-06-23 | End: 2024-06-27 | Stop reason: HOSPADM

## 2024-06-23 RX ORDER — ACETAMINOPHEN 650 MG/1
650 SUPPOSITORY RECTAL EVERY 4 HOURS PRN
Status: DISCONTINUED | OUTPATIENT
Start: 2024-06-23 | End: 2024-06-27 | Stop reason: HOSPADM

## 2024-06-23 RX ORDER — TAMSULOSIN HYDROCHLORIDE 0.4 MG/1
0.4 CAPSULE ORAL NIGHTLY
Status: DISCONTINUED | OUTPATIENT
Start: 2024-06-23 | End: 2024-06-27 | Stop reason: HOSPADM

## 2024-06-23 RX ORDER — AMLODIPINE BESYLATE 5 MG/1
5 TABLET ORAL DAILY
Status: DISCONTINUED | OUTPATIENT
Start: 2024-06-23 | End: 2024-06-27 | Stop reason: HOSPADM

## 2024-06-23 RX ORDER — INSULIN LISPRO 100 [IU]/ML
0-5 INJECTION, SOLUTION INTRAVENOUS; SUBCUTANEOUS
Status: DISCONTINUED | OUTPATIENT
Start: 2024-06-23 | End: 2024-06-27 | Stop reason: HOSPADM

## 2024-06-23 RX ORDER — METFORMIN HYDROCHLORIDE 500 MG/1
1000 TABLET ORAL
Status: DISCONTINUED | OUTPATIENT
Start: 2024-06-23 | End: 2024-06-27 | Stop reason: HOSPADM

## 2024-06-23 RX ORDER — DEXTROSE 50 % IN WATER (D50W) INTRAVENOUS SYRINGE
12.5
Status: DISCONTINUED | OUTPATIENT
Start: 2024-06-23 | End: 2024-06-27 | Stop reason: HOSPADM

## 2024-06-23 RX ORDER — ACETAMINOPHEN 160 MG/5ML
650 SOLUTION ORAL EVERY 4 HOURS PRN
Status: DISCONTINUED | OUTPATIENT
Start: 2024-06-23 | End: 2024-06-27 | Stop reason: HOSPADM

## 2024-06-23 RX ORDER — ATORVASTATIN CALCIUM 40 MG/1
40 TABLET, FILM COATED ORAL NIGHTLY
Status: DISCONTINUED | OUTPATIENT
Start: 2024-06-23 | End: 2024-06-27 | Stop reason: HOSPADM

## 2024-06-23 RX ORDER — ACETAMINOPHEN 325 MG/1
650 TABLET ORAL EVERY 4 HOURS PRN
Status: DISCONTINUED | OUTPATIENT
Start: 2024-06-23 | End: 2024-06-27 | Stop reason: HOSPADM

## 2024-06-23 RX ORDER — POLYETHYLENE GLYCOL 3350 17 G/17G
17 POWDER, FOR SOLUTION ORAL DAILY
Status: DISCONTINUED | OUTPATIENT
Start: 2024-06-23 | End: 2024-06-27 | Stop reason: HOSPADM

## 2024-06-23 RX ORDER — HEPARIN SODIUM 5000 [USP'U]/ML
5000 INJECTION, SOLUTION INTRAVENOUS; SUBCUTANEOUS EVERY 8 HOURS SCHEDULED
Status: DISCONTINUED | OUTPATIENT
Start: 2024-06-23 | End: 2024-06-27 | Stop reason: HOSPADM

## 2024-06-23 RX ORDER — DEXTROSE 50 % IN WATER (D50W) INTRAVENOUS SYRINGE
25
Status: DISCONTINUED | OUTPATIENT
Start: 2024-06-23 | End: 2024-06-27 | Stop reason: HOSPADM

## 2024-06-23 RX ORDER — ARIPIPRAZOLE 2 MG/1
2 TABLET ORAL DAILY
Status: DISCONTINUED | OUTPATIENT
Start: 2024-06-23 | End: 2024-06-27 | Stop reason: HOSPADM

## 2024-06-23 SDOH — SOCIAL STABILITY: SOCIAL INSECURITY: DOES ANYONE TRY TO KEEP YOU FROM HAVING/CONTACTING OTHER FRIENDS OR DOING THINGS OUTSIDE YOUR HOME?: NO

## 2024-06-23 SDOH — SOCIAL STABILITY: SOCIAL INSECURITY: ARE YOU OR HAVE YOU BEEN THREATENED OR ABUSED PHYSICALLY, EMOTIONALLY, OR SEXUALLY BY ANYONE?: NO

## 2024-06-23 SDOH — SOCIAL STABILITY: SOCIAL INSECURITY: ABUSE: ADULT

## 2024-06-23 SDOH — SOCIAL STABILITY: SOCIAL INSECURITY: HAVE YOU HAD ANY THOUGHTS OF HARMING ANYONE ELSE?: NO

## 2024-06-23 SDOH — SOCIAL STABILITY: SOCIAL INSECURITY: DO YOU FEEL ANYONE HAS EXPLOITED OR TAKEN ADVANTAGE OF YOU FINANCIALLY OR OF YOUR PERSONAL PROPERTY?: NO

## 2024-06-23 SDOH — SOCIAL STABILITY: SOCIAL INSECURITY: WERE YOU ABLE TO COMPLETE ALL THE BEHAVIORAL HEALTH SCREENINGS?: YES

## 2024-06-23 SDOH — SOCIAL STABILITY: SOCIAL INSECURITY: HAVE YOU HAD THOUGHTS OF HARMING ANYONE ELSE?: NO

## 2024-06-23 SDOH — SOCIAL STABILITY: SOCIAL INSECURITY: HAS ANYONE EVER THREATENED TO HURT YOUR FAMILY OR YOUR PETS?: NO

## 2024-06-23 SDOH — SOCIAL STABILITY: SOCIAL INSECURITY: ARE THERE ANY APPARENT SIGNS OF INJURIES/BEHAVIORS THAT COULD BE RELATED TO ABUSE/NEGLECT?: NO

## 2024-06-23 SDOH — SOCIAL STABILITY: SOCIAL INSECURITY: DO YOU FEEL UNSAFE GOING BACK TO THE PLACE WHERE YOU ARE LIVING?: NO

## 2024-06-23 ASSESSMENT — ENCOUNTER SYMPTOMS
CONSTITUTIONAL NEGATIVE: 1
CONFUSION: 1
CARDIOVASCULAR NEGATIVE: 1
NEUROLOGICAL NEGATIVE: 1
GASTROINTESTINAL NEGATIVE: 1
EYES NEGATIVE: 1
RESPIRATORY NEGATIVE: 1
MUSCULOSKELETAL NEGATIVE: 1

## 2024-06-23 ASSESSMENT — PAIN SCALES - PAIN ASSESSMENT IN ADVANCED DEMENTIA (PAINAD)
FACIALEXPRESSION: SMILING OR INEXPRESSIVE
BREATHING: NORMAL
TOTALSCORE: 0
TOTALSCORE: 0
FACIALEXPRESSION: SMILING OR INEXPRESSIVE
BREATHING: NORMAL
BODYLANGUAGE: RELAXED
BODYLANGUAGE: RELAXED
CONSOLABILITY: NO NEED TO CONSOLE
TOTALSCORE: 0
CONSOLABILITY: NO NEED TO CONSOLE
FACIALEXPRESSION: SMILING OR INEXPRESSIVE
CONSOLABILITY: NO NEED TO CONSOLE
BREATHING: NORMAL
BODYLANGUAGE: RELAXED

## 2024-06-23 ASSESSMENT — COGNITIVE AND FUNCTIONAL STATUS - GENERAL
PERSONAL GROOMING: A LITTLE
MOBILITY SCORE: 18
PERSONAL GROOMING: A LOT
EATING MEALS: A LITTLE
DRESSING REGULAR UPPER BODY CLOTHING: A LITTLE
MOVING TO AND FROM BED TO CHAIR: A LITTLE
DRESSING REGULAR LOWER BODY CLOTHING: A LITTLE
TOILETING: A LITTLE
CLIMB 3 TO 5 STEPS WITH RAILING: A LITTLE
HELP NEEDED FOR BATHING: A LITTLE
EATING MEALS: A LITTLE
DRESSING REGULAR UPPER BODY CLOTHING: A LITTLE
TURNING FROM BACK TO SIDE WHILE IN FLAT BAD: A LITTLE
MOBILITY SCORE: 18
MOVING FROM LYING ON BACK TO SITTING ON SIDE OF FLAT BED WITH BEDRAILS: A LITTLE
DAILY ACTIVITIY SCORE: 18
HELP NEEDED FOR BATHING: A LITTLE
TURNING FROM BACK TO SIDE WHILE IN FLAT BAD: A LITTLE
DRESSING REGULAR LOWER BODY CLOTHING: A LITTLE
DAILY ACTIVITIY SCORE: 18
PERSONAL GROOMING: A LITTLE
WALKING IN HOSPITAL ROOM: A LITTLE
EATING MEALS: A LITTLE
DAILY ACTIVITIY SCORE: 17
STANDING UP FROM CHAIR USING ARMS: A LITTLE
MOVING TO AND FROM BED TO CHAIR: A LITTLE
TOILETING: A LITTLE
HELP NEEDED FOR BATHING: A LITTLE
CLIMB 3 TO 5 STEPS WITH RAILING: A LITTLE
DRESSING REGULAR UPPER BODY CLOTHING: A LITTLE
TOILETING: A LITTLE
STANDING UP FROM CHAIR USING ARMS: A LITTLE
MOVING FROM LYING ON BACK TO SITTING ON SIDE OF FLAT BED WITH BEDRAILS: A LITTLE
WALKING IN HOSPITAL ROOM: A LITTLE
DRESSING REGULAR LOWER BODY CLOTHING: A LITTLE

## 2024-06-23 ASSESSMENT — ACTIVITIES OF DAILY LIVING (ADL)
ADL_ASSISTANCE: INDEPENDENT
BATHING_ASSISTANCE: STAND BY
LACK_OF_TRANSPORTATION: NO

## 2024-06-23 ASSESSMENT — LIFESTYLE VARIABLES
HOW OFTEN DO YOU HAVE A DRINK CONTAINING ALCOHOL: NEVER
AUDIT-C TOTAL SCORE: 0
SKIP TO QUESTIONS 9-10: 1
HOW OFTEN DO YOU HAVE 6 OR MORE DRINKS ON ONE OCCASION: NEVER
HOW MANY STANDARD DRINKS CONTAINING ALCOHOL DO YOU HAVE ON A TYPICAL DAY: PATIENT DOES NOT DRINK
AUDIT-C TOTAL SCORE: 0

## 2024-06-23 ASSESSMENT — PAIN - FUNCTIONAL ASSESSMENT
PAIN_FUNCTIONAL_ASSESSMENT: 0-10
PAIN_FUNCTIONAL_ASSESSMENT: PAINAD (PAIN ASSESSMENT IN ADVANCED DEMENTIA SCALE)
PAIN_FUNCTIONAL_ASSESSMENT: 0-10
PAIN_FUNCTIONAL_ASSESSMENT: PAINAD (PAIN ASSESSMENT IN ADVANCED DEMENTIA SCALE)

## 2024-06-23 ASSESSMENT — PAIN SCALES - GENERAL
PAINLEVEL_OUTOF10: 0 - NO PAIN

## 2024-06-23 NOTE — CARE PLAN
The patient's goals for the shift include safety     The clinical goals for the shift include free of falls by end of shift      Problem: Skin  Goal: Decreased wound size/increased tissue granulation at next dressing change  Outcome: Progressing     Problem: Skin  Goal: Participates in plan/prevention/treatment measures  Outcome: Progressing     Problem: Skin  Goal: Prevent/manage excess moisture  Outcome: Progressing     Problem: Skin  Goal: Promote/optimize nutrition  Outcome: Progressing     Problem: Fall/Injury  Goal: Not fall by end of shift  Outcome: Progressing

## 2024-06-23 NOTE — NURSING NOTE
Arrived to unit in stretcher from ED with JOE Riley at bedside. Patient scooted from stretcher to bed with minimal verbal coaching. Oriented to room, surroundings, use of the call light system. Patient provided with toiletries. Head to toe assessment completed, skin assessed by bedside nurse and charge RN. All needs met at this time. Call light within reach, bed alarm on. Patient safety maintained.

## 2024-06-23 NOTE — PROGRESS NOTES
Occupational Therapy    Evaluation    Patient Name: Homero Stone  MRN: [id number]  Today's Date: 6/23/2024  Time Calculation  Start Time: 1034  Stop Time: 1042  Time Calculation (min): 8 min        Assessment:  OT Assessment: Pt would benefit from 24/7 spv or LTC due to cogntion, however is moving around room without assistance and requires SPV/SBA for ADLs for cognitive aspect. Acute OT not needed at this time. recommending LTC or low intesity with 24/7 assist/ SPV at home.  Prognosis: Fair  Barriers to Discharge: Decreased caregiver support  Evaluation/Treatment Tolerance: Patient tolerated treatment well  Medical Staff Made Aware: Yes  End of Session Communication: Bedside nurse  End of Session Patient Position: Up in chair, Alarm off, not on at start of session (per RN- no need, pt up all AM, per RN- will keep an eye on him)  OT Assessment Results: Decreased safe judgment during ADL, Decreased cognition  Prognosis: Fair  Barriers to Discharge: Decreased caregiver support  Evaluation/Treatment Tolerance: Patient tolerated treatment well  Medical Staff Made Aware: Yes  Strengths: Support of Caregivers  Barriers to Participation: Comorbidities  Plan:  No Skilled OT: No acute OT goals identified  OT Frequency: OT eval only  OT Discharge Recommendations: No further acute OT, 24 hr supervision due to cognition  OT Recommended Transfer Status: Stand by assist, Assist of 1  OT - OK to Discharge: Yes       Subjective   Current Problem:  1. Dementia without behavioral disturbance, psychotic disturbance, mood disturbance, or anxiety, unspecified dementia severity, unspecified dementia type (Multi)          General:  General  Reason for Referral: Pt presented to ED for AMS/ dementia, with family requesting placement due to increased confusion  Referred By: Dr. Jon  Past Medical History Relevant to Rehab:   Past Medical History:   Diagnosis Date    BPPV (benign paroxysmal positional vertigo)     Diabetes mellitus  (Multi)     HLD (hyperlipidemia)     Hypertension     Pituitary adenoma (Multi)     Prostate cancer (Multi)        Prior to Session Communication: Bedside nurse  Patient Position Received: Bed, 3 rail up, Alarm off, not on at start of session  General Comment: plesantly confused, per RN has been up in room all AM  Precautions:  Medical Precautions: Fall precautions    Objective   Cognition:  Orientation Level: Disoriented to time, Disoriented to situation     Home Living:  Home Living Comments: Per chart, lives with family. Unable to obtain details due to cognition. Per chart, was in SNF for rehab until 2 weeks ago  Prior Function:  Level of [place]: Independent with ADLs and functional transfers  Receives Help From: Family  ADL Assistance: Independent  Homemaking Assistance: Independent  Ambulatory Assistance: Independent    ADL:  Eating Assistance: Independent  Grooming Assistance: Stand by  Grooming Deficit: Setup, Supervision/safety  Bathing Assistance: Stand by  Bathing Deficit: Setup, Supervision/safety  UE Dressing Assistance: Independent  LE Dressing Assistance: Stand by  LE Dressing Deficit: Setup, Supervision/safety  Toileting Assistance with Device: Stand by  Toileting Deficit: Setup, Supervison/safety  ADL Comments: able to complete ADLs with SPV/SBA for cognition and problem solving  Activity Tolerance:  Endurance: Endurance does not limit participation in activity  Bed Mobility/Transfers: Bed Mobility  Bed Mobility: Yes  Bed Mobility 1  Bed Mobility 1: Supine to sitting, Sitting to supine  Level of Assistance 1: Modified independent    Transfers  Transfer: Yes  Transfer 1  Transfer From 1: Bed to  Transfer to 1: Stand  Technique 1: Sit to stand, Stand to sit  Transfer Level of Assistance 1: Close supervision      Functional Mobility:  Functional Mobility  Functional Mobility Performed: Yes  Functional Mobility 1  Surface 1: Level tile  Device 1: No device  Assistance 1: Close supervision  Standing  Balance:  Static Standing Balance  Static Standing-Balance Support: No upper extremity supported  Static Standing-Level of Assistance: Close supervision  Dynamic Standing Balance  Dynamic Standing-Balance Support: No upper extremity supported  Dynamic Standing-Balance: Turning  Dynamic Standing-Comments: Close supervision     Coordination:  Movements are Fluid and Coordinated: Yes   Hand Function:  Gross Grasp: Functional  Coordination: Functional  Extremities: RUE   RUE : Within Functional Limits and LUE   LUE: Within Functional Limits  Outcome Measures:[facility] Daily Activity  Putting on and taking off regular lower body clothing: A little  Bathing (including washing, rinsing, drying): A little  Putting on and taking off regular upper body clothing: A little  Toileting, which includes using toilet, bedpan or urinal: A little  Taking care of personal grooming such as brushing teeth: A little  Eating Meals: A little  Daily Activity - Total Score: 18      Education Documentation  No documentation found.  Education Comments  No comments found.

## 2024-06-23 NOTE — PROGRESS NOTES
6//23/2024 10:27 AM  I spoke with Garrickgreg Wilbur. He said to call his aunt  John. I spoke with hasmukh Bowles who is DPOA.  Patient was at [facility] up until about two weeks ago and then discharged to stay with John. John would like patient to go to long term care due to dementia. She has applied for [employer] . Patient has [employer] dual. Patient should qualify for LTC under [place] [employer] or [facility] medicaid.  Hasmukh requests referrals to  [facility], [facility] and [facility]. I have requested to send referrals. Pebbles VELIZ

## 2024-06-23 NOTE — H&P
INTERNAL MEDICINE     HISTORY AND PHYSICAL      Chief Complaint:  Increased confusion    History Of Present Illness  Homero Stone is a 83 y.o. male presenting with increased confusion.  Patient has a history of dementia.  He was recently at a nursing home for rehab and was discharged to the care of his family about 2 weeks ago.  Family reports difficulty caring for him.  He also has not been taking his insulin for the past few days.  He has not had prescriptions for this.  Family is interested in long-term placement for the patient due to inability to be alone at home.  In the emergency room he was found to have some hyperglycemia.  Currently he is comfortably sitting up in no distress.  He denies headaches.     Past Medical History  He has a past medical history of BPPV (benign paroxysmal positional vertigo), Diabetes mellitus (Multi), HLD (hyperlipidemia), Hypertension, Pituitary adenoma (Multi), and Prostate cancer (Multi).    Surgical History  He has a past surgical history that includes CT angio neck (3/2/2017) and CT angio head w and wo IV contrast (3/2/2017).     Social History  He reports that he has quit smoking. His smoking use included cigarettes. He has never used smokeless tobacco. He reports that he does not currently use alcohol. He reports that he does not use drugs.    Family History  No family history on file.     Allergies  Penicillins, Lidocaine, Naproxen, Nsaids (non-steroidal anti-inflammatory drug), and Quinolones    Home Medications:  Prior to Admission medications    Medication Sig Start Date End Date Taking? Authorizing Provider   amLODIPine (Norvasc) 5 mg tablet Take 1 tablet (5 mg) by mouth once daily.    Historical Provider, MD   ARIPiprazole (Abilify) 2 mg tablet Take 1 tablet (2 mg) by mouth once daily.    Historical Provider, MD   atorvastatin (Lipitor) 40 mg tablet Take 1 tablet (40 mg) by mouth once daily at bedtime. 5/4/22   Historical Provider, MD   lisinopril 40 mg  "tablet Take 1 tablet (40 mg) by mouth once daily.    Historical Provider, MD   metFORMIN (Glucophage) 1,000 mg tablet Take 1 tablet (1,000 mg) by mouth 2 times daily (morning and late afternoon).    Historical Provider, MD   tamsulosin (Flomax) 0.4 mg 24 hr capsule Take 1 capsule (0.4 mg) by mouth once daily at bedtime. 7/14/14   Historical Provider, MD        Review of Systems   Constitutional: Negative.    HENT: Negative.     Eyes: Negative.    Respiratory: Negative.     Cardiovascular: Negative.    Gastrointestinal: Negative.    Musculoskeletal: Negative.    Skin: Negative.    Neurological: Negative.    Psychiatric/Behavioral:  Positive for confusion.         Last Recorded Vitals  Blood pressure 100/59, pulse 60, temperature 36.5 °C (97.7 °F), temperature source Oral, resp. rate 17, height 1.702 m (5' 7.01\"), weight 83.1 kg (183 lb 3.2 oz), SpO2 93%.    Physical Exam      Constitutional:       Appearance: Elderly, overweight, in no distress.  HENT:      Head: Normocephalic and atraumatic.   Eyes:      Extraocular Movements: Extraocular movements intact.      Pupils: Pupils are equal, round, and reactive to light.   Cardiovascular:      Rate and Rhythm: Normal rate and regular rhythm.      Pulses: Normal pulses.      Heart sounds: Normal heart sounds.   Pulmonary:      Effort: Pulmonary effort is normal.      Breath sounds: Normal breath sounds.   Abdominal:      General: Abdomen is flat. Bowel sounds are normal.      Palpations: Abdomen is soft.   Musculoskeletal:         General: Normal range of motion.      Cervical back: Normal range of motion and neck supple.   Skin:     General: Skin is warm and dry.   Neurological:      General: No focal deficit present.      Mental Status: Alert and oriented x 1-2, moves all extremities with normal power.     Relevant Results    Results for orders placed or performed during the hospital encounter of 06/22/24 (from the past 24 hour(s))   POCT GLUCOSE   Result Value Ref " Range    POCT Glucose 369 (H) 74 - 99 mg/dL   CBC and Auto Differential   Result Value Ref Range    WBC 7.9 4.4 - 11.3 x10*3/uL    nRBC 0.0 0.0 - 0.0 /100 WBCs    RBC 4.62 4.50 - 5.90 x10*6/uL    Hemoglobin 12.6 (L) 13.5 - 17.5 g/dL    Hematocrit 36.0 (L) 41.0 - 52.0 %    MCV 78 (L) 80 - 100 fL    MCH 27.3 26.0 - 34.0 pg    MCHC 35.0 32.0 - 36.0 g/dL    RDW 13.8 11.5 - 14.5 %    Platelets 252 150 - 450 x10*3/uL    Neutrophils % 55.4 40.0 - 80.0 %    Immature Granulocytes %, Automated 0.6 0.0 - 0.9 %    Lymphocytes % 33.8 13.0 - 44.0 %    Monocytes % 7.5 2.0 - 10.0 %    Eosinophils % 2.2 0.0 - 6.0 %    Basophils % 0.5 0.0 - 2.0 %    Neutrophils Absolute 4.35 1.60 - 5.50 x10*3/uL    Immature Granulocytes Absolute, Automated 0.05 0.00 - 0.50 x10*3/uL    Lymphocytes Absolute 2.65 0.80 - 3.00 x10*3/uL    Monocytes Absolute 0.59 0.05 - 0.80 x10*3/uL    Eosinophils Absolute 0.17 0.00 - 0.40 x10*3/uL    Basophils Absolute 0.04 0.00 - 0.10 x10*3/uL   Comprehensive metabolic panel   Result Value Ref Range    Glucose 351 (H) 74 - 99 mg/dL    Sodium 136 136 - 145 mmol/L    Potassium 5.3 3.5 - 5.3 mmol/L    Chloride 103 98 - 107 mmol/L    Bicarbonate 25 21 - 32 mmol/L    Anion Gap 13 10 - 20 mmol/L    Urea Nitrogen 9 6 - 23 mg/dL    Creatinine 1.20 0.50 - 1.30 mg/dL    eGFR 60 (L) >60 mL/min/1.73m*2    Calcium 8.7 8.6 - 10.3 mg/dL    Albumin 4.1 3.4 - 5.0 g/dL    Alkaline Phosphatase 123 33 - 136 U/L    Total Protein 6.3 (L) 6.4 - 8.2 g/dL    AST 34 9 - 39 U/L    Bilirubin, Total 0.4 0.0 - 1.2 mg/dL    ALT 30 10 - 52 U/L   BLOOD GAS VENOUS FULL PANEL   Result Value Ref Range    POCT pH, Venous 7.39 7.33 - 7.43 pH    POCT pCO2, Venous 47 41 - 51 mm Hg    POCT pO2, Venous 36 35 - 45 mm Hg    POCT SO2, Venous 61 45 - 75 %    POCT Oxy Hemoglobin, Venous 60.4 45.0 - 75.0 %    POCT Hematocrit Calculated, Venous 35.0 (L) 41.0 - 52.0 %    POCT Sodium, Venous 137 136 - 145 mmol/L    POCT Potassium, Venous 5.0 3.5 - 5.3 mmol/L    POCT  Chloride, Venous 101 98 - 107 mmol/L    POCT Ionized Calicum, Venous 1.23 1.10 - 1.33 mmol/L    POCT Glucose, Venous 352 (H) 74 - 99 mg/dL    POCT Lactate, Venous 2.0 0.4 - 2.0 mmol/L    POCT Base Excess, Venous 2.9 -2.0 - 3.0 mmol/L    POCT HCO3 Calculated, Venous 28.5 (H) 22.0 - 26.0 mmol/L    POCT Hemoglobin, Venous 11.6 (L) 13.5 - 17.5 g/dL    POCT Anion Gap, Venous 13.0 10.0 - 25.0 mmol/L    Patient Temperature 37.0 degrees Celsius    FiO2 21 %   POCT GLUCOSE   Result Value Ref Range    POCT Glucose 253 (H) 74 - 99 mg/dL   Urinalysis with Reflex Culture and Microscopic   Result Value Ref Range    Color, Urine Yellow Light-Yellow, Yellow, Dark-Yellow    Appearance, Urine Clear Clear    Specific Gravity, Urine 1.016 1.005 - 1.035    pH, Urine 8.0 5.0, 5.5, 6.0, 6.5, 7.0, 7.5, 8.0    Protein, Urine NEGATIVE NEGATIVE, 10 (TRACE), 20 (TRACE) mg/dL    Glucose, Urine OVER (4+) (A) Normal mg/dL    Blood, Urine NEGATIVE NEGATIVE    Ketones, Urine NEGATIVE NEGATIVE mg/dL    Bilirubin, Urine NEGATIVE NEGATIVE    Urobilinogen, Urine Normal Normal mg/dL    Nitrite, Urine NEGATIVE NEGATIVE    Leukocyte Esterase, Urine NEGATIVE NEGATIVE   POCT GLUCOSE   Result Value Ref Range    POCT Glucose 282 (H) 74 - 99 mg/dL   CBC   Result Value Ref Range    WBC 8.0 4.4 - 11.3 x10*3/uL    nRBC 0.0 0.0 - 0.0 /100 WBCs    RBC 4.81 4.50 - 5.90 x10*6/uL    Hemoglobin 13.1 (L) 13.5 - 17.5 g/dL    Hematocrit 37.7 (L) 41.0 - 52.0 %    MCV 78 (L) 80 - 100 fL    MCH 27.2 26.0 - 34.0 pg    MCHC 34.7 32.0 - 36.0 g/dL    RDW 13.8 11.5 - 14.5 %    Platelets 273 150 - 450 x10*3/uL   Basic metabolic panel   Result Value Ref Range    Glucose 299 (H) 74 - 99 mg/dL    Sodium 136 136 - 145 mmol/L    Potassium 3.9 3.5 - 5.3 mmol/L    Chloride 102 98 - 107 mmol/L    Bicarbonate 25 21 - 32 mmol/L    Anion Gap 13 10 - 20 mmol/L    Urea Nitrogen 8 6 - 23 mg/dL    Creatinine 0.85 0.50 - 1.30 mg/dL    eGFR 87 >60 mL/min/1.73m*2    Calcium 8.6 8.6 - 10.3 mg/dL            Principal Problem:    Altered mental status      Problem list:  Principal Problem:    Altered mental status      ASSESSMENT AND PLAN:    Confusion -likely secondary to progression of dementia.  Will correct electrolyte issues, mild encephalopathy superimposed on baseline dementia.  Hyperglycemia -restarted on insulin, monitor blood sugars.  Sliding scale coverage.  Hypertension -good control, same medications.  Hyperlipidemia -continue statin therapy.      Jeffery Jon MD  06/23/2411:03 AM

## 2024-06-23 NOTE — PROGRESS NOTES
6/23/2024 12:55 PM Patient has medicaid number  [id number]. Patient will need medicaid level of care. Pebbles CHAPIN

## 2024-06-24 LAB
ANION GAP SERPL CALC-SCNC: 14 MMOL/L (ref 10–20)
ATRIAL RATE: 83 BPM
BASOPHILS # BLD AUTO: 0.05 X10*3/UL (ref 0–0.1)
BASOPHILS NFR BLD AUTO: 0.5 %
BUN SERPL-MCNC: 9 MG/DL (ref 6–23)
CALCIUM SERPL-MCNC: 9.3 MG/DL (ref 8.6–10.3)
CHLORIDE SERPL-SCNC: 103 MMOL/L (ref 98–107)
CO2 SERPL-SCNC: 25 MMOL/L (ref 21–32)
CREAT SERPL-MCNC: 0.83 MG/DL (ref 0.5–1.3)
EGFRCR SERPLBLD CKD-EPI 2021: 87 ML/MIN/1.73M*2
EOSINOPHIL # BLD AUTO: 0.27 X10*3/UL (ref 0–0.4)
EOSINOPHIL NFR BLD AUTO: 2.7 %
ERYTHROCYTE [DISTWIDTH] IN BLOOD BY AUTOMATED COUNT: 13.8 % (ref 11.5–14.5)
GLUCOSE BLD MANUAL STRIP-MCNC: 192 MG/DL (ref 74–99)
GLUCOSE BLD MANUAL STRIP-MCNC: 217 MG/DL (ref 74–99)
GLUCOSE BLD MANUAL STRIP-MCNC: 237 MG/DL (ref 74–99)
GLUCOSE BLD MANUAL STRIP-MCNC: 252 MG/DL (ref 74–99)
GLUCOSE SERPL-MCNC: 171 MG/DL (ref 74–99)
HCT VFR BLD AUTO: 39.5 % (ref 41–52)
HGB BLD-MCNC: 13.9 G/DL (ref 13.5–17.5)
IMM GRANULOCYTES # BLD AUTO: 0.03 X10*3/UL (ref 0–0.5)
IMM GRANULOCYTES NFR BLD AUTO: 0.3 % (ref 0–0.9)
LYMPHOCYTES # BLD AUTO: 4.44 X10*3/UL (ref 0.8–3)
LYMPHOCYTES NFR BLD AUTO: 45.2 %
MCH RBC QN AUTO: 27.5 PG (ref 26–34)
MCHC RBC AUTO-ENTMCNC: 35.2 G/DL (ref 32–36)
MCV RBC AUTO: 78 FL (ref 80–100)
MONOCYTES # BLD AUTO: 0.65 X10*3/UL (ref 0.05–0.8)
MONOCYTES NFR BLD AUTO: 6.6 %
NEUTROPHILS # BLD AUTO: 4.38 X10*3/UL (ref 1.6–5.5)
NEUTROPHILS NFR BLD AUTO: 44.7 %
NRBC BLD-RTO: 0 /100 WBCS (ref 0–0)
P AXIS: 25 DEGREES
P OFFSET: 182 MS
P ONSET: 150 MS
PLATELET # BLD AUTO: 281 X10*3/UL (ref 150–450)
POTASSIUM SERPL-SCNC: 4 MMOL/L (ref 3.5–5.3)
PR INTERVAL: 126 MS
Q ONSET: 213 MS
QRS COUNT: 14 BEATS
QRS DURATION: 76 MS
QT INTERVAL: 384 MS
QTC CALCULATION(BAZETT): 451 MS
QTC FREDERICIA: 428 MS
R AXIS: -32 DEGREES
RBC # BLD AUTO: 5.06 X10*6/UL (ref 4.5–5.9)
SODIUM SERPL-SCNC: 138 MMOL/L (ref 136–145)
T AXIS: 59 DEGREES
T OFFSET: 405 MS
VENTRICULAR RATE: 83 BPM
WBC # BLD AUTO: 9.8 X10*3/UL (ref 4.4–11.3)

## 2024-06-24 PROCEDURE — G0378 HOSPITAL OBSERVATION PER HR: HCPCS

## 2024-06-24 PROCEDURE — 80048 BASIC METABOLIC PNL TOTAL CA: CPT | Performed by: INTERNAL MEDICINE

## 2024-06-24 PROCEDURE — 2500000004 HC RX 250 GENERAL PHARMACY W/ HCPCS (ALT 636 FOR OP/ED): Performed by: INTERNAL MEDICINE

## 2024-06-24 PROCEDURE — 36415 COLL VENOUS BLD VENIPUNCTURE: CPT | Performed by: INTERNAL MEDICINE

## 2024-06-24 PROCEDURE — 97161 PT EVAL LOW COMPLEX 20 MIN: CPT | Mod: GP

## 2024-06-24 PROCEDURE — 2500000002 HC RX 250 W HCPCS SELF ADMINISTERED DRUGS (ALT 637 FOR MEDICARE OP, ALT 636 FOR OP/ED): Performed by: INTERNAL MEDICINE

## 2024-06-24 PROCEDURE — 85025 COMPLETE CBC W/AUTO DIFF WBC: CPT | Performed by: INTERNAL MEDICINE

## 2024-06-24 PROCEDURE — 96372 THER/PROPH/DIAG INJ SC/IM: CPT | Performed by: INTERNAL MEDICINE

## 2024-06-24 PROCEDURE — 2500000001 HC RX 250 WO HCPCS SELF ADMINISTERED DRUGS (ALT 637 FOR MEDICARE OP): Performed by: INTERNAL MEDICINE

## 2024-06-24 PROCEDURE — 82947 ASSAY GLUCOSE BLOOD QUANT: CPT

## 2024-06-24 ASSESSMENT — COGNITIVE AND FUNCTIONAL STATUS - GENERAL
HELP NEEDED FOR BATHING: A LITTLE
STANDING UP FROM CHAIR USING ARMS: A LITTLE
DRESSING REGULAR UPPER BODY CLOTHING: A LITTLE
MOBILITY SCORE: 21
WALKING IN HOSPITAL ROOM: A LITTLE
PERSONAL GROOMING: A LITTLE
MOBILITY SCORE: 23
DAILY ACTIVITIY SCORE: 19
DRESSING REGULAR LOWER BODY CLOTHING: A LITTLE
TOILETING: A LITTLE
CLIMB 3 TO 5 STEPS WITH RAILING: A LITTLE
CLIMB 3 TO 5 STEPS WITH RAILING: A LITTLE

## 2024-06-24 ASSESSMENT — PAIN - FUNCTIONAL ASSESSMENT
PAIN_FUNCTIONAL_ASSESSMENT: 0-10
PAIN_FUNCTIONAL_ASSESSMENT: 0-10

## 2024-06-24 ASSESSMENT — ACTIVITIES OF DAILY LIVING (ADL): ADL_ASSISTANCE: INDEPENDENT

## 2024-06-24 ASSESSMENT — PAIN SCALES - GENERAL
PAINLEVEL_OUTOF10: 0 - NO PAIN
PAINLEVEL_OUTOF10: 0 - NO PAIN

## 2024-06-24 NOTE — PROGRESS NOTES
INTERNAL MEDICINE PROGRESS NOTE      HPI:    Patient lying in bed in no acute distress.  She remains pleasant but confused.    Vital signs in last 24 hours:  Temp:  [36.3 °C (97.3 °F)-36.6 °C (97.8 °F)] 36.3 °C (97.4 °F)  Heart Rate:  [65-88] 70  Resp:  [16] 16  BP: (107-147)/(69-76) 110/69    Physical Examination:  Physical Exam    Constitutional:       Appearance: Elderly, overweight, in no distress.  HENT:      Head: Normocephalic and atraumatic.   Eyes:      Extraocular Movements: Extraocular movements intact.      Pupils: Pupils are equal, round, and reactive to light.   Cardiovascular:      Rate and Rhythm: Normal rate and regular rhythm.      Pulses: Normal pulses.      Heart sounds: Normal heart sounds.   Pulmonary:      Effort: Pulmonary effort is normal.      Breath sounds: Normal breath sounds.   Abdominal:      General: Abdomen is flat. Bowel sounds are normal.      Palpations: Abdomen is soft.   Musculoskeletal:         General: Normal range of motion.      Cervical back: Normal range of motion and neck supple.   Skin:     General: Skin is warm and dry.   Neurological:      General: No focal deficit present.      Mental Status: Alert and oriented x 1-2, moves all extremities with normal power.    Medications:    Current Facility-Administered Medications:     acetaminophen (Tylenol) tablet 650 mg, 650 mg, oral, q4h PRN **OR** acetaminophen (Tylenol) oral liquid 650 mg, 650 mg, oral, q4h PRN **OR** acetaminophen (Tylenol) suppository 650 mg, 650 mg, rectal, q4h PRN, Jeffery Jon MD    amLODIPine (Norvasc) tablet 5 mg, 5 mg, oral, Daily, Jeffery Jon MD, 5 mg at 06/24/24 0827    ARIPiprazole (Abilify) tablet 2 mg, 2 mg, oral, Daily, Jeffery Jon MD, 2 mg at 06/24/24 0827    atorvastatin (Lipitor) tablet 40 mg, 40 mg, oral, Nightly, Jeffery Jon MD, 40 mg at 06/23/24 2056    dextrose 50 % injection 12.5 g, 12.5 g, intravenous, q15 min PRN, Jeffery Jon MD    dextrose 50 %  injection 25 g, 25 g, intravenous, q15 min PRN, Jeffery Jon MD    glucagon (Glucagen) injection 1 mg, 1 mg, intramuscular, q15 min PRN, Jeffery Jon MD    glucagon (Glucagen) injection 1 mg, 1 mg, intramuscular, q15 min PRN, Jeffery Jon MD    heparin (porcine) injection 5,000 Units, 5,000 Units, subcutaneous, q8h MELINDA, Jeffery Jon MD, 5,000 Units at 06/24/24 0609    insulin glargine (Lantus) injection 10 Units, 10 Units, subcutaneous, Nightly, Jeffery Jon MD, 10 Units at 06/23/24 2054    insulin lispro (HumaLOG) injection 0-5 Units, 0-5 Units, subcutaneous, TID, Jeffery Jon MD, 3 Units at 06/24/24 1154    lisinopril tablet 40 mg, 40 mg, oral, Daily, Jeffery Jon MD, 40 mg at 06/24/24 0827    metFORMIN (Glucophage) tablet 1,000 mg, 1,000 mg, oral, BID, Jeffery Jon MD, 1,000 mg at 06/24/24 0827    polyethylene glycol (Glycolax, Miralax) packet 17 g, 17 g, oral, Daily, Jeffery Jon MD, 17 g at 06/24/24 0825    tamsulosin (Flomax) 24 hr capsule 0.4 mg, 0.4 mg, oral, Nightly, Jeffery Jon MD, 0.4 mg at 06/23/24 2056    Laboratory Findings:  Lab Results   Component Value Date    WBC 9.8 06/24/2024    HGB 13.9 06/24/2024    HCT 39.5 (L) 06/24/2024    MCV 78 (L) 06/24/2024     06/24/2024     Lab Results   Component Value Date    INR 1.0 04/04/2024    PROTIME 10.7 04/04/2024     Lab Results   Component Value Date    GLUCOSE 171 (H) 06/24/2024    CALCIUM 9.3 06/24/2024     06/24/2024    K 4.0 06/24/2024    CO2 25 06/24/2024     06/24/2024    BUN 9 06/24/2024    CREATININE 0.83 06/24/2024       Assessment and Plan:     Confusion -progression of dementia, supportive measures.  Hyperglycemia -improved with reinstitution of home regimen of medications.  Hypertension -good control, same medications.  Hyperlipidemia -continue statin therapy.     Medically ready for discharge.      Jeffery Jon MD  06/24/24  12:36 PM

## 2024-06-24 NOTE — PROGRESS NOTES
Physical Therapy    Physical Therapy Evaluation    Patient Name: Homero Stone  MRN: [id number]  Today's Date: 6/24/2024   Time Calculation  Start Time: 0857  Stop Time: 0905  Time Calculation (min): 8 min    Assessment/Plan   PT Assessment  Evaluation/Treatment Tolerance: Patient tolerated treatment well  Medical Staff Made Aware: Yes  End of Session Communication: Bedside nurse  Assessment Comment: Pt presenting with increased confusion and family requesting facility placement. Pt with dementia at baseline. Pt demonstrates independence with all mobility with no acute therapy needs noted (SUP only due to cognitive deficits). PT Eval only completed and orders discharged. Please reorder if change in status.  End of Session Patient Position: Up in chair, Alarm off, not on at start of session (per RN- no need, pt up all AM, per RN- will keep an eye on him)  IP OR SWING BED PT PLAN  Inpatient or Swing Bed: Inpatient  PT Plan  PT Plan: PT Eval only  PT Eval Only Reason: No acute PT needs identified  PT Frequency: PT eval only  PT Discharge Recommendations: 24 hr supervision due to cognition, No further acute PT, No PT needed after discharge  Equipment Recommended upon Discharge:  (No needs anticipated)  PT Recommended Transfer Status: Independent  PT - OK to Discharge: Yes      Subjective   General Visit Information:  General  Reason for Referral: Pt is a 84 yo male who presented to ED for AMS/ dementia, with family requesting placement due to increased confusion  Referred By: Dr. Jon  Past Medical History Relevant to Rehab:   Past Medical History:   Diagnosis Date    BPPV (benign paroxysmal positional vertigo)     Diabetes mellitus (Multi)     HLD (hyperlipidemia)     Hypertension     Pituitary adenoma (Multi)     Prostate cancer (Multi)        Prior to Session Communication: Bedside nurse  Patient Position Received:  (pt seated in couch in room (Nursing staff aware. Pt up ad aj in room))  General Comment:  "pleasantly confused, per RN has been up in room all AM  Home Living:  Home Living  Home Living Comments: Per chart, lives with family. Unable to obtain details due to cognition. Per chart, was in SNF for rehab until 2 weeks ago. Pt stating does not have to navigate stairs at home  Prior Level of Function:  Prior Function Per Pt/Caregiver Report  Level of [place]: Independent with ADLs and functional transfers  Receives Help From: Family  ADL Assistance: Independent  Homemaking Assistance: Needs assistance (Pt reports sometimes helps family with cooking)  Ambulatory Assistance: Independent (no device. Pt states sometimes uses SPC)  Precautions:  Precautions  Medical Precautions: Fall precautions    Objective   Pain:  Pain Assessment  Pain Assessment: 0-10  0-10 (Numeric) Pain Score: 0 - No pain  Cognition:  Cognition  Overall Cognitive Status: Impaired at baseline (dementia at baseline)  Orientation Level: Disoriented to time (Pt stating \"June or July 2023\")  Memory: Exceptions to WFL  Safety/Judgement: Exceptions to WFL  Insight: Moderate  Impulsive: Mildly    General Assessments:  Activity Tolerance  Endurance: Endurance does not limit participation in activity    Sensation  Light Touch: No apparent deficits    Coordination  Movements are Fluid and Coordinated: Yes    Static Sitting Balance  Static Sitting-Balance Support: No upper extremity supported, Feet supported  Static Sitting-Level of Assistance: Independent    Static Standing Balance  Static Standing-Balance Support: No upper extremity supported  Static Standing-Level of Assistance: Independent  Functional Assessments:  Bed Mobility  Bed Mobility: Yes  Bed Mobility 1  Bed Mobility 1: Supine to sitting, Sitting to supine  Level of Assistance 1: Modified independent    Transfers  Transfer: Yes  Transfer 1  Transfer From 1: Bed to  Transfer to 1: Stand  Technique 1: Sit to stand, Stand to sit  Transfer Level of Assistance 1: Distant supervision, Modified " independent (SUP only due to cognitive deficits)    Ambulation/Gait Training  Ambulation/Gait Training Performed: Yes  Ambulation/Gait Training 1  Surface 1: Level tile  Device 1: No device  Assistance 1: Distant supervision, Modified independent (SUP only due to cognitive deficits)  Quality of Gait 1:  (initially reduced gait step length however improved throughout trial)  Comments/Distance (ft) 1: 200  Extremity/Trunk Assessments:  RLE   RLE : Within Functional Limits  LLE   LLE : Within Functional Limits  Outcome Measures:  [facility] Basic Mobility  Turning from your back to your side while in a flat bed without using bedrails: None  Moving from lying on your back to sitting on the side of a flat bed without using bedrails: None  Moving to and from bed to chair (including a wheelchair): None  Standing up from a chair using your arms (e.g. wheelchair or bedside chair): None  To walk in hospital room: None  Climbing 3-5 steps with railing: A little  Basic Mobility - Total Score: 23        Education Documentation  Body Mechanics, taught by Jamar Zavala, PT at 6/24/2024 10:25 AM.  Learner: Patient  Readiness: Acceptance  Method: Explanation  Response: Needs Reinforcement    Precautions, taught by Jamar Zavala, PT at 6/24/2024 10:25 AM.  Learner: Patient  Readiness: Acceptance  Method: Explanation  Response: Needs Reinforcement    Mobility Training, taught by Jamar Zavala, PT at 6/24/2024 10:25 AM.  Learner: Patient  Readiness: Acceptance  Method: Explanation  Response: Needs Reinforcement    Education Comments  No comments found.

## 2024-06-24 NOTE — PROGRESS NOTES
6/24/2024 11:35 AM Yahaira said FOC is [facility]. I have requested a medicaid level of care. Pebbles CHAPIN

## 2024-06-25 LAB
ANION GAP SERPL CALC-SCNC: 11 MMOL/L (ref 10–20)
BASOPHILS # BLD AUTO: 0.05 X10*3/UL (ref 0–0.1)
BASOPHILS NFR BLD AUTO: 0.6 %
BUN SERPL-MCNC: 11 MG/DL (ref 6–23)
CALCIUM SERPL-MCNC: 8.7 MG/DL (ref 8.6–10.3)
CHLORIDE SERPL-SCNC: 104 MMOL/L (ref 98–107)
CO2 SERPL-SCNC: 26 MMOL/L (ref 21–32)
CREAT SERPL-MCNC: 0.92 MG/DL (ref 0.5–1.3)
EGFRCR SERPLBLD CKD-EPI 2021: 83 ML/MIN/1.73M*2
EOSINOPHIL # BLD AUTO: 0.17 X10*3/UL (ref 0–0.4)
EOSINOPHIL NFR BLD AUTO: 2.1 %
ERYTHROCYTE [DISTWIDTH] IN BLOOD BY AUTOMATED COUNT: 13.3 % (ref 11.5–14.5)
GLUCOSE BLD MANUAL STRIP-MCNC: 145 MG/DL (ref 74–99)
GLUCOSE BLD MANUAL STRIP-MCNC: 174 MG/DL (ref 74–99)
GLUCOSE BLD MANUAL STRIP-MCNC: 188 MG/DL (ref 74–99)
GLUCOSE BLD MANUAL STRIP-MCNC: 208 MG/DL (ref 74–99)
GLUCOSE SERPL-MCNC: 198 MG/DL (ref 74–99)
HCT VFR BLD AUTO: 36 % (ref 41–52)
HGB BLD-MCNC: 13 G/DL (ref 13.5–17.5)
IMM GRANULOCYTES # BLD AUTO: 0.03 X10*3/UL (ref 0–0.5)
IMM GRANULOCYTES NFR BLD AUTO: 0.4 % (ref 0–0.9)
LYMPHOCYTES # BLD AUTO: 3.69 X10*3/UL (ref 0.8–3)
LYMPHOCYTES NFR BLD AUTO: 45.1 %
MCH RBC QN AUTO: 27.6 PG (ref 26–34)
MCHC RBC AUTO-ENTMCNC: 36.1 G/DL (ref 32–36)
MCV RBC AUTO: 76 FL (ref 80–100)
MONOCYTES # BLD AUTO: 0.59 X10*3/UL (ref 0.05–0.8)
MONOCYTES NFR BLD AUTO: 7.2 %
NEUTROPHILS # BLD AUTO: 3.65 X10*3/UL (ref 1.6–5.5)
NEUTROPHILS NFR BLD AUTO: 44.6 %
NRBC BLD-RTO: 0 /100 WBCS (ref 0–0)
PLATELET # BLD AUTO: 262 X10*3/UL (ref 150–450)
POTASSIUM SERPL-SCNC: 3.6 MMOL/L (ref 3.5–5.3)
RBC # BLD AUTO: 4.71 X10*6/UL (ref 4.5–5.9)
SODIUM SERPL-SCNC: 137 MMOL/L (ref 136–145)
WBC # BLD AUTO: 8.2 X10*3/UL (ref 4.4–11.3)

## 2024-06-25 PROCEDURE — 2500000002 HC RX 250 W HCPCS SELF ADMINISTERED DRUGS (ALT 637 FOR MEDICARE OP, ALT 636 FOR OP/ED): Performed by: INTERNAL MEDICINE

## 2024-06-25 PROCEDURE — G0378 HOSPITAL OBSERVATION PER HR: HCPCS

## 2024-06-25 PROCEDURE — 96372 THER/PROPH/DIAG INJ SC/IM: CPT | Performed by: INTERNAL MEDICINE

## 2024-06-25 PROCEDURE — 36415 COLL VENOUS BLD VENIPUNCTURE: CPT | Performed by: INTERNAL MEDICINE

## 2024-06-25 PROCEDURE — 80048 BASIC METABOLIC PNL TOTAL CA: CPT | Performed by: INTERNAL MEDICINE

## 2024-06-25 PROCEDURE — 2500000001 HC RX 250 WO HCPCS SELF ADMINISTERED DRUGS (ALT 637 FOR MEDICARE OP): Performed by: INTERNAL MEDICINE

## 2024-06-25 PROCEDURE — 2500000004 HC RX 250 GENERAL PHARMACY W/ HCPCS (ALT 636 FOR OP/ED): Performed by: INTERNAL MEDICINE

## 2024-06-25 PROCEDURE — 85025 COMPLETE CBC W/AUTO DIFF WBC: CPT | Performed by: INTERNAL MEDICINE

## 2024-06-25 PROCEDURE — 82947 ASSAY GLUCOSE BLOOD QUANT: CPT | Mod: 91

## 2024-06-25 RX ORDER — INSULIN GLARGINE 100 [IU]/ML
10 INJECTION, SOLUTION SUBCUTANEOUS NIGHTLY
Qty: 3 ML | Refills: 0 | Status: SHIPPED | OUTPATIENT
Start: 2024-06-25 | End: 2024-07-25

## 2024-06-25 ASSESSMENT — PAIN SCALES - GENERAL: PAINLEVEL_OUTOF10: 0 - NO PAIN

## 2024-06-25 NOTE — PROGRESS NOTES
INTERNAL MEDICINE PROGRESS NOTE      HPI:    Patient remains pleasantly confused.  He denies pain.    Vital signs in last 24 hours:  Temp:  [36.3 °C (97.3 °F)-36.7 °C (98 °F)] 36.6 °C (97.9 °F)  Heart Rate:  [72-73] 73  Resp:  [16-18] 18  BP: (110-138)/(69-76) 115/75    Physical Examination:  Physical Exam    Constitutional:       Appearance: Elderly, overweight, in no distress.  HENT:      Head: Normocephalic and atraumatic.   Eyes:      Extraocular Movements: Extraocular movements intact.      Pupils: Pupils are equal, round, and reactive to light.   Cardiovascular:      Rate and Rhythm: Normal rate and regular rhythm.      Pulses: Normal pulses.      Heart sounds: Normal heart sounds.   Pulmonary:      Effort: Pulmonary effort is normal.      Breath sounds: Normal breath sounds.   Abdominal:      General: Abdomen is flat. Bowel sounds are normal.      Palpations: Abdomen is soft.   Musculoskeletal:         General: Normal range of motion.      Cervical back: Normal range of motion and neck supple.   Skin:     General: Skin is warm and dry.   Neurological:      General: No focal deficit present.      Mental Status: Alert and oriented x 1-2, moves all extremities with normal power.    Medications:    Current Facility-Administered Medications:     acetaminophen (Tylenol) tablet 650 mg, 650 mg, oral, q4h PRN **OR** acetaminophen (Tylenol) oral liquid 650 mg, 650 mg, oral, q4h PRN **OR** acetaminophen (Tylenol) suppository 650 mg, 650 mg, rectal, q4h PRN, Jeffery Jon MD    amLODIPine (Norvasc) tablet 5 mg, 5 mg, oral, Daily, Jeffery Jon MD, 5 mg at 06/25/24 0913    ARIPiprazole (Abilify) tablet 2 mg, 2 mg, oral, Daily, Jeffery Jon MD, 2 mg at 06/25/24 0913    atorvastatin (Lipitor) tablet 40 mg, 40 mg, oral, Nightly, Jeffery Jon MD, 40 mg at 06/24/24 2048    dextrose 50 % injection 12.5 g, 12.5 g, intravenous, q15 min PRN, Jeffery Jon MD    dextrose 50 % injection 25 g, 25 g,  intravenous, q15 min PRN, Jeffery Jon MD    glucagon (Glucagen) injection 1 mg, 1 mg, intramuscular, q15 min PRN, Jeffery Jon MD    glucagon (Glucagen) injection 1 mg, 1 mg, intramuscular, q15 min PRN, Jeffery Jon MD    heparin (porcine) injection 5,000 Units, 5,000 Units, subcutaneous, q8h MELINDA, Jeffery Jon MD, 5,000 Units at 06/25/24 0608    insulin glargine (Lantus) injection 10 Units, 10 Units, subcutaneous, Nightly, Jeffery Jon MD, 10 Units at 06/24/24 2048    insulin lispro (HumaLOG) injection 0-5 Units, 0-5 Units, subcutaneous, TID, Jeffery Jon MD, 1 Units at 06/25/24 0917    lisinopril tablet 40 mg, 40 mg, oral, Daily, Jeffery Jon MD, 40 mg at 06/25/24 0913    metFORMIN (Glucophage) tablet 1,000 mg, 1,000 mg, oral, BID, Jeffery Jon MD, 1,000 mg at 06/25/24 0913    polyethylene glycol (Glycolax, Miralax) packet 17 g, 17 g, oral, Daily, Jeffery Jon MD, 17 g at 06/25/24 0913    tamsulosin (Flomax) 24 hr capsule 0.4 mg, 0.4 mg, oral, Nightly, Jeffery Jon MD, 0.4 mg at 06/24/24 2048    Laboratory Findings:  Lab Results   Component Value Date    WBC 8.2 06/25/2024    HGB 13.0 (L) 06/25/2024    HCT 36.0 (L) 06/25/2024    MCV 76 (L) 06/25/2024     06/25/2024     Lab Results   Component Value Date    INR 1.0 04/04/2024    PROTIME 10.7 04/04/2024     Lab Results   Component Value Date    GLUCOSE 198 (H) 06/25/2024    CALCIUM 8.7 06/25/2024     06/25/2024    K 3.6 06/25/2024    CO2 26 06/25/2024     06/25/2024    BUN 11 06/25/2024    CREATININE 0.92 06/25/2024       Assessment and Plan:     Confusion -supportive measures, secondary to dementia.  Hyperglycemia -doing well on current regimen.  Hypertension -good control, same medications.  Hyperlipidemia -continue statin therapy.     Medically ready for discharge.      Jeffery Jon MD  06/25/24  11:33 AM

## 2024-06-25 NOTE — PROGRESS NOTES
06/25/24 0811   Discharge Planning   Patient expects to be discharged to: [facility]- medicaid LOC pending     Patient is medically ready for dc. Plan is to go to [facility]. SW requested LOC yesterday, facility can accept 6-26.

## 2024-06-25 NOTE — SIGNIFICANT EVENT
As the individual's attending physician , I certify that the above-named patient:  · Is being discharged to a nursing facility directly from a hospital after receiving acute patient care at the hospital, and  · Requires nursing facility services for the condition for which he/she received care in the hospital, and  · Requires fewer than 30 days of nursing facility services, no later than the date of discharge.    I certify that inpatient care is required at the level recommended above. To the best of my knowledge, all information provided about the individual is a true and accurate reflection of the individual's condition.

## 2024-06-26 LAB
ANION GAP SERPL CALC-SCNC: 14 MMOL/L (ref 10–20)
BASOPHILS # BLD AUTO: 0.04 X10*3/UL (ref 0–0.1)
BASOPHILS NFR BLD AUTO: 0.4 %
BUN SERPL-MCNC: 13 MG/DL (ref 6–23)
CALCIUM SERPL-MCNC: 8.9 MG/DL (ref 8.6–10.3)
CHLORIDE SERPL-SCNC: 103 MMOL/L (ref 98–107)
CO2 SERPL-SCNC: 23 MMOL/L (ref 21–32)
CREAT SERPL-MCNC: 0.86 MG/DL (ref 0.5–1.3)
EGFRCR SERPLBLD CKD-EPI 2021: 86 ML/MIN/1.73M*2
EOSINOPHIL # BLD AUTO: 0.21 X10*3/UL (ref 0–0.4)
EOSINOPHIL NFR BLD AUTO: 2.1 %
ERYTHROCYTE [DISTWIDTH] IN BLOOD BY AUTOMATED COUNT: 13.8 % (ref 11.5–14.5)
GLUCOSE BLD MANUAL STRIP-MCNC: 146 MG/DL (ref 74–99)
GLUCOSE BLD MANUAL STRIP-MCNC: 161 MG/DL (ref 74–99)
GLUCOSE BLD MANUAL STRIP-MCNC: 165 MG/DL (ref 74–99)
GLUCOSE BLD MANUAL STRIP-MCNC: 194 MG/DL (ref 74–99)
GLUCOSE SERPL-MCNC: 163 MG/DL (ref 74–99)
HCT VFR BLD AUTO: 38.4 % (ref 41–52)
HGB BLD-MCNC: 13.4 G/DL (ref 13.5–17.5)
IMM GRANULOCYTES # BLD AUTO: 0.04 X10*3/UL (ref 0–0.5)
IMM GRANULOCYTES NFR BLD AUTO: 0.4 % (ref 0–0.9)
LYMPHOCYTES # BLD AUTO: 4.27 X10*3/UL (ref 0.8–3)
LYMPHOCYTES NFR BLD AUTO: 42.1 %
MCH RBC QN AUTO: 27.7 PG (ref 26–34)
MCHC RBC AUTO-ENTMCNC: 34.9 G/DL (ref 32–36)
MCV RBC AUTO: 80 FL (ref 80–100)
MONOCYTES # BLD AUTO: 0.67 X10*3/UL (ref 0.05–0.8)
MONOCYTES NFR BLD AUTO: 6.6 %
NEUTROPHILS # BLD AUTO: 4.92 X10*3/UL (ref 1.6–5.5)
NEUTROPHILS NFR BLD AUTO: 48.4 %
NRBC BLD-RTO: 0 /100 WBCS (ref 0–0)
PLATELET # BLD AUTO: 265 X10*3/UL (ref 150–450)
POTASSIUM SERPL-SCNC: 3.7 MMOL/L (ref 3.5–5.3)
RBC # BLD AUTO: 4.83 X10*6/UL (ref 4.5–5.9)
SODIUM SERPL-SCNC: 136 MMOL/L (ref 136–145)
WBC # BLD AUTO: 10.2 X10*3/UL (ref 4.4–11.3)

## 2024-06-26 PROCEDURE — 96372 THER/PROPH/DIAG INJ SC/IM: CPT | Performed by: INTERNAL MEDICINE

## 2024-06-26 PROCEDURE — 82947 ASSAY GLUCOSE BLOOD QUANT: CPT

## 2024-06-26 PROCEDURE — 85025 COMPLETE CBC W/AUTO DIFF WBC: CPT | Performed by: INTERNAL MEDICINE

## 2024-06-26 PROCEDURE — 2500000002 HC RX 250 W HCPCS SELF ADMINISTERED DRUGS (ALT 637 FOR MEDICARE OP, ALT 636 FOR OP/ED): Performed by: INTERNAL MEDICINE

## 2024-06-26 PROCEDURE — 2500000004 HC RX 250 GENERAL PHARMACY W/ HCPCS (ALT 636 FOR OP/ED): Performed by: INTERNAL MEDICINE

## 2024-06-26 PROCEDURE — 80048 BASIC METABOLIC PNL TOTAL CA: CPT | Performed by: INTERNAL MEDICINE

## 2024-06-26 PROCEDURE — 36415 COLL VENOUS BLD VENIPUNCTURE: CPT | Performed by: INTERNAL MEDICINE

## 2024-06-26 PROCEDURE — G0378 HOSPITAL OBSERVATION PER HR: HCPCS

## 2024-06-26 PROCEDURE — 2500000001 HC RX 250 WO HCPCS SELF ADMINISTERED DRUGS (ALT 637 FOR MEDICARE OP): Performed by: INTERNAL MEDICINE

## 2024-06-26 ASSESSMENT — COGNITIVE AND FUNCTIONAL STATUS - GENERAL
DRESSING REGULAR LOWER BODY CLOTHING: A LITTLE
STANDING UP FROM CHAIR USING ARMS: A LITTLE
CLIMB 3 TO 5 STEPS WITH RAILING: A LITTLE
TOILETING: A LITTLE
HELP NEEDED FOR BATHING: A LITTLE
DRESSING REGULAR UPPER BODY CLOTHING: A LITTLE
MOBILITY SCORE: 21
PERSONAL GROOMING: A LITTLE
TOILETING: A LITTLE
PERSONAL GROOMING: A LITTLE
WALKING IN HOSPITAL ROOM: A LITTLE
DRESSING REGULAR UPPER BODY CLOTHING: A LITTLE
HELP NEEDED FOR BATHING: A LITTLE
DAILY ACTIVITIY SCORE: 19
DRESSING REGULAR LOWER BODY CLOTHING: A LITTLE
STANDING UP FROM CHAIR USING ARMS: A LITTLE
MOBILITY SCORE: 21
CLIMB 3 TO 5 STEPS WITH RAILING: A LITTLE
DAILY ACTIVITIY SCORE: 19
WALKING IN HOSPITAL ROOM: A LITTLE

## 2024-06-26 ASSESSMENT — PAIN SCALES - GENERAL: PAINLEVEL_OUTOF10: 0 - NO PAIN

## 2024-06-26 NOTE — CONSULTS
Nutrition Assessment Note    Reason for Assessment  Reason for Assessment: Admission nursing screening    Pt admitted for:  Altered mental status [R41.82]  Dementia without behavioral disturbance, psychotic disturbance, mood disturbance, or anxiety, unspecified dementia severity, unspecified dementia type (Multi) [F03.90]    MST triggered for unsure for weight loss.  No weight loss noted in weight history.  Per flowsheets pt with good intake.  Skin WDL.    No nutritional concerns at this time. Full nutritional assessment unwarranted at this time. Please re consult nutrition services should concerns present.     Past Medical History:   Diagnosis Date    BPPV (benign paroxysmal positional vertigo)     Diabetes mellitus (Multi)     HLD (hyperlipidemia)     Hypertension     Pituitary adenoma (Multi)     Prostate cancer (Multi)      Results for orders placed or performed during the hospital encounter of 06/22/24 (from the past 24 hour(s))   POCT GLUCOSE   Result Value Ref Range    POCT Glucose 208 (H) 74 - 99 mg/dL   POCT GLUCOSE   Result Value Ref Range    POCT Glucose 174 (H) 74 - 99 mg/dL   POCT GLUCOSE   Result Value Ref Range    POCT Glucose 145 (H) 74 - 99 mg/dL   CBC and Auto Differential   Result Value Ref Range    WBC 10.2 4.4 - 11.3 x10*3/uL    nRBC 0.0 0.0 - 0.0 /100 WBCs    RBC 4.83 4.50 - 5.90 x10*6/uL    Hemoglobin 13.4 (L) 13.5 - 17.5 g/dL    Hematocrit 38.4 (L) 41.0 - 52.0 %    MCV 80 80 - 100 fL    MCH 27.7 26.0 - 34.0 pg    MCHC 34.9 32.0 - 36.0 g/dL    RDW 13.8 11.5 - 14.5 %    Platelets 265 150 - 450 x10*3/uL    Neutrophils % 48.4 40.0 - 80.0 %    Immature Granulocytes %, Automated 0.4 0.0 - 0.9 %    Lymphocytes % 42.1 13.0 - 44.0 %    Monocytes % 6.6 2.0 - 10.0 %    Eosinophils % 2.1 0.0 - 6.0 %    Basophils % 0.4 0.0 - 2.0 %    Neutrophils Absolute 4.92 1.60 - 5.50 x10*3/uL    Immature Granulocytes Absolute, Automated 0.04 0.00 - 0.50 x10*3/uL    Lymphocytes Absolute 4.27 (H) 0.80 - 3.00 x10*3/uL     "Monocytes Absolute 0.67 0.05 - 0.80 x10*3/uL    Eosinophils Absolute 0.21 0.00 - 0.40 x10*3/uL    Basophils Absolute 0.04 0.00 - 0.10 x10*3/uL   Basic Metabolic Panel   Result Value Ref Range    Glucose 163 (H) 74 - 99 mg/dL    Sodium 136 136 - 145 mmol/L    Potassium 3.7 3.5 - 5.3 mmol/L    Chloride 103 98 - 107 mmol/L    Bicarbonate 23 21 - 32 mmol/L    Anion Gap 14 10 - 20 mmol/L    Urea Nitrogen 13 6 - 23 mg/dL    Creatinine 0.86 0.50 - 1.30 mg/dL    eGFR 86 >60 mL/min/1.73m*2    Calcium 8.9 8.6 - 10.3 mg/dL   POCT GLUCOSE   Result Value Ref Range    POCT Glucose 165 (H) 74 - 99 mg/dL     Scheduled medications  amLODIPine, 5 mg, oral, Daily  ARIPiprazole, 2 mg, oral, Daily  atorvastatin, 40 mg, oral, Nightly  heparin (porcine), 5,000 Units, subcutaneous, q8h MELINDA  insulin glargine, 10 Units, subcutaneous, Nightly  insulin lispro, 0-5 Units, subcutaneous, TID  lisinopril, 40 mg, oral, Daily  metFORMIN, 1,000 mg, oral, BID  polyethylene glycol, 17 g, oral, Daily  tamsulosin, 0.4 mg, oral, Nightly      Continuous medications     PRN medications  PRN medications: acetaminophen **OR** acetaminophen **OR** acetaminophen, dextrose, dextrose, glucagon, glucagon  Dietary Orders (From admission, onward)       Start     Ordered    06/23/24 0015  Adult diet Regular  Diet effective now        Question:  Diet type  Answer:  Regular    06/23/24 0017                  History:  Food and Nutrient History  Energy Intake: Good > 75 %    Anthropometrics:  Height: 170.2 cm (5' 7.01\")  Weight: 83.1 kg (183 lb 3.2 oz)    Wt Readings from Last 3 Encounters:   06/22/24 83.1 kg (183 lb 3.2 oz)   04/04/24 82.6 kg (182 lb 1.6 oz)   10/06/23 82.6 kg (182 lb)     Weight Change  Significant Weight Loss: No    Nutrition Focused Physical Findings:  Edema  Edema: +1 trace  Edema Location: RLE, LLE    Physical Findings (Nutrition Deficiency/Toxicity)  Skin: Negative     Nutrition Interventions/Recommendations   Food and/or Nutrient Delivery " Interventions  Meals and Snacks: Carbohydrate-modified diet  Goal: Consider changing diet order to DM diet due to DM dx         Follow Up  Time Spent (min): 45 minutes  Last Date of Nutrition Visit: 06/26/24  Nutrition Follow-Up Needed?: Dietitian to reassess per policy  Follow up Comment: STEVAN CORNEJO

## 2024-06-26 NOTE — PROGRESS NOTES
INTERNAL MEDICINE PROGRESS NOTE      HPI:    No significant change.  Lying in bed in no acute distress.    Vital signs in last 24 hours:  Temp:  [36.3 °C (97.3 °F)-36.5 °C (97.7 °F)] 36.3 °C (97.4 °F)  Heart Rate:  [66-93] 93  Resp:  [18] 18  BP: (106-153)/(68-81) 107/73    Physical Examination:  Physical Exam    Constitutional:       Appearance: Elderly, overweight, in no distress.  HENT:      Head: Normocephalic and atraumatic.   Eyes:      Extraocular Movements: Extraocular movements intact.      Pupils: Pupils are equal, round, and reactive to light.   Cardiovascular:      Rate and Rhythm: Normal rate and regular rhythm.      Pulses: Normal pulses.      Heart sounds: Normal heart sounds.   Pulmonary:      Effort: Pulmonary effort is normal.      Breath sounds: Normal breath sounds.   Abdominal:      General: Abdomen is flat. Bowel sounds are normal.      Palpations: Abdomen is soft.   Musculoskeletal:         General: Normal range of motion.      Cervical back: Normal range of motion and neck supple.   Skin:     General: Skin is warm and dry.   Neurological:      General: No focal deficit present.      Mental Status: Alert and oriented x 1-2, moves all extremities with normal power.    Medications:    Current Facility-Administered Medications:     acetaminophen (Tylenol) tablet 650 mg, 650 mg, oral, q4h PRN **OR** acetaminophen (Tylenol) oral liquid 650 mg, 650 mg, oral, q4h PRN **OR** acetaminophen (Tylenol) suppository 650 mg, 650 mg, rectal, q4h PRN, Jeffery Jon MD    amLODIPine (Norvasc) tablet 5 mg, 5 mg, oral, Daily, Jeffery Jon MD, 5 mg at 06/25/24 0913    ARIPiprazole (Abilify) tablet 2 mg, 2 mg, oral, Daily, Jeffery Jon MD, 2 mg at 06/26/24 1020    atorvastatin (Lipitor) tablet 40 mg, 40 mg, oral, Nightly, Jeffery Jon MD, 40 mg at 06/25/24 2135    dextrose 50 % injection 12.5 g, 12.5 g, intravenous, q15 min PRN, Jeffery Jon MD    dextrose 50 % injection 25 g, 25  g, intravenous, q15 min PRN, Jeffery Jon MD    glucagon (Glucagen) injection 1 mg, 1 mg, intramuscular, q15 min PRN, Jeffery Jon MD    glucagon (Glucagen) injection 1 mg, 1 mg, intramuscular, q15 min PRN, Jeffery Jon MD    heparin (porcine) injection 5,000 Units, 5,000 Units, subcutaneous, q8h MELINDA, Jeffery Jon MD, 5,000 Units at 06/26/24 0537    insulin glargine (Lantus) injection 10 Units, 10 Units, subcutaneous, Nightly, Jeffery Jon MD, 10 Units at 06/25/24 2142    insulin lispro (HumaLOG) injection 0-5 Units, 0-5 Units, subcutaneous, TID, Jeffery Jon MD, 1 Units at 06/26/24 1021    lisinopril tablet 40 mg, 40 mg, oral, Daily, Jeffery Jon MD, 40 mg at 06/25/24 0913    metFORMIN (Glucophage) tablet 1,000 mg, 1,000 mg, oral, BID, Jeffery Jon MD, 1,000 mg at 06/26/24 1020    polyethylene glycol (Glycolax, Miralax) packet 17 g, 17 g, oral, Daily, Jeffery Jon MD, 17 g at 06/26/24 1020    tamsulosin (Flomax) 24 hr capsule 0.4 mg, 0.4 mg, oral, Nightly, Jeffery Jon MD, 0.4 mg at 06/25/24 2135    Laboratory Findings:  Lab Results   Component Value Date    WBC 10.2 06/26/2024    HGB 13.4 (L) 06/26/2024    HCT 38.4 (L) 06/26/2024    MCV 80 06/26/2024     06/26/2024     Lab Results   Component Value Date    INR 1.0 04/04/2024    PROTIME 10.7 04/04/2024     Lab Results   Component Value Date    GLUCOSE 163 (H) 06/26/2024    CALCIUM 8.9 06/26/2024     06/26/2024    K 3.7 06/26/2024    CO2 23 06/26/2024     06/26/2024    BUN 13 06/26/2024    CREATININE 0.86 06/26/2024       Assessment and Plan:     Confusion -mentation unchanged.  Hyperglycemia -stable on current regimen.  Hypertension -good control, same medications.  Hyperlipidemia -continue statin therapy.     Discharge to nursing home today.      Jeffery Jon MD  06/26/24  10:45 AM

## 2024-06-26 NOTE — CARE PLAN
The patient's goals for the shift include      The clinical goals for the shift include Patient will remain free from falls this shift.      Problem: Skin  Goal: Decreased wound size/increased tissue granulation at next dressing change  Outcome: Progressing  Goal: Participates in plan/prevention/treatment measures  Outcome: Progressing  Goal: Prevent/manage excess moisture  Outcome: Progressing  Goal: Prevent/minimize sheer/friction injuries  Outcome: Progressing  Goal: Promote/optimize nutrition  Outcome: Progressing  Goal: Promote skin healing  Outcome: Progressing     Problem: Fall/Injury  Goal: Not fall by end of shift  Outcome: Progressing  Goal: Be free from injury by end of the shift  Outcome: Progressing  Goal: Verbalize understanding of personal risk factors for fall in the hospital  Outcome: Progressing  Goal: Verbalize understanding of risk factor reduction measures to prevent injury from fall in the home  Outcome: Progressing  Goal: Use assistive devices by end of the shift  Outcome: Progressing  Goal: Pace activities to prevent fatigue by end of the shift  Outcome: Progressing

## 2024-06-27 ENCOUNTER — APPOINTMENT (OUTPATIENT)
Dept: CARDIOLOGY | Facility: HOSPITAL | Age: 84
End: 2024-06-27
Payer: COMMERCIAL

## 2024-06-27 VITALS
OXYGEN SATURATION: 96 % | DIASTOLIC BLOOD PRESSURE: 68 MMHG | WEIGHT: 183.2 LBS | BODY MASS INDEX: 28.75 KG/M2 | HEART RATE: 85 BPM | TEMPERATURE: 98.1 F | HEIGHT: 67 IN | SYSTOLIC BLOOD PRESSURE: 100 MMHG | RESPIRATION RATE: 18 BRPM

## 2024-06-27 LAB
GLUCOSE BLD MANUAL STRIP-MCNC: 134 MG/DL (ref 74–99)
GLUCOSE BLD MANUAL STRIP-MCNC: 168 MG/DL (ref 74–99)

## 2024-06-27 PROCEDURE — 2500000001 HC RX 250 WO HCPCS SELF ADMINISTERED DRUGS (ALT 637 FOR MEDICARE OP): Performed by: INTERNAL MEDICINE

## 2024-06-27 PROCEDURE — 96372 THER/PROPH/DIAG INJ SC/IM: CPT | Performed by: INTERNAL MEDICINE

## 2024-06-27 PROCEDURE — 2500000002 HC RX 250 W HCPCS SELF ADMINISTERED DRUGS (ALT 637 FOR MEDICARE OP, ALT 636 FOR OP/ED): Performed by: INTERNAL MEDICINE

## 2024-06-27 PROCEDURE — 82947 ASSAY GLUCOSE BLOOD QUANT: CPT

## 2024-06-27 PROCEDURE — 93005 ELECTROCARDIOGRAM TRACING: CPT

## 2024-06-27 PROCEDURE — G0378 HOSPITAL OBSERVATION PER HR: HCPCS

## 2024-06-27 PROCEDURE — 2500000004 HC RX 250 GENERAL PHARMACY W/ HCPCS (ALT 636 FOR OP/ED): Performed by: INTERNAL MEDICINE

## 2024-06-27 ASSESSMENT — COGNITIVE AND FUNCTIONAL STATUS - GENERAL
TOILETING: A LITTLE
DRESSING REGULAR UPPER BODY CLOTHING: A LITTLE
DRESSING REGULAR LOWER BODY CLOTHING: A LITTLE
PERSONAL GROOMING: A LITTLE
MOBILITY SCORE: 21
HELP NEEDED FOR BATHING: A LITTLE
WALKING IN HOSPITAL ROOM: A LITTLE
DAILY ACTIVITIY SCORE: 19
CLIMB 3 TO 5 STEPS WITH RAILING: A LITTLE
STANDING UP FROM CHAIR USING ARMS: A LITTLE

## 2024-06-27 ASSESSMENT — PAIN SCALES - GENERAL: PAINLEVEL_OUTOF10: 0 - NO PAIN

## 2024-06-27 NOTE — CARE PLAN
The patient's goals for the shift include      The clinical goals for the shift include Patient will be free of falls this shift.

## 2024-06-27 NOTE — CARE PLAN
The patient's goals for the shift include      The clinical goals for the shift include Patient will be free of falls this shift.      Problem: Skin  Goal: Decreased wound size/increased tissue granulation at next dressing change  Outcome: Progressing  Goal: Participates in plan/prevention/treatment measures  Outcome: Progressing  Goal: Prevent/manage excess moisture  Outcome: Progressing  Goal: Prevent/minimize sheer/friction injuries  Outcome: Progressing  Goal: Promote/optimize nutrition  Outcome: Progressing  Goal: Promote skin healing  Outcome: Progressing     Problem: Fall/Injury  Goal: Not fall by end of shift  Outcome: Progressing  Goal: Be free from injury by end of the shift  Outcome: Progressing  Goal: Verbalize understanding of personal risk factors for fall in the hospital  Outcome: Progressing  Goal: Verbalize understanding of risk factor reduction measures to prevent injury from fall in the home  Outcome: Progressing  Goal: Use assistive devices by end of the shift  Outcome: Progressing  Goal: Pace activities to prevent fatigue by end of the shift  Outcome: Progressing

## 2024-06-27 NOTE — PROGRESS NOTES
Care Coordinator Note:    Plan: Patient is med ready. Received LOC for SNF placement. Will dc to [facility] today    Status: OBS   Payor: [employer] dual  Disposition: [facility]- received LOC today  Barrier: none  ADOD: today- transport set for 1300. MD RN SEC are aware    Eileen Chin [facility]      06/27/24 1211   Discharge Planning   Patient expects to be discharged to: [facility]- with new LOC received 6/27

## 2024-06-27 NOTE — PROGRESS NOTES
INTERNAL MEDICINE PROGRESS NOTE      HPI:    Doing well.  Lying in bed in no acute distress.    Vital signs in last 24 hours:  Temp:  [36.5 °C (97.7 °F)-37 °C (98.6 °F)] 36.7 °C (98.1 °F)  Heart Rate:  [76-85] 85  Resp:  [15-18] 18  BP: (100-139)/(62-77) 100/68    Physical Examination:  Physical Exam    Constitutional:       Appearance: Elderly, overweight, in no distress.  HENT:      Head: Normocephalic and atraumatic.   Eyes:      Extraocular Movements: Extraocular movements intact.      Pupils: Pupils are equal, round, and reactive to light.   Cardiovascular:      Rate and Rhythm: Normal rate and regular rhythm.      Pulses: Normal pulses.      Heart sounds: Normal heart sounds.   Pulmonary:      Effort: Pulmonary effort is normal.      Breath sounds: Normal breath sounds.   Abdominal:      General: Abdomen is flat. Bowel sounds are normal.      Palpations: Abdomen is soft.   Musculoskeletal:         General: Normal range of motion.      Cervical back: Normal range of motion and neck supple.   Skin:     General: Skin is warm and dry.   Neurological:      General: No focal deficit present.      Mental Status: Alert and oriented x 1-2, moves all extremities with normal power.    Medications:    Current Facility-Administered Medications:     acetaminophen (Tylenol) tablet 650 mg, 650 mg, oral, q4h PRN **OR** acetaminophen (Tylenol) oral liquid 650 mg, 650 mg, oral, q4h PRN **OR** acetaminophen (Tylenol) suppository 650 mg, 650 mg, rectal, q4h PRN, Jeffery Jon MD    amLODIPine (Norvasc) tablet 5 mg, 5 mg, oral, Daily, Jeffery Jon MD, 5 mg at 06/27/24 0845    ARIPiprazole (Abilify) tablet 2 mg, 2 mg, oral, Daily, Jeffery Jon MD, 2 mg at 06/27/24 0843    atorvastatin (Lipitor) tablet 40 mg, 40 mg, oral, Nightly, Jeffery Jon MD, 40 mg at 06/26/24 2057    dextrose 50 % injection 12.5 g, 12.5 g, intravenous, q15 min PRN, Jeffery Jon MD    dextrose 50 % injection 25 g, 25 g,  intravenous, q15 min PRN, Jeffery Jon MD    glucagon (Glucagen) injection 1 mg, 1 mg, intramuscular, q15 min PRN, Jeffery Jon MD    glucagon (Glucagen) injection 1 mg, 1 mg, intramuscular, q15 min PRN, Jeffery Jon MD    heparin (porcine) injection 5,000 Units, 5,000 Units, subcutaneous, q8h MELINDA, Jeffery Jon MD, 5,000 Units at 06/27/24 0638    insulin glargine (Lantus) injection 10 Units, 10 Units, subcutaneous, Nightly, Jeffery Jon MD, 10 Units at 06/26/24 2131    insulin lispro (HumaLOG) injection 0-5 Units, 0-5 Units, subcutaneous, TID, Jeffery Jon MD, 1 Units at 06/27/24 0843    lisinopril tablet 40 mg, 40 mg, oral, Daily, Jeffery Jon MD, 40 mg at 06/27/24 0843    metFORMIN (Glucophage) tablet 1,000 mg, 1,000 mg, oral, BID, Jeffery Jon MD, 1,000 mg at 06/27/24 0843    polyethylene glycol (Glycolax, Miralax) packet 17 g, 17 g, oral, Daily, Jeffery Jon MD, 17 g at 06/27/24 0843    tamsulosin (Flomax) 24 hr capsule 0.4 mg, 0.4 mg, oral, Nightly, Jeffery Jon MD, 0.4 mg at 06/26/24 2057    Laboratory Findings:  Lab Results   Component Value Date    WBC 10.2 06/26/2024    HGB 13.4 (L) 06/26/2024    HCT 38.4 (L) 06/26/2024    MCV 80 06/26/2024     06/26/2024     Lab Results   Component Value Date    INR 1.0 04/04/2024    PROTIME 10.7 04/04/2024     Lab Results   Component Value Date    GLUCOSE 163 (H) 06/26/2024    CALCIUM 8.9 06/26/2024     06/26/2024    K 3.7 06/26/2024    CO2 23 06/26/2024     06/26/2024    BUN 13 06/26/2024    CREATININE 0.86 06/26/2024       Assessment and Plan:     Confusion -mentation stable, supportive measures.  Hyperglycemia -stable on current regimen.  Hypertension -good control, same medications.  Hyperlipidemia -continue statin therapy.     Discharge to nursing home today.      Jeffery Jon MD  06/27/24  11:39 AM

## 2024-06-28 ENCOUNTER — NURSING HOME VISIT (OUTPATIENT)
Dept: POST ACUTE CARE | Facility: EXTERNAL LOCATION | Age: 84
End: 2024-06-28
Payer: COMMERCIAL

## 2024-06-28 DIAGNOSIS — R41.82 ALTERED MENTAL STATUS, UNSPECIFIED ALTERED MENTAL STATUS TYPE: ICD-10-CM

## 2024-06-28 DIAGNOSIS — R53.1 WEAKNESS: Primary | ICD-10-CM

## 2024-06-28 DIAGNOSIS — Z79.4 TYPE 2 DIABETES MELLITUS WITHOUT COMPLICATION, WITH LONG-TERM CURRENT USE OF INSULIN (MULTI): ICD-10-CM

## 2024-06-28 DIAGNOSIS — E78.5 HYPERLIPIDEMIA, UNSPECIFIED HYPERLIPIDEMIA TYPE: ICD-10-CM

## 2024-06-28 DIAGNOSIS — I10 ESSENTIAL HYPERTENSION: ICD-10-CM

## 2024-06-28 DIAGNOSIS — E11.9 TYPE 2 DIABETES MELLITUS WITHOUT COMPLICATION, WITH LONG-TERM CURRENT USE OF INSULIN (MULTI): ICD-10-CM

## 2024-06-28 DIAGNOSIS — N40.1 BENIGN LOCALIZED HYPERPLASIA OF PROSTATE WITH URINARY OBSTRUCTION AND LOWER URINARY TRACT SYMPTOMS: ICD-10-CM

## 2024-06-28 DIAGNOSIS — N13.9 BENIGN LOCALIZED HYPERPLASIA OF PROSTATE WITH URINARY OBSTRUCTION AND LOWER URINARY TRACT SYMPTOMS: ICD-10-CM

## 2024-06-28 PROCEDURE — 99309 SBSQ NF CARE MODERATE MDM 30: CPT | Performed by: NURSE PRACTITIONER

## 2024-06-28 NOTE — LETTER
Patient: Homero Stone  : 1940    Encounter Date: 2024    PROGRESS NOTE    Subjective  Chief complaint: Homero Stone is a 83 y.o. male who is an acute skilled patient being seen and evaluated for weakness    HPI: recently admitted to this facility for rehabilitation. Is in bed. Reports that he is comfortable. Denies any distress.    Falguni historian regarding his medical condition. Is scheduled for therapy assessment following this visit.   HPI      Objective  Vital signs: 100/60-75-18-97.3     Physical Exam  Vitals and nursing note reviewed.   Constitutional:       General: He is not in acute distress.  Eyes:      Extraocular Movements: Extraocular movements intact.   Cardiovascular:      Rate and Rhythm: Normal rate and regular rhythm.   Pulmonary:      Effort: Pulmonary effort is normal.      Breath sounds: Normal breath sounds.      Comments: Lungs clear   Abdominal:      General: Bowel sounds are normal.      Palpations: Abdomen is soft.   Musculoskeletal:      Cervical back: Neck supple.      Right lower leg: No edema.      Left lower leg: No edema.   Neurological:      Mental Status: He is alert.   Psychiatric:         Mood and Affect: Mood normal.         Behavior: Behavior is cooperative.         Cognition and Memory: Cognition is impaired. Memory is impaired.         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     Tamulosin    HX prostate cancer   Monitor for any difficulties voiding          Type 2 diabetes mellitus without complication, with long-term current use of insulin (Multi)     Metformin   Blood sugars checks 2 x day  Insulin glargine          Essential hypertension     Lisinopril    Monitor BP   Monitor labs          HLD (hyperlipidemia)      Monitor lipids   Remains on atorvastatin          Weakness - Primary     Requires rehabilitation   Monitor progress             Medications, treatments, and labs reviewed  Continue medications and treatments as listed in EMR    COREY Roche      Electronically Signed By: COREY Roche   6/29/24  1:27 PM

## 2024-06-29 PROBLEM — R53.1 WEAKNESS: Status: ACTIVE | Noted: 2024-06-29

## 2024-06-29 NOTE — PROGRESS NOTES
PROGRESS NOTE    Subjective   Chief complaint: Homero Stone is a 83 y.o. male who is an acute skilled patient being seen and evaluated for weakness    HPI: recently admitted to this facility for rehabilitation. Is in bed. Reports that he is comfortable. Denies any distress.    Falguni historian regarding his medical condition. Is scheduled for therapy assessment following this visit.   HPI      Objective   Vital signs: 100/60-75-18-97.3     Physical Exam  Vitals and nursing note reviewed.   Constitutional:       General: He is not in acute distress.  Eyes:      Extraocular Movements: Extraocular movements intact.   Cardiovascular:      Rate and Rhythm: Normal rate and regular rhythm.   Pulmonary:      Effort: Pulmonary effort is normal.      Breath sounds: Normal breath sounds.      Comments: Lungs clear   Abdominal:      General: Bowel sounds are normal.      Palpations: Abdomen is soft.   Musculoskeletal:      Cervical back: Neck supple.      Right lower leg: No edema.      Left lower leg: No edema.   Neurological:      Mental Status: He is alert.   Psychiatric:         Mood and Affect: Mood normal.         Behavior: Behavior is cooperative.         Cognition and Memory: Cognition is impaired. Memory is impaired.         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     Tamulosin    HX prostate cancer   Monitor for any difficulties voiding          Type 2 diabetes mellitus without complication, with long-term current use of insulin (Multi)     Metformin   Blood sugars checks 2 x day  Insulin glargine          Essential hypertension     Lisinopril    Monitor BP   Monitor labs          HLD (hyperlipidemia)     Monitor lipids   Remains on atorvastatin          Weakness -  Primary     Requires rehabilitation   Monitor progress             Medications, treatments, and labs reviewed  Continue medications and treatments as listed in EMR    Yocasta Diaz, APRN-CNP

## 2024-06-29 NOTE — DISCHARGE SUMMARY
Discharge Diagnosis  Altered mental status    Issues Requiring Follow-Up      Discharge Meds     Your medication list        START taking these medications        Instructions Last Dose Given Next Dose Due   insulin glargine 100 unit/mL injection  Commonly known as: Lantus      Inject 10 Units under the skin once daily at bedtime. Take as directed per insulin instructions.              CONTINUE taking these medications        Instructions Last Dose Given Next Dose Due   amLODIPine 5 mg tablet  Commonly known as: Norvasc           ARIPiprazole 2 mg tablet  Commonly known as: Abilify           atorvastatin 40 mg tablet  Commonly known as: Lipitor           lisinopril 40 mg tablet           metFORMIN 1,000 mg tablet  Commonly known as: Glucophage           tamsulosin 0.4 mg 24 hr capsule  Commonly known as: Flomax                     Where to Get Your Medications        You can get these medications from any pharmacy    Bring a paper prescription for each of these medications  insulin glargine 100 unit/mL injection         Test Results Pending At Discharge  Pending Labs       Order Current Status    Extra Urine Gray Tube Collected (06/22/24 2222)    Urinalysis with Reflex Culture and Microscopic In process            Hospital Course   Homero Stone is a 83 y.o. male presenting with increased confusion.  Patient has a history of dementia.  He was recently at a nursing home for rehab and was discharged to the care of his family about 2 weeks ago.  Family reports difficulty caring for him.  He also has not been taking his insulin for the past few days.  He has not had prescriptions for this.  Family is interested in long-term placement for the patient due to inability to be alone at home.  In the emergency room he was found to have some hyperglycemia.  Currently he is comfortably sitting up in no distress.  He denies headaches.     The patient's mentation improved, BS remained stable. He was stable for dc to the NH  for further care.     Pertinent Physical Exam At Time of Discharge      Outpatient Follow-Up  No future appointments.    Time and care for discharge management < 30 minutes.     Jeffery Jon MD

## 2024-07-01 ENCOUNTER — NURSING HOME VISIT (OUTPATIENT)
Dept: POST ACUTE CARE | Facility: EXTERNAL LOCATION | Age: 84
End: 2024-07-01
Payer: COMMERCIAL

## 2024-07-01 DIAGNOSIS — Z79.4 TYPE 2 DIABETES MELLITUS WITHOUT COMPLICATION, WITH LONG-TERM CURRENT USE OF INSULIN (MULTI): ICD-10-CM

## 2024-07-01 DIAGNOSIS — N40.1 BENIGN LOCALIZED HYPERPLASIA OF PROSTATE WITH URINARY OBSTRUCTION AND LOWER URINARY TRACT SYMPTOMS: ICD-10-CM

## 2024-07-01 DIAGNOSIS — F03.90 DEMENTIA, UNSPECIFIED DEMENTIA SEVERITY, UNSPECIFIED DEMENTIA TYPE, UNSPECIFIED WHETHER BEHAVIORAL, PSYCHOTIC, OR MOOD DISTURBANCE OR ANXIETY (MULTI): Primary | ICD-10-CM

## 2024-07-01 DIAGNOSIS — I10 ESSENTIAL HYPERTENSION: ICD-10-CM

## 2024-07-01 DIAGNOSIS — E78.5 HYPERLIPIDEMIA, UNSPECIFIED HYPERLIPIDEMIA TYPE: ICD-10-CM

## 2024-07-01 DIAGNOSIS — N13.9 BENIGN LOCALIZED HYPERPLASIA OF PROSTATE WITH URINARY OBSTRUCTION AND LOWER URINARY TRACT SYMPTOMS: ICD-10-CM

## 2024-07-01 DIAGNOSIS — E11.9 TYPE 2 DIABETES MELLITUS WITHOUT COMPLICATION, WITH LONG-TERM CURRENT USE OF INSULIN (MULTI): ICD-10-CM

## 2024-07-01 PROCEDURE — 99305 1ST NF CARE MODERATE MDM 35: CPT | Performed by: INTERNAL MEDICINE

## 2024-07-01 NOTE — PROGRESS NOTES
HISTORY & PHYSICAL    Subjective   Chief complaint: Homero Stone is a 83 y.o. male who is being seen and evaluated for multiple medical problems.  Patient admitted to SNF for therapy due to weakness after recent hospitalization.    HPI:  HPI  Patient had presented to ED due to increased confusion.  Patient does have a past history significant for dementia.  Patient had been in nursing facility for rehab and was discharged with family 2 weeks prior to admission.  Family unable to care for him and reported the patient has been noncompliant with medications.  Family would like long-term care placement due to inability to care for self at home.  Patient did have hyperglycemia in the ED, treated with improvement in blood sugars.  Patient was hemodynamically stable to discharge to SNF for continued medical management and therapy and long-term care placement.  Patient seen and examined at the bedside, appears to be in no acute distress.  Denies chest pain, shortness of breath, nausea or vomiting.  Past Medical History:   Diagnosis Date    BPPV (benign paroxysmal positional vertigo)     Diabetes mellitus (Multi)     HLD (hyperlipidemia)     Hypertension     Pituitary adenoma (Multi)     Prostate cancer (Multi)        Past Surgical History:   Procedure Laterality Date    CT ANGIO NECK  3/2/2017    CT NECK ANGIO W AND WO IV CONTRAST 3/2/2017 [facility] [place] [facility]GENCY LEGAC[facility]    CT HEAD ANGIO W AND WO IV CONTRAST  3/2/2017    CT HEAD ANGIO W AND WO IV CONTRAST 3/2/2017 [facility] [place] [facility]GENCY LEGAC[facility]       Family History   Problem Relation Name Age of Onset    No Known Problems Mother      No Known Problems Father         Social History     Socioeconomic History    Marital status: Single     Spouse name: Not on file    Number of children: Not on file    Years of education: Not on file    Highest education level: Not on file   Occupational History    Not on file   Tobacco Use    Smoking status: Former     Types: Cigarettes    Smokeless tobacco:  Never   Substance and Sexual Activity    Alcohol use: Not Currently    Drug use: Never    Sexual activity: Defer   Other Topics Concern    Not on file   Social History Narrative    Not on file     Social Determinants of Health     Financial Resource Strain: Low Risk  (6/23/2024)    Overall Financial Resource Strain (CARDIA)     Difficulty of Paying Living Expenses: Not hard at all   Food Insecurity: Not on file   Transportation Needs: No Transportation Needs (6/23/2024)    PRAPARE - Transportation     Lack of Transportation (Medical): No     Lack of Transportation (Non-Medical): No   Physical Activity: Not on file   Stress: Not on file   Social Connections: Not on file   Intimate Partner Violence: Not on file   Housing Stability: Low Risk  (6/23/2024)    Housing Stability Vital Sign     Unable to Pay for Housing in the Last Year: No     Number of Places Lived in the Last Year: 1     Unstable Housing in the Last Year: No       Vital signs: 106/64, 97.1, 74, 18, 98%, blood sugar 140    Objective   Physical Exam  Constitutional:       General: He is not in acute distress.  Eyes:      Extraocular Movements: Extraocular movements intact.   Cardiovascular:      Rate and Rhythm: Normal rate and regular rhythm.   Pulmonary:      Effort: Pulmonary effort is normal.      Breath sounds: Normal breath sounds.      Comments: Lungs clear   Abdominal:      General: Bowel sounds are normal.      Palpations: Abdomen is soft.   Musculoskeletal:      Cervical back: Neck supple.      Right lower leg: No edema.      Left lower leg: No edema.   Neurological:      Mental Status: He is alert.   Psychiatric:         Mood and Affect: Mood normal.         Behavior: Behavior is cooperative.         Cognition and Memory: Cognition is impaired. Memory is impaired.         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  Tamsulosin         Type 2 diabetes mellitus without complication, with long-term current use of insulin (Multi)     Glucoscan  Metformin  Insulin         Essential hypertension     Monitor blood pressure  Amlodipine  Lisinopril         HLD (hyperlipidemia)     Monitor LFTs, lipids  Statin         Dementia (Multi) - Primary     Assist with ADLs and care.  [facility] records reviewed  Medications, treatments, and labs reviewed  Continue medications and treatments as listed in EMR  Discussed with nursing and therapy      Scribe Attestation  I, Rashad Lloyd   attest that this documentation has been prepared under the direction and in the presence of aMrtín Potts MD    Provider Attestation - Scribe documentation  All medical record entries made by the Scribe were at my direction and personally dictated by me. I have reviewed the chart and agree that the record accurately reflects my personal performance of the history, physical exam, discussion and plan.   Martín Potts MD

## 2024-07-01 NOTE — LETTER
Patient: Homero Stone  : 1940    Encounter Date: 2024    HISTORY & PHYSICAL    Subjective  Chief complaint: Homero Stone is a 83 y.o. male who is being seen and evaluated for multiple medical problems.  Patient admitted to SNF for therapy due to weakness after recent hospitalization.    HPI:  HPI  Patient had presented to ED due to increased confusion.  Patient does have a past history significant for dementia.  Patient had been in nursing facility for rehab and was discharged with family 2 weeks prior to admission.  Family unable to care for him and reported the patient has been noncompliant with medications.  Family would like long-term care placement due to inability to care for self at home.  Patient did have hyperglycemia in the ED, treated with improvement in blood sugars.  Patient was hemodynamically stable to discharge to SNF for continued medical management and therapy and long-term care placement.  Patient seen and examined at the bedside, appears to be in no acute distress.  Denies chest pain, shortness of breath, nausea or vomiting.  Past Medical History:   Diagnosis Date   • BPPV (benign paroxysmal positional vertigo)    • Diabetes mellitus (Multi)    • HLD (hyperlipidemia)    • Hypertension    • Pituitary adenoma (Multi)    • Prostate cancer (Multi)        Past Surgical History:   Procedure Laterality Date   • CT ANGIO NECK  3/2/2017    CT NECK ANGIO W AND WO IV CONTRAST [place] [facility] [place]   • CT HEAD ANGIO W AND WO IV CONTRAST  3/2/2017    CT HEAD ANGIO W AND WO IV CONTRAST [place]2/[place] [facility] [place]       Family History   Problem Relation Name Age of Onset   • No Known Problems Mother     • No Known Problems Father         Social History     Socioeconomic History   • Marital status: Single     Spouse name: Not on file   • Number of children: Not on file   • Years of education: Not on file   • Highest education level: Not on file   Occupational History   • Not on  file   Tobacco Use   • Smoking status: Former     Types: Cigarettes   • Smokeless tobacco: Never   Substance and Sexual Activity   • Alcohol use: Not Currently   • Drug use: Never   • Sexual activity: Defer   Other Topics Concern   • Not on file   Social History Narrative   • Not on file     Social Determinants of Health     Financial Resource Strain: Low Risk  (6/23/2024)    Overall Financial Resource Strain (CARDIA)    • Difficulty of Paying Living Expenses: Not hard at all   Food Insecurity: Not on file   Transportation Needs: No Transportation Needs (6/23/2024)    PRAPARE - Transportation    • Lack of Transportation (Medical): No    • Lack of Transportation (Non-Medical): No   Physical Activity: Not on file   Stress: Not on file   Social Connections: Not on file   Intimate Partner Violence: Not on file   Housing Stability: Low Risk  (6/23/2024)    Housing Stability Vital Sign    • Unable to Pay for Housing in the Last Year: No    • Number of Places Lived in the Last Year: 1    • Unstable Housing in the Last Year: No       Vital signs: 106/64, 97.1, 74, 18, 98%, blood sugar 140    Objective  Physical Exam  Constitutional:       General: He is not in acute distress.  Eyes:      Extraocular Movements: Extraocular movements intact.   Cardiovascular:      Rate and Rhythm: Normal rate and regular rhythm.   Pulmonary:      Effort: Pulmonary effort is normal.      Breath sounds: Normal breath sounds.      Comments: Lungs clear   Abdominal:      General: Bowel sounds are normal.      Palpations: Abdomen is soft.   Musculoskeletal:      Cervical back: Neck supple.      Right lower leg: No edema.      Left lower leg: No edema.   Neurological:      Mental Status: He is alert.   Psychiatric:         Mood and Affect: Mood normal.         Behavior: Behavior is cooperative.         Cognition and Memory: Cognition is impaired. Memory is impaired.         Assessment/Plan  Problem List Items Addressed This Visit       Benign  localized hyperplasia of prostate with urinary obstruction and lower urinary tract symptoms     Monitor  symptoms.  Tamsulosin         Type 2 diabetes mellitus without complication, with long-term current use of insulin (Multi)     Glucoscan  Metformin  Insulin         Essential hypertension     Monitor blood pressure  Amlodipine  Lisinopril         HLD (hyperlipidemia)     Monitor LFTs, lipids  Statin         Dementia (Multi) - Primary     Assist with ADLs and care.  [facility] records reviewed  Medications, treatments, and labs reviewed  Continue medications and treatments as listed in EMR  Discussed with nursing and therapy      Scribe Attestation  I, Rashad Lloyd   attest that this documentation has been prepared under the direction and in the presence of Martín Potts MD    Provider Attestation - Scribe documentation  All medical record entries made by the Scribe were at my direction and personally dictated by me. I have reviewed the chart and agree that the record accurately reflects my personal performance of the history, physical exam, discussion and plan.   Martín Potts MD      Electronically Signed By: Martín Potts MD   7/1/24  4:47 PM

## 2024-07-02 ENCOUNTER — NURSING HOME VISIT (OUTPATIENT)
Dept: POST ACUTE CARE | Facility: EXTERNAL LOCATION | Age: 84
End: 2024-07-02
Payer: COMMERCIAL

## 2024-07-02 DIAGNOSIS — E78.5 HYPERLIPIDEMIA, UNSPECIFIED HYPERLIPIDEMIA TYPE: ICD-10-CM

## 2024-07-02 DIAGNOSIS — Z79.4 TYPE 2 DIABETES MELLITUS WITHOUT COMPLICATION, WITH LONG-TERM CURRENT USE OF INSULIN (MULTI): ICD-10-CM

## 2024-07-02 DIAGNOSIS — I10 ESSENTIAL HYPERTENSION: ICD-10-CM

## 2024-07-02 DIAGNOSIS — N40.1 BENIGN LOCALIZED HYPERPLASIA OF PROSTATE WITH URINARY OBSTRUCTION AND LOWER URINARY TRACT SYMPTOMS: ICD-10-CM

## 2024-07-02 DIAGNOSIS — R53.1 WEAKNESS: Primary | ICD-10-CM

## 2024-07-02 DIAGNOSIS — F03.90 DEMENTIA, UNSPECIFIED DEMENTIA SEVERITY, UNSPECIFIED DEMENTIA TYPE, UNSPECIFIED WHETHER BEHAVIORAL, PSYCHOTIC, OR MOOD DISTURBANCE OR ANXIETY (MULTI): ICD-10-CM

## 2024-07-02 DIAGNOSIS — N13.9 BENIGN LOCALIZED HYPERPLASIA OF PROSTATE WITH URINARY OBSTRUCTION AND LOWER URINARY TRACT SYMPTOMS: ICD-10-CM

## 2024-07-02 DIAGNOSIS — E11.9 TYPE 2 DIABETES MELLITUS WITHOUT COMPLICATION, WITH LONG-TERM CURRENT USE OF INSULIN (MULTI): ICD-10-CM

## 2024-07-02 PROCEDURE — 99309 SBSQ NF CARE MODERATE MDM 30: CPT | Performed by: INTERNAL MEDICINE

## 2024-07-02 NOTE — PROGRESS NOTES
PROGRESS NOTE    Subjective   Chief complaint: Homero Stone is a 83 y.o. male who is an acute skilled patient being seen and evaluated for weakness & monthly general medical care and follow-up.     HPI:  Patient presents for general medical care and f/u.  Patient seen and examined at bedside. Patient has a diagnosis of hyperlipidemia, denies any muscle aches. HTN, denies fatigue, sob, and palpitations. T2DM, Patient denies vision changes, excessive thirst, sweating, urinary frequency. BPH, denies difficulty urinating, weak stream or frequency. Patient has dementia and requires assistance with ADLs. Therapy has been working with the patient to improve strength and endurance with ADLs, transfers, and mobility.  Patient continues to work toward goals.   No issues per nursing.  Patient has no acute complaints.      Objective   Vital signs: 106/64,74,98%,     Physical Exam  Constitutional:       General: He is not in acute distress.  Eyes:      Extraocular Movements: Extraocular movements intact.   Cardiovascular:      Rate and Rhythm: Normal rate and regular rhythm.   Pulmonary:      Effort: Pulmonary effort is normal.      Breath sounds: Normal breath sounds.      Comments: Lungs clear   Abdominal:      General: Bowel sounds are normal.      Palpations: Abdomen is soft.   Musculoskeletal:      Cervical back: Neck supple.      Right lower leg: No edema.      Left lower leg: No edema.   Neurological:      Mental Status: He is alert.   Psychiatric:         Mood and Affect: Mood normal.         Behavior: Behavior is cooperative.         Cognition and Memory: Cognition is impaired. Memory is impaired.         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  Tamsulosin         Type 2 diabetes mellitus without complication, with long-term current use of insulin (Multi)      Glucoscan  Metformin  Insulin         Essential hypertension     Monitor blood pressure  Amlodipine  Lisinopril         HLD (hyperlipidemia)     Monitor LFTs, lipids  Statin         Weakness - Primary     Continue working with therapy           Dementia (Multi)     Assist with ADLs and care.  Aripirazole          Medications, treatments, and labs reviewed  Continue medications and treatments as listed in EMR    Scribe Attestation  Sosa CHATTERJEE Scribe   attest that this documentation has been prepared under the direction and in the presence of Martín Potts MD.     Provider Attestation - Scribe documentation  All medical record entries made by the Scribe were at my direction and personally dictated by me. I have reviewed the chart and agree that the record accurately reflects my personal performance of the history, physical exam, discussion and plan.   Martín Potts MD

## 2024-07-02 NOTE — LETTER
Patient: Homero Stone  : 1940    Encounter Date: 2024    PROGRESS NOTE    Subjective  Chief complaint: Homero Stone is a 83 y.o. male who is an acute skilled patient being seen and evaluated for weakness & monthly general medical care and follow-up.     HPI:  Patient presents for general medical care and f/u.  Patient seen and examined at bedside. Patient has a diagnosis of hyperlipidemia, denies any muscle aches. HTN, denies fatigue, sob, and palpitations. T2DM, Patient denies vision changes, excessive thirst, sweating, urinary frequency. BPH, denies difficulty urinating, weak stream or frequency. Patient has dementia and requires assistance with ADLs. Therapy has been working with the patient to improve strength and endurance with ADLs, transfers, and mobility.  Patient continues to work toward goals.   No issues per nursing.  Patient has no acute complaints.      Objective  Vital signs: 106/64,74,98%,     Physical Exam  Constitutional:       General: He is not in acute distress.  Eyes:      Extraocular Movements: Extraocular movements intact.   Cardiovascular:      Rate and Rhythm: Normal rate and regular rhythm.   Pulmonary:      Effort: Pulmonary effort is normal.      Breath sounds: Normal breath sounds.      Comments: Lungs clear   Abdominal:      General: Bowel sounds are normal.      Palpations: Abdomen is soft.   Musculoskeletal:      Cervical back: Neck supple.      Right lower leg: No edema.      Left lower leg: No edema.   Neurological:      Mental Status: He is alert.   Psychiatric:         Mood and Affect: Mood normal.         Behavior: Behavior is cooperative.         Cognition and Memory: Cognition is impaired. Memory is impaired.         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  Tamsulosin         Type 2 diabetes mellitus  without complication, with long-term current use of insulin (Multi)     Glucoscan  Metformin  Insulin         Essential hypertension     Monitor blood pressure  Amlodipine  Lisinopril         HLD (hyperlipidemia)     Monitor LFTs, lipids  Statin         Weakness - Primary     Continue working with therapy           Dementia (Multi)     Assist with ADLs and care.  Aripirazole          Medications, treatments, and labs reviewed  Continue medications and treatments as listed in EMR    Scribe Attestation  Sosa CHATTERJEE Scribgilda   attest that this documentation has been prepared under the direction and in the presence of Martín Potts MD.     Provider Attestation - Scribe documentation  All medical record entries made by the Scribe were at my direction and personally dictated by me. I have reviewed the chart and agree that the record accurately reflects my personal performance of the history, physical exam, discussion and plan.   Martín Potts MD      Electronically Signed By: Martín Potts MD   7/2/24  1:54 PM

## 2024-07-04 ENCOUNTER — NURSING HOME VISIT (OUTPATIENT)
Dept: POST ACUTE CARE | Facility: EXTERNAL LOCATION | Age: 84
End: 2024-07-04
Payer: COMMERCIAL

## 2024-07-04 DIAGNOSIS — F03.90 DEMENTIA, UNSPECIFIED DEMENTIA SEVERITY, UNSPECIFIED DEMENTIA TYPE, UNSPECIFIED WHETHER BEHAVIORAL, PSYCHOTIC, OR MOOD DISTURBANCE OR ANXIETY (MULTI): ICD-10-CM

## 2024-07-04 DIAGNOSIS — R53.1 WEAKNESS: ICD-10-CM

## 2024-07-04 PROCEDURE — 99308 SBSQ NF CARE LOW MDM 20: CPT | Performed by: INTERNAL MEDICINE

## 2024-07-04 NOTE — LETTER
Patient: Homero Stone  : 1940    Encounter Date: 2024    PROGRESS NOTE    Subjective  Chief complaint: Homero Stone is a 83 y.o. male who is an acute skilled patient being seen and evaluated for weakness    HPI:  HPI Patient continues working in therapy due to weakness. He is working with speech for cognition and problem solving. Ambulates within facility with supervision, without assistive device. No new issues at this time. No acute distress.   Objective  Vital signs: 134/76, 985    Physical Exam  Constitutional:       General: He is not in acute distress.  Eyes:      Extraocular Movements: Extraocular movements intact.   Cardiovascular:      Rate and Rhythm: Normal rate and regular rhythm.   Pulmonary:      Effort: Pulmonary effort is normal.      Breath sounds: Normal breath sounds.   Abdominal:      General: Bowel sounds are normal.      Palpations: Abdomen is soft.   Musculoskeletal:      Cervical back: Neck supple.      Right lower leg: No edema.      Left lower leg: No edema.   Neurological:      Mental Status: He is alert.   Psychiatric:         Mood and Affect: Mood normal.         Behavior: Behavior is cooperative.         Assessment/Plan  Problem List Items Addressed This Visit       Weakness     Continue working with therapy           Dementia (Multi)     Assist with ADLs and care.  Aripirazole  Continue working with ST        An interactive audio and/or video telecommunication system which permits real time communications between the patient (at the originating site) and provider (at a distant site) was utilized to provide this telehealth service after obtaining verbal consent.    Medications, treatments, and labs reviewed  Continue medications and treatments as listed in PCC    Scribe Attestation  By signing my name below, IAgatha, Scribe   attest that this documentation has been prepared under the direction and in the presence of Martín Potts MD.    Provider  Attestation - Scribe documentation  All medical record entries made by the Scribe were at my direction and personally dictated by me. I have reviewed the chart and agree that the record accurately reflects my personal performance of the history, physical exam, discussion and plan.      Electronically Signed By: Martín Potts MD   7/8/24  6:31 PM

## 2024-07-05 ENCOUNTER — NURSING HOME VISIT (OUTPATIENT)
Dept: POST ACUTE CARE | Facility: EXTERNAL LOCATION | Age: 84
End: 2024-07-05
Payer: COMMERCIAL

## 2024-07-05 DIAGNOSIS — F03.90 DEMENTIA, UNSPECIFIED DEMENTIA SEVERITY, UNSPECIFIED DEMENTIA TYPE, UNSPECIFIED WHETHER BEHAVIORAL, PSYCHOTIC, OR MOOD DISTURBANCE OR ANXIETY (MULTI): ICD-10-CM

## 2024-07-05 DIAGNOSIS — I10 ESSENTIAL HYPERTENSION: ICD-10-CM

## 2024-07-05 DIAGNOSIS — E11.9 TYPE 2 DIABETES MELLITUS WITHOUT COMPLICATION, WITH LONG-TERM CURRENT USE OF INSULIN (MULTI): ICD-10-CM

## 2024-07-05 DIAGNOSIS — Z79.4 TYPE 2 DIABETES MELLITUS WITHOUT COMPLICATION, WITH LONG-TERM CURRENT USE OF INSULIN (MULTI): ICD-10-CM

## 2024-07-05 DIAGNOSIS — R53.1 WEAKNESS: Primary | ICD-10-CM

## 2024-07-05 PROCEDURE — 99309 SBSQ NF CARE MODERATE MDM 30: CPT | Performed by: NURSE PRACTITIONER

## 2024-07-05 NOTE — LETTER
Patient: Homero Stone  : 1940    Encounter Date: 2024    PROGRESS NOTE    Subjective  Chief complaint: Homero Stone is a 83 y.o. male who is a long term care patient being seen and evaluated for blood sugar levels     HPI: Pleasant and talkative,. Reports that he is eating well. Reviewed current blood sugars with nursing. Has had no episode of hypoglycemia   Remains in therapy. The therapist reports that he is making good progress.   HPI      Objective  Vital signs: 114/69-82-18-98.3     Physical Exam  Vitals and nursing note reviewed.   Constitutional:       General: He is not in acute distress.  Eyes:      Extraocular Movements: Extraocular movements intact.   Cardiovascular:      Rate and Rhythm: Normal rate and regular rhythm.   Pulmonary:      Effort: Pulmonary effort is normal.      Breath sounds: Normal breath sounds.      Comments: Lungs clear   Abdominal:      General: Bowel sounds are normal.      Palpations: Abdomen is soft.   Musculoskeletal:      Cervical back: Neck supple.      Right lower leg: No edema.      Left lower leg: No edema.   Neurological:      Mental Status: He is alert.   Psychiatric:         Mood and Affect: Mood normal.         Behavior: Behavior is cooperative.         Cognition and Memory: Cognition is impaired. Memory is impaired.         Assessment/Plan  Problem List Items Addressed This Visit       Type 2 diabetes mellitus without complication, with long-term current use of insulin (Multi)     Glucoscan  Metformin  Insulin  No hypoglycemia          Essential hypertension     Monitor blood pressure  Amlodipine  Lisinopril         Weakness - Primary     Continue working with therapy           Dementia (Multi)     Assist with ADLs and care.  Aripirazole          Medications, treatments, and labs reviewed  Continue medications and treatments as listed in EMR    COREY Roche      Electronically Signed By: COREY Roche   24  2:50 PM

## 2024-07-06 NOTE — PROGRESS NOTES
PROGRESS NOTE    Subjective   Chief complaint: Homero Stone is a 83 y.o. male who is a long term care patient being seen and evaluated for blood sugar levels     HPI: Pleasant and talkative,. Reports that he is eating well. Reviewed current blood sugars with nursing. Has had no episode of hypoglycemia   Remains in therapy. The therapist reports that he is making good progress.   HPI      Objective   Vital signs: 114/69-82-18-98.3     Physical Exam  Vitals and nursing note reviewed.   Constitutional:       General: He is not in acute distress.  Eyes:      Extraocular Movements: Extraocular movements intact.   Cardiovascular:      Rate and Rhythm: Normal rate and regular rhythm.   Pulmonary:      Effort: Pulmonary effort is normal.      Breath sounds: Normal breath sounds.      Comments: Lungs clear   Abdominal:      General: Bowel sounds are normal.      Palpations: Abdomen is soft.   Musculoskeletal:      Cervical back: Neck supple.      Right lower leg: No edema.      Left lower leg: No edema.   Neurological:      Mental Status: He is alert.   Psychiatric:         Mood and Affect: Mood normal.         Behavior: Behavior is cooperative.         Cognition and Memory: Cognition is impaired. Memory is impaired.         Assessment/Plan   Problem List Items Addressed This Visit       Type 2 diabetes mellitus without complication, with long-term current use of insulin (Multi)     Glucoscan  Metformin  Insulin  No hypoglycemia          Essential hypertension     Monitor blood pressure  Amlodipine  Lisinopril         Weakness - Primary     Continue working with therapy           Dementia (Multi)     Assist with ADLs and care.  Aripirazole          Medications, treatments, and labs reviewed  Continue medications and treatments as listed in EMR    Yocasta Diaz, APRN-CNP

## 2024-07-08 ENCOUNTER — NURSING HOME VISIT (OUTPATIENT)
Dept: POST ACUTE CARE | Facility: EXTERNAL LOCATION | Age: 84
End: 2024-07-08
Payer: COMMERCIAL

## 2024-07-08 DIAGNOSIS — Z79.4 TYPE 2 DIABETES MELLITUS WITHOUT COMPLICATION, WITH LONG-TERM CURRENT USE OF INSULIN (MULTI): ICD-10-CM

## 2024-07-08 DIAGNOSIS — F03.90 DEMENTIA, UNSPECIFIED DEMENTIA SEVERITY, UNSPECIFIED DEMENTIA TYPE, UNSPECIFIED WHETHER BEHAVIORAL, PSYCHOTIC, OR MOOD DISTURBANCE OR ANXIETY (MULTI): Primary | ICD-10-CM

## 2024-07-08 DIAGNOSIS — R53.1 WEAKNESS: ICD-10-CM

## 2024-07-08 DIAGNOSIS — E11.9 TYPE 2 DIABETES MELLITUS WITHOUT COMPLICATION, WITH LONG-TERM CURRENT USE OF INSULIN (MULTI): ICD-10-CM

## 2024-07-08 DIAGNOSIS — I10 ESSENTIAL HYPERTENSION: ICD-10-CM

## 2024-07-08 PROCEDURE — 99308 SBSQ NF CARE LOW MDM 20: CPT | Performed by: INTERNAL MEDICINE

## 2024-07-08 NOTE — PROGRESS NOTES
PROGRESS NOTE    Subjective   Chief complaint: Homero Stone is a 83 y.o. male who is an acute skilled patient being seen and evaluated for weakness    HPI:  HPI Patient continues working in therapy due to weakness. He is working with speech for cognition and problem solving. Ambulates within facility with supervision, without assistive device. No new issues at this time. No acute distress.   Objective   Vital signs: 134/76, 985    Physical Exam  Constitutional:       General: He is not in acute distress.  Eyes:      Extraocular Movements: Extraocular movements intact.   Cardiovascular:      Rate and Rhythm: Normal rate and regular rhythm.   Pulmonary:      Effort: Pulmonary effort is normal.      Breath sounds: Normal breath sounds.   Abdominal:      General: Bowel sounds are normal.      Palpations: Abdomen is soft.   Musculoskeletal:      Cervical back: Neck supple.      Right lower leg: No edema.      Left lower leg: No edema.   Neurological:      Mental Status: He is alert.   Psychiatric:         Mood and Affect: Mood normal.         Behavior: Behavior is cooperative.         Assessment/Plan   Problem List Items Addressed This Visit       Weakness     Continue working with therapy           Dementia (Multi)     Assist with ADLs and care.  Aripirazole  Continue working with ST        An interactive audio and/or video telecommunication system which permits real time communications between the patient (at the originating site) and provider (at a distant site) was utilized to provide this telehealth service after obtaining verbal consent.    Medications, treatments, and labs reviewed  Continue medications and treatments as listed in P[facility]    Scribe Attestation  By signing my name below, Agatha CHATTERJEE, Scribgilda   attest that this documentation has been prepared under the direction and in the presence of Martín Potts MD.    Provider Attestation - Scribe documentation  All medical record entries made by the  Scribe were at my direction and personally dictated by me. I have reviewed the chart and agree that the record accurately reflects my personal performance of the history, physical exam, discussion and plan.

## 2024-07-08 NOTE — LETTER
Patient: Homero Stone  : 1940    Encounter Date: 2024    PROGRESS NOTE    Subjective  Chief complaint: Homero Stone is a 83 y.o. male who is an acute skilled patient being seen and evaluated for weakness    HPI:  Patient has been working in therapy to improve strength, endurance, and ADLs.  Patient continues to work toward goals. He is ambulating with no assistive device with mod assist. Requires mod\max assist to complete ADLs and mod assist with transfers.  No new concerns today.  Denies n/v/f/c pain.        Objective  Vital signs: 122/73,72,97%,     Physical Exam  Vitals and nursing note reviewed.   Constitutional:       General: He is not in acute distress.  Eyes:      Extraocular Movements: Extraocular movements intact.   Cardiovascular:      Rate and Rhythm: Normal rate and regular rhythm.   Pulmonary:      Effort: Pulmonary effort is normal.      Breath sounds: Normal breath sounds.      Comments: Lungs clear   Abdominal:      General: Bowel sounds are normal.      Palpations: Abdomen is soft.   Musculoskeletal:      Cervical back: Neck supple.      Right lower leg: No edema.      Left lower leg: No edema.   Neurological:      Mental Status: He is alert.   Psychiatric:         Mood and Affect: Mood normal.         Behavior: Behavior is cooperative.         Cognition and Memory: Cognition is impaired. Memory is impaired.         Assessment/Plan  Problem List Items Addressed This Visit       Type 2 diabetes mellitus without complication, with long-term current use of insulin (Multi)     Glucoscan  Metformin  Insulin  No hypoglycemia          Essential hypertension     Monitor blood pressure  Amlodipine  Lisinopril         Weakness     Continue working with therapy           Dementia (Multi) - Primary     Assist with ADLs and care.  Aripirazole  Continue working with ST          Medications, treatments, and labs reviewed  Continue medications and treatments as listed in  EMR    Scribe Attestation  I, Rashad Parker   attest that this documentation has been prepared under the direction and in the presence of Martín Potts MD.     Provider Attestation - Scribe documentation  All medical record entries made by the Scribe were at my direction and personally dictated by me. I have reviewed the chart and agree that the record accurately reflects my personal performance of the history, physical exam, discussion and plan.   Martín Potts MD      Electronically Signed By: Martín Potts MD   7/10/24  2:44 PM

## 2024-07-09 ENCOUNTER — NURSING HOME VISIT (OUTPATIENT)
Dept: POST ACUTE CARE | Facility: EXTERNAL LOCATION | Age: 84
End: 2024-07-09
Payer: COMMERCIAL

## 2024-07-09 DIAGNOSIS — F03.90 DEMENTIA, UNSPECIFIED DEMENTIA SEVERITY, UNSPECIFIED DEMENTIA TYPE, UNSPECIFIED WHETHER BEHAVIORAL, PSYCHOTIC, OR MOOD DISTURBANCE OR ANXIETY (MULTI): ICD-10-CM

## 2024-07-09 DIAGNOSIS — N40.1 BENIGN LOCALIZED HYPERPLASIA OF PROSTATE WITH URINARY OBSTRUCTION AND LOWER URINARY TRACT SYMPTOMS: ICD-10-CM

## 2024-07-09 DIAGNOSIS — E11.9 TYPE 2 DIABETES MELLITUS WITHOUT COMPLICATION, WITH LONG-TERM CURRENT USE OF INSULIN (MULTI): ICD-10-CM

## 2024-07-09 DIAGNOSIS — I10 ESSENTIAL HYPERTENSION: ICD-10-CM

## 2024-07-09 DIAGNOSIS — Z79.4 TYPE 2 DIABETES MELLITUS WITHOUT COMPLICATION, WITH LONG-TERM CURRENT USE OF INSULIN (MULTI): ICD-10-CM

## 2024-07-09 DIAGNOSIS — N13.9 BENIGN LOCALIZED HYPERPLASIA OF PROSTATE WITH URINARY OBSTRUCTION AND LOWER URINARY TRACT SYMPTOMS: ICD-10-CM

## 2024-07-09 DIAGNOSIS — R53.1 WEAKNESS: Primary | ICD-10-CM

## 2024-07-09 PROCEDURE — 99308 SBSQ NF CARE LOW MDM 20: CPT | Performed by: INTERNAL MEDICINE

## 2024-07-09 NOTE — PROGRESS NOTES
PROGRESS NOTE    Subjective   Chief complaint: Homero Stone is a 83 y.o. male who is an acute skilled patient being seen and evaluated for weakness    HPI:  Patient has been working in therapy to improve strength, endurance, and ADLs.  Patient continues to work toward goals. He is ambulating with no assistive device with mod assist. Requires mod\max assist to complete ADLs and mod assist with transfers.  No new concerns today.  Denies n/v/f/c pain.        Objective   Vital signs: 122/73,72,97%,     Physical Exam  Vitals and nursing note reviewed.   Constitutional:       General: He is not in acute distress.  Eyes:      Extraocular Movements: Extraocular movements intact.   Cardiovascular:      Rate and Rhythm: Normal rate and regular rhythm.   Pulmonary:      Effort: Pulmonary effort is normal.      Breath sounds: Normal breath sounds.      Comments: Lungs clear   Abdominal:      General: Bowel sounds are normal.      Palpations: Abdomen is soft.   Musculoskeletal:      Cervical back: Neck supple.      Right lower leg: No edema.      Left lower leg: No edema.   Neurological:      Mental Status: He is alert.   Psychiatric:         Mood and Affect: Mood normal.         Behavior: Behavior is cooperative.         Cognition and Memory: Cognition is impaired. Memory is impaired.         Assessment/Plan   Problem List Items Addressed This Visit       Type 2 diabetes mellitus without complication, with long-term current use of insulin (Multi)     Glucoscan  Metformin  Insulin  No hypoglycemia          Essential hypertension     Monitor blood pressure  Amlodipine  Lisinopril         Weakness     Continue working with therapy           Dementia (Multi) - Primary     Assist with ADLs and care.  Aripirazole  Continue working with ST          Medications, treatments, and labs reviewed  Continue medications and treatments as listed in EMR    Scribe Attestation  I, Rashad Parker   attest that this  documentation has been prepared under the direction and in the presence of Martín Potts MD.     Provider Attestation - Scribe documentation  All medical record entries made by the Scribe were at my direction and personally dictated by me. I have reviewed the chart and agree that the record accurately reflects my personal performance of the history, physical exam, discussion and plan.   Martín Potts MD

## 2024-07-09 NOTE — LETTER
Patient: Homero Stone  : 1940    Encounter Date: 2024    PROGRESS NOTE    Subjective  Chief complaint: Homero Stone is a 83 y.o. male who is an acute skilled patient being seen and evaluated for weakness    HPI:  HPI  Patient is continue to work in therapy.  Patient is progressing towards established goals.  Therapy is working with patient on gait training, patient is able to ambulate ad ja. with no assistive device and moderate independence.  Patient is working on ADL tasks for upper and lower body requiring mod to max assist.  Patient was seen and examined at the bedside, appears to be in no acute distress.  Nursing staff voicing no new concerns.    Objective  Vital signs: 122/73, 97.3, 72, 18, 97%, blood sugar 130    Physical Exam  Constitutional:       General: He is not in acute distress.  Eyes:      Extraocular Movements: Extraocular movements intact.   Cardiovascular:      Rate and Rhythm: Normal rate and regular rhythm.   Pulmonary:      Effort: Pulmonary effort is normal.      Breath sounds: Normal breath sounds.   Abdominal:      General: Bowel sounds are normal.      Palpations: Abdomen is soft.   Musculoskeletal:      Cervical back: Neck supple.      Right lower leg: No edema.      Left lower leg: No edema.   Neurological:      Mental Status: He is alert.      Motor: Weakness present.   Psychiatric:         Mood and Affect: Mood normal.         Behavior: Behavior is cooperative.         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  Tamsulosin         Type 2 diabetes mellitus without complication, with long-term current use of insulin (Multi)     Glucoscan  Metformin  Insulin         Essential hypertension     Monitor blood pressure  Amlodipine  Lisinopril         Weakness - Primary     Continue progressing towards established goals          Dementia (Multi)     Assist with ADLs and care.  Aripirazole          Medications, treatments, and labs reviewed  Continue medications and treatments as listed in EMR      Scribe Attestation  I, Rashad Lloyd   attest that this documentation has been prepared under the direction and in the presence of Martín Potts MD    Provider Attestation - Scribe documentation  All medical record entries made by the Scribe were at my direction and personally dictated by me. I have reviewed the chart and agree that the record accurately reflects my personal performance of the history, physical exam, discussion and plan.   Martín Potts MD        Electronically Signed By: Martín Potts MD   7/11/24  3:57 PM

## 2024-07-11 ENCOUNTER — NURSING HOME VISIT (OUTPATIENT)
Dept: POST ACUTE CARE | Facility: EXTERNAL LOCATION | Age: 84
End: 2024-07-11
Payer: COMMERCIAL

## 2024-07-11 DIAGNOSIS — R53.1 WEAKNESS: Primary | ICD-10-CM

## 2024-07-11 DIAGNOSIS — Z79.4 TYPE 2 DIABETES MELLITUS WITHOUT COMPLICATION, WITH LONG-TERM CURRENT USE OF INSULIN (MULTI): ICD-10-CM

## 2024-07-11 DIAGNOSIS — F03.90 DEMENTIA, UNSPECIFIED DEMENTIA SEVERITY, UNSPECIFIED DEMENTIA TYPE, UNSPECIFIED WHETHER BEHAVIORAL, PSYCHOTIC, OR MOOD DISTURBANCE OR ANXIETY (MULTI): ICD-10-CM

## 2024-07-11 DIAGNOSIS — I10 ESSENTIAL HYPERTENSION: ICD-10-CM

## 2024-07-11 DIAGNOSIS — E11.9 TYPE 2 DIABETES MELLITUS WITHOUT COMPLICATION, WITH LONG-TERM CURRENT USE OF INSULIN (MULTI): ICD-10-CM

## 2024-07-11 PROCEDURE — 99308 SBSQ NF CARE LOW MDM 20: CPT | Performed by: INTERNAL MEDICINE

## 2024-07-11 NOTE — PROGRESS NOTES
PROGRESS NOTE    Subjective   Chief complaint: Homero Stone is a 83 y.o. male who is an acute skilled patient being seen and evaluated for weakness    HPI:  HPI  Patient does continue to work in therapy due to generalized weakness.  Therapy is working with patient on gait training, ambulating ad aj. with no assistive device.  Patient is working on safe transfers from various surfaces, completing with moderate independence.  Patient is also working on ADL retraining.  Patient seen and examined at the bedside, appears to be in no acute distress.  Nursing staff voicing no new concerns.    Objective   Vital signs: 122/73, 97.3, 72, 18, 97%, blood sugar 139    Physical Exam  Constitutional:       General: He is not in acute distress.  Eyes:      Extraocular Movements: Extraocular movements intact.   Cardiovascular:      Rate and Rhythm: Normal rate and regular rhythm.   Pulmonary:      Effort: Pulmonary effort is normal.      Breath sounds: Normal breath sounds.      Comments: Lungs clear   Abdominal:      General: Bowel sounds are normal.      Palpations: Abdomen is soft.   Musculoskeletal:      Cervical back: Neck supple.      Right lower leg: No edema.      Left lower leg: No edema.   Neurological:      Mental Status: He is alert.   Psychiatric:         Mood and Affect: Mood normal.         Behavior: Behavior is cooperative.         Cognition and Memory: Cognition is impaired. Memory is impaired.         Assessment/Plan   Problem List Items Addressed This Visit       Type 2 diabetes mellitus without complication, with long-term current use of insulin (Multi)     Glucoscan  Metformin  Insulin         Essential hypertension     Monitor blood pressure  Amlodipine  Lisinopril         Weakness - Primary     Continue working towards established goals         Dementia (Multi)     Assist with ADLs and care.  Aripirazole          Medications, treatments, and labs reviewed  Continue medications and treatments as listed  in EMR      Scribe Attestation  I, Rashad Lloyd   attest that this documentation has been prepared under the direction and in the presence of Martín Potts MD    Provider Attestation - Scribe documentation  All medical record entries made by the Scribe were at my direction and personally dictated by me. I have reviewed the chart and agree that the record accurately reflects my personal performance of the history, physical exam, discussion and plan.   Martín Potts MD

## 2024-07-11 NOTE — PROGRESS NOTES
PROGRESS NOTE    Subjective   Chief complaint: Homero Stone is a 83 y.o. male who is an acute skilled patient being seen and evaluated for weakness    HPI:  HPI  Patient is continue to work in therapy.  Patient is progressing towards established goals.  Therapy is working with patient on gait training, patient is able to ambulate ad aj. with no assistive device and moderate independence.  Patient is working on ADL tasks for upper and lower body requiring mod to max assist.  Patient was seen and examined at the bedside, appears to be in no acute distress.  Nursing staff voicing no new concerns.    Objective   Vital signs: 122/73, 97.3, 72, 18, 97%, blood sugar 130    Physical Exam  Constitutional:       General: He is not in acute distress.  Eyes:      Extraocular Movements: Extraocular movements intact.   Cardiovascular:      Rate and Rhythm: Normal rate and regular rhythm.   Pulmonary:      Effort: Pulmonary effort is normal.      Breath sounds: Normal breath sounds.   Abdominal:      General: Bowel sounds are normal.      Palpations: Abdomen is soft.   Musculoskeletal:      Cervical back: Neck supple.      Right lower leg: No edema.      Left lower leg: No edema.   Neurological:      Mental Status: He is alert.      Motor: Weakness present.   Psychiatric:         Mood and Affect: Mood normal.         Behavior: Behavior is cooperative.         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  Tamsulosin         Type 2 diabetes mellitus without complication, with long-term current use of insulin (Multi)     Glucoscan  Metformin  Insulin         Essential hypertension     Monitor blood pressure  Amlodipine  Lisinopril         Weakness - Primary     Continue progressing towards established goals         Dementia (Multi)     Assist with ADLs and care.  Aripirazole           Medications, treatments, and labs reviewed  Continue medications and treatments as listed in EMR      Scribe Attestation  I, Rashad Lloyd   attest that this documentation has been prepared under the direction and in the presence of Martín Potts MD    Provider Attestation - Scribe documentation  All medical record entries made by the Scribe were at my direction and personally dictated by me. I have reviewed the chart and agree that the record accurately reflects my personal performance of the history, physical exam, discussion and plan.   Martín Potts MD

## 2024-07-11 NOTE — LETTER
Patient: Homero Stone  : 1940    Encounter Date: 2024    PROGRESS NOTE    Subjective  Chief complaint: Homero Stone is a 83 y.o. male who is an acute skilled patient being seen and evaluated for weakness    HPI:  HPI  Patient does continue to work in therapy due to generalized weakness.  Therapy is working with patient on gait training, ambulating ad aj. with no assistive device.  Patient is working on safe transfers from various surfaces, completing with moderate independence.  Patient is also working on ADL retraining.  Patient seen and examined at the bedside, appears to be in no acute distress.  Nursing staff voicing no new concerns.    Objective  Vital signs: 122/73, 97.3, 72, 18, 97%, blood sugar 139    Physical Exam  Constitutional:       General: He is not in acute distress.  Eyes:      Extraocular Movements: Extraocular movements intact.   Cardiovascular:      Rate and Rhythm: Normal rate and regular rhythm.   Pulmonary:      Effort: Pulmonary effort is normal.      Breath sounds: Normal breath sounds.      Comments: Lungs clear   Abdominal:      General: Bowel sounds are normal.      Palpations: Abdomen is soft.   Musculoskeletal:      Cervical back: Neck supple.      Right lower leg: No edema.      Left lower leg: No edema.   Neurological:      Mental Status: He is alert.   Psychiatric:         Mood and Affect: Mood normal.         Behavior: Behavior is cooperative.         Cognition and Memory: Cognition is impaired. Memory is impaired.         Assessment/Plan  Problem List Items Addressed This Visit       Type 2 diabetes mellitus without complication, with long-term current use of insulin (Multi)     Glucoscan  Metformin  Insulin         Essential hypertension     Monitor blood pressure  Amlodipine  Lisinopril         Weakness - Primary     Continue working towards established goals         Dementia (Multi)     Assist with ADLs and care.  Aripirazole          Medications,  treatments, and labs reviewed  Continue medications and treatments as listed in EMR      Scribe Attestation  I, Rashad Lloyd   attest that this documentation has been prepared under the direction and in the presence of Martín Potts MD    Provider Attestation - Scribe documentation  All medical record entries made by the Scribe were at my direction and personally dictated by me. I have reviewed the chart and agree that the record accurately reflects my personal performance of the history, physical exam, discussion and plan.   Martín Potst MD        Electronically Signed By: Martín Potts MD   7/11/24  2:54 PM

## 2024-07-15 ENCOUNTER — NURSING HOME VISIT (OUTPATIENT)
Dept: POST ACUTE CARE | Facility: EXTERNAL LOCATION | Age: 84
End: 2024-07-15
Payer: COMMERCIAL

## 2024-07-15 DIAGNOSIS — N40.1 BENIGN LOCALIZED HYPERPLASIA OF PROSTATE WITH URINARY OBSTRUCTION AND LOWER URINARY TRACT SYMPTOMS: ICD-10-CM

## 2024-07-15 DIAGNOSIS — N13.9 BENIGN LOCALIZED HYPERPLASIA OF PROSTATE WITH URINARY OBSTRUCTION AND LOWER URINARY TRACT SYMPTOMS: ICD-10-CM

## 2024-07-15 DIAGNOSIS — E11.9 TYPE 2 DIABETES MELLITUS WITHOUT COMPLICATION, WITH LONG-TERM CURRENT USE OF INSULIN (MULTI): ICD-10-CM

## 2024-07-15 DIAGNOSIS — F03.90 DEMENTIA, UNSPECIFIED DEMENTIA SEVERITY, UNSPECIFIED DEMENTIA TYPE, UNSPECIFIED WHETHER BEHAVIORAL, PSYCHOTIC, OR MOOD DISTURBANCE OR ANXIETY (MULTI): ICD-10-CM

## 2024-07-15 DIAGNOSIS — I10 ESSENTIAL HYPERTENSION: ICD-10-CM

## 2024-07-15 DIAGNOSIS — Z79.4 TYPE 2 DIABETES MELLITUS WITHOUT COMPLICATION, WITH LONG-TERM CURRENT USE OF INSULIN (MULTI): ICD-10-CM

## 2024-07-15 DIAGNOSIS — R53.1 WEAKNESS: Primary | ICD-10-CM

## 2024-07-15 PROCEDURE — 99308 SBSQ NF CARE LOW MDM 20: CPT | Performed by: INTERNAL MEDICINE

## 2024-07-15 NOTE — PROGRESS NOTES
PROGRESS NOTE    Subjective   Chief complaint: Homero Stone is a 83 y.o. male who is an acute skilled patient being seen and evaluated for weakness    HPI:  Patient presents for f/u therapy and general medical care.  Patient seen and examined at beside.  Therapy has been working with the patient to improve strength, endurance, ADLs, and transfers d/t generalized weakness. Ambulating with mod assist and no assistive device. Tranfers with mod assist also. Patient has no acute concerns today.      Objective   Vital signs: 122/73,72,97%,     Physical Exam  Constitutional:       General: He is not in acute distress.  Eyes:      Extraocular Movements: Extraocular movements intact.   Cardiovascular:      Rate and Rhythm: Normal rate and regular rhythm.   Pulmonary:      Effort: Pulmonary effort is normal.      Breath sounds: Normal breath sounds.      Comments: Lungs clear   Abdominal:      General: Bowel sounds are normal.      Palpations: Abdomen is soft.   Musculoskeletal:      Cervical back: Neck supple.      Right lower leg: No edema.      Left lower leg: No edema.   Neurological:      Mental Status: He is alert.   Psychiatric:         Mood and Affect: Mood normal.         Behavior: Behavior is cooperative.         Cognition and Memory: Cognition is impaired. Memory is impaired.         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  Tamsulosin         Type 2 diabetes mellitus without complication, with long-term current use of insulin (Multi)     Glucoscan  Metformin  Insulin         Essential hypertension     Monitor blood pressure  Amlodipine  Lisinopril         Weakness - Primary     Continue progressing towards established goals         Dementia (Multi)     Assist with ADLs and care.  Aripirazole          Medications, treatments, and labs reviewed  Continue  medications and treatments as listed in EMR    Scribe Attestation  I, Rashad Parker   attest that this documentation has been prepared under the direction and in the presence of Martín Potts MD.     Provider Attestation - Scribe documentation  All medical record entries made by the Scribe were at my direction and personally dictated by me. I have reviewed the chart and agree that the record accurately reflects my personal performance of the history, physical exam, discussion and plan.   Martín Potts MD

## 2024-07-16 ENCOUNTER — NURSING HOME VISIT (OUTPATIENT)
Dept: POST ACUTE CARE | Facility: EXTERNAL LOCATION | Age: 84
End: 2024-07-16
Payer: COMMERCIAL

## 2024-07-16 DIAGNOSIS — R53.1 WEAKNESS: ICD-10-CM

## 2024-07-16 DIAGNOSIS — E11.9 TYPE 2 DIABETES MELLITUS WITHOUT COMPLICATION, WITH LONG-TERM CURRENT USE OF INSULIN (MULTI): ICD-10-CM

## 2024-07-16 DIAGNOSIS — F03.90 DEMENTIA, UNSPECIFIED DEMENTIA SEVERITY, UNSPECIFIED DEMENTIA TYPE, UNSPECIFIED WHETHER BEHAVIORAL, PSYCHOTIC, OR MOOD DISTURBANCE OR ANXIETY (MULTI): Primary | ICD-10-CM

## 2024-07-16 DIAGNOSIS — I10 ESSENTIAL HYPERTENSION: ICD-10-CM

## 2024-07-16 DIAGNOSIS — Z79.4 TYPE 2 DIABETES MELLITUS WITHOUT COMPLICATION, WITH LONG-TERM CURRENT USE OF INSULIN (MULTI): ICD-10-CM

## 2024-07-16 PROCEDURE — 99308 SBSQ NF CARE LOW MDM 20: CPT | Performed by: INTERNAL MEDICINE

## 2024-07-16 NOTE — LETTER
Patient: Homero Stone  : 1940    Encounter Date: 2024    PROGRESS NOTE    Subjective  Chief complaint: Homero Stone is a 83 y.o. male who is an acute skilled patient being seen and evaluated for weakness    HPI:  Therapy has been working with the patient to improve strength and endurance with ADLs, transfers, and mobility.  Patient continues to work toward goals. Continues up ad aj with no assistive device. Transferring with mod assist and completing ADLs with mod\max assist.  Patient is stable and has no new complaints.  Nursing staff voices no new concerns today.      Objective  Vital signs: 154/69,72,97%,     Physical Exam  Constitutional:       General: He is not in acute distress.  Eyes:      Extraocular Movements: Extraocular movements intact.   Cardiovascular:      Rate and Rhythm: Normal rate and regular rhythm.   Pulmonary:      Effort: Pulmonary effort is normal.      Breath sounds: Normal breath sounds.      Comments: Lungs clear   Abdominal:      General: Bowel sounds are normal.      Palpations: Abdomen is soft.   Musculoskeletal:      Cervical back: Neck supple.      Right lower leg: No edema.      Left lower leg: No edema.   Neurological:      Mental Status: He is alert.   Psychiatric:         Mood and Affect: Mood normal.         Behavior: Behavior is cooperative.         Cognition and Memory: Cognition is impaired. Memory is impaired.         Assessment/Plan  Problem List Items Addressed This Visit       Type 2 diabetes mellitus without complication, with long-term current use of insulin (Multi)     Glucoscan  Metformin  Insulin         Essential hypertension     Monitor blood pressure  Amlodipine  Lisinopril         Weakness     Continue progressing towards established goals         Dementia (Multi) - Primary     Assist with ADLs and care.  Aripirazole          Medications, treatments, and labs reviewed  Continue medications and treatments as listed in EMR    Scribe  Attestation  I, Rashad Parker   attest that this documentation has been prepared under the direction and in the presence of Martín Potts MD.     Provider Attestation - Scribe documentation  All medical record entries made by the Scribe were at my direction and personally dictated by me. I have reviewed the chart and agree that the record accurately reflects my personal performance of the history, physical exam, discussion and plan.   Martín Potts MD      Electronically Signed By: Martín Potts MD   7/16/24 12:34 PM

## 2024-07-16 NOTE — PROGRESS NOTES
PROGRESS NOTE    Subjective   Chief complaint: Homero Stone is a 83 y.o. male who is an acute skilled patient being seen and evaluated for weakness    HPI:  Therapy has been working with the patient to improve strength and endurance with ADLs, transfers, and mobility.  Patient continues to work toward goals. Continues up ad aj with no assistive device. Transferring with mod assist and completing ADLs with mod\max assist.  Patient is stable and has no new complaints.  Nursing staff voices no new concerns today.      Objective   Vital signs: 154/69,72,97%,     Physical Exam  Constitutional:       General: He is not in acute distress.  Eyes:      Extraocular Movements: Extraocular movements intact.   Cardiovascular:      Rate and Rhythm: Normal rate and regular rhythm.   Pulmonary:      Effort: Pulmonary effort is normal.      Breath sounds: Normal breath sounds.      Comments: Lungs clear   Abdominal:      General: Bowel sounds are normal.      Palpations: Abdomen is soft.   Musculoskeletal:      Cervical back: Neck supple.      Right lower leg: No edema.      Left lower leg: No edema.   Neurological:      Mental Status: He is alert.   Psychiatric:         Mood and Affect: Mood normal.         Behavior: Behavior is cooperative.         Cognition and Memory: Cognition is impaired. Memory is impaired.         Assessment/Plan   Problem List Items Addressed This Visit       Type 2 diabetes mellitus without complication, with long-term current use of insulin (Multi)     Glucoscan  Metformin  Insulin         Essential hypertension     Monitor blood pressure  Amlodipine  Lisinopril         Weakness     Continue progressing towards established goals         Dementia (Multi) - Primary     Assist with ADLs and care.  Aripirazole          Medications, treatments, and labs reviewed  Continue medications and treatments as listed in EMR    Scribe Attestation  Sosa CHATTERJEE Scribe   attest that this documentation  has been prepared under the direction and in the presence of Martín Potts MD.     Provider Attestation - Scribe documentation  All medical record entries made by the Scribe were at my direction and personally dictated by me. I have reviewed the chart and agree that the record accurately reflects my personal performance of the history, physical exam, discussion and plan.   Martín Potts MD

## 2024-07-18 ENCOUNTER — NURSING HOME VISIT (OUTPATIENT)
Dept: POST ACUTE CARE | Facility: EXTERNAL LOCATION | Age: 84
End: 2024-07-18
Payer: COMMERCIAL

## 2024-07-18 DIAGNOSIS — I10 ESSENTIAL HYPERTENSION: ICD-10-CM

## 2024-07-18 DIAGNOSIS — R53.1 WEAKNESS: ICD-10-CM

## 2024-07-18 DIAGNOSIS — E11.9 TYPE 2 DIABETES MELLITUS WITHOUT COMPLICATION, WITH LONG-TERM CURRENT USE OF INSULIN (MULTI): ICD-10-CM

## 2024-07-18 DIAGNOSIS — Z79.4 TYPE 2 DIABETES MELLITUS WITHOUT COMPLICATION, WITH LONG-TERM CURRENT USE OF INSULIN (MULTI): ICD-10-CM

## 2024-07-18 DIAGNOSIS — F03.90 DEMENTIA, UNSPECIFIED DEMENTIA SEVERITY, UNSPECIFIED DEMENTIA TYPE, UNSPECIFIED WHETHER BEHAVIORAL, PSYCHOTIC, OR MOOD DISTURBANCE OR ANXIETY (MULTI): Primary | ICD-10-CM

## 2024-07-18 PROCEDURE — 99308 SBSQ NF CARE LOW MDM 20: CPT | Performed by: INTERNAL MEDICINE

## 2024-07-18 NOTE — ASSESSMENT & PLAN NOTE
Assist with ADLs and care.  Monitor for safety  Aripirazole  
Continue to work in therapy, improving  
Glucoscan  Metformin  Insulin  
Monitor blood pressure  BP at goal  Amlodipine  Lisinopril  
DISPLAY PLAN FREE TEXT

## 2024-07-18 NOTE — PROGRESS NOTES
PROGRESS NOTE    Subjective   Chief complaint: Homero Stone is a 83 y.o. male who is an acute skilled patient being seen and evaluated for weakness    HPI:  HPI  An interactive audio and/or video telecommunication system which permits real time communications between the patient (at the originating site) and provider (at a distant site) was utilized to provide this telehealth service after obtaining verbal consent.  Patient is currently working in therapy, progressing towards goals.  Patient is working on gait training, is able to ambulate ad aj. with no assistive device.  Therapy is also working with patient on safe transfers from various surfaces, patient is completing with moderate independence.  Patient is also working on ADLs, requiring mod to max assist to complete upper and lower body tasks.  Patient appears to be in no acute distress.    Objective   Vital signs: 112/60, 97.8, 72, 18, 97%, blood sugar 180    Physical Exam  Constitutional:       General: He is not in acute distress.  Eyes:      Extraocular Movements: Extraocular movements intact.   Pulmonary:      Effort: Pulmonary effort is normal.   Musculoskeletal:      Cervical back: Neck supple.   Neurological:      Mental Status: He is alert.   Psychiatric:         Mood and Affect: Mood normal.         Behavior: Behavior is cooperative.         Assessment/Plan   Problem List Items Addressed This Visit       Type 2 diabetes mellitus without complication, with long-term current use of insulin (Multi)     Glucoscan  Metformin  Insulin         Essential hypertension     Monitor blood pressure  BP at goal  Amlodipine  Lisinopril         Weakness     Continue to work in therapy, improving         Dementia (Multi) - Primary     Assist with ADLs and care.  Monitor for safety  Aripirazole          Medications, treatments, and labs reviewed  Continue medications and treatments as listed in EMR      Lylaibgilda Attestation  SHENA, Rashad Lloydest  that this documentation has been prepared under the direction and in the presence of Marítn Potts MD    Provider Attestation - Scribe documentation  All medical record entries made by the Scribe were at my direction and personally dictated by me. I have reviewed the chart and agree that the record accurately reflects my personal performance of the history, physical exam, discussion and plan.   Martín Potts MD

## 2024-07-18 NOTE — LETTER
Patient: Homero Stone  : 1940    Encounter Date: 2024    PROGRESS NOTE    Subjective  Chief complaint: Homero Stone is a 83 y.o. male who is an acute skilled patient being seen and evaluated for weakness    HPI:  HPI  An interactive audio and/or video telecommunication system which permits real time communications between the patient (at the originating site) and provider (at a distant site) was utilized to provide this telehealth service after obtaining verbal consent.  Patient is currently working in therapy, progressing towards goals.  Patient is working on gait training, is able to ambulate ad aj. with no assistive device.  Therapy is also working with patient on safe transfers from various surfaces, patient is completing with moderate independence.  Patient is also working on ADLs, requiring mod to max assist to complete upper and lower body tasks.  Patient appears to be in no acute distress.    Objective  Vital signs: 112/60, 97.8, 72, 18, 97%, blood sugar 180    Physical Exam  Constitutional:       General: He is not in acute distress.  Eyes:      Extraocular Movements: Extraocular movements intact.   Pulmonary:      Effort: Pulmonary effort is normal.   Musculoskeletal:      Cervical back: Neck supple.   Neurological:      Mental Status: He is alert.   Psychiatric:         Mood and Affect: Mood normal.         Behavior: Behavior is cooperative.         Assessment/Plan  Problem List Items Addressed This Visit       Type 2 diabetes mellitus without complication, with long-term current use of insulin (Multi)     Glucoscan  Metformin  Insulin         Essential hypertension     Monitor blood pressure  BP at goal  Amlodipine  Lisinopril         Weakness     Continue to work in therapy, improving         Dementia (Multi) - Primary     Assist with ADLs and care.  Monitor for safety  Aripirazole          Medications, treatments, and labs reviewed  Continue medications and treatments as listed  in EMR      Scribe Attestation  I, Rashad Lloyd   attest that this documentation has been prepared under the direction and in the presence of Martín Potts MD    Provider Attestation - Scribe documentation  All medical record entries made by the Scribe were at my direction and personally dictated by me. I have reviewed the chart and agree that the record accurately reflects my personal performance of the history, physical exam, discussion and plan.   Martín Potts MD        Electronically Signed By: Martín Potts MD   7/18/24 12:38 PM

## 2024-07-22 ENCOUNTER — NURSING HOME VISIT (OUTPATIENT)
Dept: POST ACUTE CARE | Facility: EXTERNAL LOCATION | Age: 84
End: 2024-07-22
Payer: COMMERCIAL

## 2024-07-22 DIAGNOSIS — N40.1 BENIGN LOCALIZED HYPERPLASIA OF PROSTATE WITH URINARY OBSTRUCTION AND LOWER URINARY TRACT SYMPTOMS: ICD-10-CM

## 2024-07-22 DIAGNOSIS — R53.1 WEAKNESS: ICD-10-CM

## 2024-07-22 DIAGNOSIS — E11.9 TYPE 2 DIABETES MELLITUS WITHOUT COMPLICATION, WITH LONG-TERM CURRENT USE OF INSULIN (MULTI): ICD-10-CM

## 2024-07-22 DIAGNOSIS — N13.9 BENIGN LOCALIZED HYPERPLASIA OF PROSTATE WITH URINARY OBSTRUCTION AND LOWER URINARY TRACT SYMPTOMS: ICD-10-CM

## 2024-07-22 DIAGNOSIS — Z79.4 TYPE 2 DIABETES MELLITUS WITHOUT COMPLICATION, WITH LONG-TERM CURRENT USE OF INSULIN (MULTI): ICD-10-CM

## 2024-07-22 DIAGNOSIS — F03.90 DEMENTIA, UNSPECIFIED DEMENTIA SEVERITY, UNSPECIFIED DEMENTIA TYPE, UNSPECIFIED WHETHER BEHAVIORAL, PSYCHOTIC, OR MOOD DISTURBANCE OR ANXIETY (MULTI): Primary | ICD-10-CM

## 2024-07-22 DIAGNOSIS — I10 ESSENTIAL HYPERTENSION: ICD-10-CM

## 2024-07-22 PROCEDURE — 99308 SBSQ NF CARE LOW MDM 20: CPT | Performed by: INTERNAL MEDICINE

## 2024-07-22 NOTE — PROGRESS NOTES
PROGRESS NOTE    Subjective   Chief complaint: Homero Stone is a 83 y.o. male who is an acute skilled patient being seen and evaluated for weakness    HPI:  Patient has been working in therapy to improve strength, endurance, and ADLs.  Patient continues to work toward goals. Ambulation is unlimited with mod\independent. Transfers with mod assist. ST addressing cognition. Planning to discharge home soon.  No new concerns today.  Denies n/v/f/c pain.        Objective   Vital signs: 101/69,97,97%, BS 97    Physical Exam  Constitutional:       General: He is not in acute distress.  Eyes:      Extraocular Movements: Extraocular movements intact.   Cardiovascular:      Rate and Rhythm: Normal rate and regular rhythm.   Pulmonary:      Effort: Pulmonary effort is normal.      Breath sounds: Normal breath sounds.      Comments: Lungs clear   Abdominal:      General: Bowel sounds are normal.      Palpations: Abdomen is soft.   Musculoskeletal:      Cervical back: Neck supple.      Right lower leg: No edema.      Left lower leg: No edema.   Neurological:      Mental Status: He is alert.   Psychiatric:         Mood and Affect: Mood normal.         Behavior: Behavior is cooperative.         Cognition and Memory: Cognition is impaired. Memory is impaired.         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  Tamsulosin         Type 2 diabetes mellitus without complication, with long-term current use of insulin (Multi)     Glucoscan  Metformin  Insulin         Essential hypertension     Monitor blood pressure  BP at goal  Amlodipine  Lisinopril         Weakness     Continue to work in therapy, improving         Dementia (Multi) - Primary     Assist with ADLs and care.  Monitor for safety  Aripirazole          Medications, treatments, and labs reviewed  Continue medications and  treatments as listed in EMR    Scribe Attestation  ISosa Scribe   attest that this documentation has been prepared under the direction and in the presence of Martín Potts MD.     Provider Attestation - Scribe documentation  All medical record entries made by the Scribe were at my direction and personally dictated by me. I have reviewed the chart and agree that the record accurately reflects my personal performance of the history, physical exam, discussion and plan.   Martín Potts MD

## 2024-07-22 NOTE — LETTER
Patient: Homero Stone  : 1940    Encounter Date: 2024    PROGRESS NOTE    Subjective  Chief complaint: Homero Stone is a 83 y.o. male who is an acute skilled patient being seen and evaluated for weakness    HPI:  Patient has been working in therapy to improve strength, endurance, and ADLs.  Patient continues to work toward goals. Ambulation is unlimited with mod\independent. Transfers with mod assist. ST addressing cognition. Planning to discharge home soon.  No new concerns today.  Denies n/v/f/c pain.        Objective  Vital signs: 101/69,97,97%, BS 97    Physical Exam  Constitutional:       General: He is not in acute distress.  Eyes:      Extraocular Movements: Extraocular movements intact.   Cardiovascular:      Rate and Rhythm: Normal rate and regular rhythm.   Pulmonary:      Effort: Pulmonary effort is normal.      Breath sounds: Normal breath sounds.      Comments: Lungs clear   Abdominal:      General: Bowel sounds are normal.      Palpations: Abdomen is soft.   Musculoskeletal:      Cervical back: Neck supple.      Right lower leg: No edema.      Left lower leg: No edema.   Neurological:      Mental Status: He is alert.   Psychiatric:         Mood and Affect: Mood normal.         Behavior: Behavior is cooperative.         Cognition and Memory: Cognition is impaired. Memory is impaired.         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  Tamsulosin         Type 2 diabetes mellitus without complication, with long-term current use of insulin (Multi)     Glucoscan  Metformin  Insulin         Essential hypertension     Monitor blood pressure  BP at goal  Amlodipine  Lisinopril         Weakness     Continue to work in therapy, improving         Dementia (Multi) - Primary     Assist with ADLs and care.  Monitor for safety  Aripirazole           Medications, treatments, and labs reviewed  Continue medications and treatments as listed in EMR    Scribe Attestation  I, Rashad Parker   attest that this documentation has been prepared under the direction and in the presence of Martín Potts MD.     Provider Attestation - Scribe documentation  All medical record entries made by the Scribe were at my direction and personally dictated by me. I have reviewed the chart and agree that the record accurately reflects my personal performance of the history, physical exam, discussion and plan.   Martín Potts MD      Electronically Signed By: Martín Potts MD   7/22/24 12:48 PM

## 2024-07-23 ENCOUNTER — NURSING HOME VISIT (OUTPATIENT)
Dept: POST ACUTE CARE | Facility: EXTERNAL LOCATION | Age: 84
End: 2024-07-23
Payer: COMMERCIAL

## 2024-07-23 DIAGNOSIS — F03.90 DEMENTIA, UNSPECIFIED DEMENTIA SEVERITY, UNSPECIFIED DEMENTIA TYPE, UNSPECIFIED WHETHER BEHAVIORAL, PSYCHOTIC, OR MOOD DISTURBANCE OR ANXIETY (MULTI): Primary | ICD-10-CM

## 2024-07-23 DIAGNOSIS — Z79.4 TYPE 2 DIABETES MELLITUS WITHOUT COMPLICATION, WITH LONG-TERM CURRENT USE OF INSULIN (MULTI): ICD-10-CM

## 2024-07-23 DIAGNOSIS — N40.1 BENIGN LOCALIZED HYPERPLASIA OF PROSTATE WITH URINARY OBSTRUCTION AND LOWER URINARY TRACT SYMPTOMS: ICD-10-CM

## 2024-07-23 DIAGNOSIS — N13.9 BENIGN LOCALIZED HYPERPLASIA OF PROSTATE WITH URINARY OBSTRUCTION AND LOWER URINARY TRACT SYMPTOMS: ICD-10-CM

## 2024-07-23 DIAGNOSIS — R53.1 WEAKNESS: ICD-10-CM

## 2024-07-23 DIAGNOSIS — I10 ESSENTIAL HYPERTENSION: ICD-10-CM

## 2024-07-23 DIAGNOSIS — E11.9 TYPE 2 DIABETES MELLITUS WITHOUT COMPLICATION, WITH LONG-TERM CURRENT USE OF INSULIN (MULTI): ICD-10-CM

## 2024-07-23 PROCEDURE — 99308 SBSQ NF CARE LOW MDM 20: CPT | Performed by: INTERNAL MEDICINE

## 2024-07-23 NOTE — PROGRESS NOTES
PROGRESS NOTE    Subjective   Chief complaint: Homero Stone is a 83 y.o. male who is an acute skilled patient being seen and evaluated for weakness    HPI:  Patient presents for f/u therapy and general medical care.  Patient seen and examined at beside.  Therapy has been working with the patient to improve strength, endurance, ADLs, and transfers d/t generalized weakness. He is ambulating an unlimited distance. She transfers with mod assist. Completes ADLs with mod to independent level. ST addressing cognition.  Patient has no acute concerns today.      Objective   Vital signs: 101/69,97,97%,     Physical Exam  Constitutional:       General: He is not in acute distress.  Eyes:      Extraocular Movements: Extraocular movements intact.   Cardiovascular:      Rate and Rhythm: Normal rate and regular rhythm.   Pulmonary:      Effort: Pulmonary effort is normal.      Breath sounds: Normal breath sounds.      Comments: Lungs clear   Abdominal:      General: Bowel sounds are normal.      Palpations: Abdomen is soft.   Musculoskeletal:      Cervical back: Neck supple.      Right lower leg: No edema.      Left lower leg: No edema.   Neurological:      Mental Status: He is alert.   Psychiatric:         Mood and Affect: Mood normal.         Behavior: Behavior is cooperative.         Cognition and Memory: Cognition is impaired. Memory is impaired.         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  Tamsulosin         Type 2 diabetes mellitus without complication, with long-term current use of insulin (Multi)     Glucoscan  Metformin  Insulin         Essential hypertension     Monitor blood pressure  BP at goal  Amlodipine  Lisinopril         Weakness     Continue to work in therapy, improving         Dementia (Multi) - Primary     Assist with ADLs and care.  Monitor for  safety  Aripirazole          Medications, treatments, and labs reviewed  Continue medications and treatments as listed in EMR    Scribe Attestation  I, Rashad Parker   attest that this documentation has been prepared under the direction and in the presence of Martín Potts MD.     Provider Attestation - Scribe documentation  All medical record entries made by the Scribe were at my direction and personally dictated by me. I have reviewed the chart and agree that the record accurately reflects my personal performance of the history, physical exam, discussion and plan.   Martín Potts MD

## 2024-07-23 NOTE — ASSESSMENT & PLAN NOTE
Monitor  symptoms.  Tamsulosin   Vascular Surgery follow up    Right upper ext dressing changed   wound remains clean, superficializing  new dressings placed    -- may shower with the dressing off  - no need to continue wound vac at this time  - RN to continue daily and PRN dressing changes

## 2024-07-23 NOTE — LETTER
Patient: Homero Stone  : 1940    Encounter Date: 2024    PROGRESS NOTE    Subjective  Chief complaint: Homero Stone is a 83 y.o. male who is an acute skilled patient being seen and evaluated for weakness    HPI:  Patient presents for f/u therapy and general medical care.  Patient seen and examined at beside.  Therapy has been working with the patient to improve strength, endurance, ADLs, and transfers d/t generalized weakness. He is ambulating an unlimited distance. She transfers with mod assist. Completes ADLs with mod to independent level. ST addressing cognition.  Patient has no acute concerns today.      Objective  Vital signs: 101/69,97,97%,     Physical Exam  Constitutional:       General: He is not in acute distress.  Eyes:      Extraocular Movements: Extraocular movements intact.   Cardiovascular:      Rate and Rhythm: Normal rate and regular rhythm.   Pulmonary:      Effort: Pulmonary effort is normal.      Breath sounds: Normal breath sounds.      Comments: Lungs clear   Abdominal:      General: Bowel sounds are normal.      Palpations: Abdomen is soft.   Musculoskeletal:      Cervical back: Neck supple.      Right lower leg: No edema.      Left lower leg: No edema.   Neurological:      Mental Status: He is alert.   Psychiatric:         Mood and Affect: Mood normal.         Behavior: Behavior is cooperative.         Cognition and Memory: Cognition is impaired. Memory is impaired.         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  Tamsulosin         Type 2 diabetes mellitus without complication, with long-term current use of insulin (Multi)     Glucoscan  Metformin  Insulin         Essential hypertension     Monitor blood pressure  BP at goal  Amlodipine  Lisinopril         Weakness     Continue to work in therapy, improving          Dementia (Multi) - Primary     Assist with ADLs and care.  Monitor for safety  Aripirazole          Medications, treatments, and labs reviewed  Continue medications and treatments as listed in EMR    Scribe Attestation  Sosa CHATTERJEE Scribe   attest that this documentation has been prepared under the direction and in the presence of Martín Potts MD.     Provider Attestation - Scribe documentation  All medical record entries made by the Scribe were at my direction and personally dictated by me. I have reviewed the chart and agree that the record accurately reflects my personal performance of the history, physical exam, discussion and plan.   Martní Potts MD      Electronically Signed By: Martín Potts MD   7/23/24  1:32 PM

## 2024-07-24 ENCOUNTER — NURSING HOME VISIT (OUTPATIENT)
Dept: POST ACUTE CARE | Facility: EXTERNAL LOCATION | Age: 84
End: 2024-07-24
Payer: COMMERCIAL

## 2024-07-24 DIAGNOSIS — F03.90 DEMENTIA, UNSPECIFIED DEMENTIA SEVERITY, UNSPECIFIED DEMENTIA TYPE, UNSPECIFIED WHETHER BEHAVIORAL, PSYCHOTIC, OR MOOD DISTURBANCE OR ANXIETY (MULTI): Primary | ICD-10-CM

## 2024-07-24 DIAGNOSIS — E11.9 TYPE 2 DIABETES MELLITUS WITHOUT COMPLICATION, WITH LONG-TERM CURRENT USE OF INSULIN (MULTI): ICD-10-CM

## 2024-07-24 DIAGNOSIS — I10 ESSENTIAL HYPERTENSION: ICD-10-CM

## 2024-07-24 DIAGNOSIS — N40.1 BENIGN LOCALIZED HYPERPLASIA OF PROSTATE WITH URINARY OBSTRUCTION AND LOWER URINARY TRACT SYMPTOMS: ICD-10-CM

## 2024-07-24 DIAGNOSIS — Z79.4 TYPE 2 DIABETES MELLITUS WITHOUT COMPLICATION, WITH LONG-TERM CURRENT USE OF INSULIN (MULTI): ICD-10-CM

## 2024-07-24 DIAGNOSIS — N13.9 BENIGN LOCALIZED HYPERPLASIA OF PROSTATE WITH URINARY OBSTRUCTION AND LOWER URINARY TRACT SYMPTOMS: ICD-10-CM

## 2024-07-24 PROCEDURE — 99309 SBSQ NF CARE MODERATE MDM 30: CPT | Performed by: NURSE PRACTITIONER

## 2024-07-24 NOTE — LETTER
Patient: Homero Stone  : 1940    Encounter Date: 2024    PROGRESS NOTE    Subjective  Chief complaint: Homero Stone is a 83 y.o. male who is a long term care patient being seen and evaluated for blood sugar regulation     HPI: reviewed blood sugar levels with nursing. Reviewed current labs. Has had no episodes of hypoglycemia. Is in bed watching sports channel.  Reports a good appetite.   Actively responding. Reports that he is comfortable. Denies any chest  pain or tightness.   HPI      Objective  Vital signs: 127/64-78-19-98.3     Physical Exam  Vitals and nursing note reviewed.   Constitutional:       General: He is not in acute distress.  Eyes:      Extraocular Movements: Extraocular movements intact.   Cardiovascular:      Rate and Rhythm: Normal rate and regular rhythm.   Pulmonary:      Effort: Pulmonary effort is normal.      Breath sounds: Normal breath sounds.      Comments: Lungs clear   Abdominal:      General: Bowel sounds are normal.      Palpations: Abdomen is soft.   Musculoskeletal:      Cervical back: Neck supple.      Right lower leg: No edema.      Left lower leg: No edema.   Neurological:      Mental Status: He is alert.   Psychiatric:         Mood and Affect: Mood normal.         Behavior: Behavior is cooperative.         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  Tamsulosin         Type 2 diabetes mellitus without complication, with long-term current use of insulin (Multi)     Glucoscan  Metformin  Insulin  No evidence of hypoglycemia   Appetite good          Essential hypertension     Monitor blood pressure  BP at goal  Amlodipine  Lisinopril         Dementia (Multi) - Primary     Assist with ADLs and care.  Monitor for safety  Aripirazole  Encourage all abilities           Medications, treatments, and labs reviewed  Continue  medications and treatments as listed in EMR    COREY Roche      Electronically Signed By: COREY Roche   7/26/24  8:02 PM

## 2024-07-25 ENCOUNTER — NURSING HOME VISIT (OUTPATIENT)
Dept: POST ACUTE CARE | Facility: EXTERNAL LOCATION | Age: 84
End: 2024-07-25
Payer: COMMERCIAL

## 2024-07-25 DIAGNOSIS — Z79.4 TYPE 2 DIABETES MELLITUS WITHOUT COMPLICATION, WITH LONG-TERM CURRENT USE OF INSULIN (MULTI): ICD-10-CM

## 2024-07-25 DIAGNOSIS — F03.90 DEMENTIA, UNSPECIFIED DEMENTIA SEVERITY, UNSPECIFIED DEMENTIA TYPE, UNSPECIFIED WHETHER BEHAVIORAL, PSYCHOTIC, OR MOOD DISTURBANCE OR ANXIETY (MULTI): ICD-10-CM

## 2024-07-25 DIAGNOSIS — I10 ESSENTIAL HYPERTENSION: ICD-10-CM

## 2024-07-25 DIAGNOSIS — E11.9 TYPE 2 DIABETES MELLITUS WITHOUT COMPLICATION, WITH LONG-TERM CURRENT USE OF INSULIN (MULTI): ICD-10-CM

## 2024-07-25 DIAGNOSIS — R53.1 WEAKNESS: ICD-10-CM

## 2024-07-25 PROCEDURE — 99308 SBSQ NF CARE LOW MDM 20: CPT | Performed by: INTERNAL MEDICINE

## 2024-07-25 NOTE — PROGRESS NOTES
PROGRESS NOTE    Subjective   Chief complaint: Homero Stone is a 83 y.o. male who is an acute skilled patient being seen and evaluated for weakness    HPI:  HPI.  Patient   With dementia, mentation at baseline, continues to work in therapy.  He ambulates unlimited distances and is modified independent for transfers as well as ADLs.  Patient continues to work in speech therapy for cognition.  No new issues or concerns.  No acute distress.  Objective   Vital signs:   100/60, 97.8, 76, 18, 97%, blood sugar 185    Physical Exam  Constitutional:       General: He is not in acute distress.  Eyes:      Extraocular Movements: Extraocular movements intact.   Cardiovascular:      Rate and Rhythm: Normal rate and regular rhythm.   Pulmonary:      Effort: Pulmonary effort is normal.      Breath sounds: Normal breath sounds.   Abdominal:      General: Bowel sounds are normal.      Palpations: Abdomen is soft.   Musculoskeletal:      Cervical back: Neck supple.      Right lower leg: No edema.      Left lower leg: No edema.   Neurological:      Mental Status: He is alert.   Psychiatric:         Mood and Affect: Mood normal.         Behavior: Behavior is cooperative.         Assessment/Plan   Problem List Items Addressed This Visit       Type 2 diabetes mellitus without complication, with long-term current use of insulin (Multi)     Glucoscan  Metformin  Insulin         Essential hypertension     Monitor blood pressure  BP at goal  Amlodipine  Lisinopril         Weakness     Continue to work in therapy, improving         Dementia (Multi)     Assist with ADLs and care.  Monitor for safety  Aripirazole          Medications, treatments, and labs reviewed  Continue medications and treatments as listed in [facility]    Martín Potts MD    1. Type 2 diabetes mellitus without complication, with long-term current use of insulin (Multi)        2. Dementia, unspecified dementia severity, unspecified dementia type, unspecified whether  behavioral, psychotic, or mood disturbance or anxiety (Multi)        3. Essential hypertension        4. Weakness             Scribe Attestation  By signing my name below, I, Rashad Hall   attest that this documentation has been prepared under the direction and in the presence of Martín Potts MD.    Provider Attestation - Scribe documentation  All medical record entries made by the Scribe were at my direction and personally dictated by me. I have reviewed the chart and agree that the record accurately reflects my personal performance of the history, physical exam, discussion and plan.

## 2024-07-25 NOTE — LETTER
Patient: Homero Stone  : 1940    Encounter Date: 2024    PROGRESS NOTE    Subjective  Chief complaint: Homero Stone is a 83 y.o. male who is an acute skilled patient being seen and evaluated for weakness    HPI:  HPI.  Patient   With dementia, mentation at baseline, continues to work in therapy.  He ambulates unlimited distances and is modified independent for transfers as well as ADLs.  Patient continues to work in speech therapy for cognition.  No new issues or concerns.  No acute distress.  Objective  Vital signs:   100/60, 97.8, 76, 18, 97%, blood sugar 185    Physical Exam  Constitutional:       General: He is not in acute distress.  Eyes:      Extraocular Movements: Extraocular movements intact.   Cardiovascular:      Rate and Rhythm: Normal rate and regular rhythm.   Pulmonary:      Effort: Pulmonary effort is normal.      Breath sounds: Normal breath sounds.   Abdominal:      General: Bowel sounds are normal.      Palpations: Abdomen is soft.   Musculoskeletal:      Cervical back: Neck supple.      Right lower leg: No edema.      Left lower leg: No edema.   Neurological:      Mental Status: He is alert.   Psychiatric:         Mood and Affect: Mood normal.         Behavior: Behavior is cooperative.         Assessment/Plan  Problem List Items Addressed This Visit       Type 2 diabetes mellitus without complication, with long-term current use of insulin (Multi)     Glucoscan  Metformin  Insulin         Essential hypertension     Monitor blood pressure  BP at goal  Amlodipine  Lisinopril         Weakness     Continue to work in therapy, improving         Dementia (Multi)     Assist with ADLs and care.  Monitor for safety  Aripirazole          Medications, treatments, and labs reviewed  Continue medications and treatments as listed in P[facility]    Martín Potts MD    1. Type 2 diabetes mellitus without complication, with long-term current use of insulin (Multi)        2. Dementia, unspecified  dementia severity, unspecified dementia type, unspecified whether behavioral, psychotic, or mood disturbance or anxiety (Multi)        3. Essential hypertension        4. Weakness             Scribe Attestation  By signing my name below, I, Rashad Hall   attest that this documentation has been prepared under the direction and in the presence of Martín Potts MD.    Provider Attestation - Scribe documentation  All medical record entries made by the Scribe were at my direction and personally dictated by me. I have reviewed the chart and agree that the record accurately reflects my personal performance of the history, physical exam, discussion and plan.      Electronically Signed By: Martín Potts MD   7/25/24  8:12 PM

## 2024-07-26 NOTE — PROGRESS NOTES
PROGRESS NOTE    Subjective   Chief complaint: Homero Stone is a 83 y.o. male who is a long term care patient being seen and evaluated for blood sugar regulation     HPI: reviewed blood sugar levels with nursing. Reviewed current labs. Has had no episodes of hypoglycemia. Is in bed watching sports channel.  Reports a good appetite.   Actively responding. Reports that he is comfortable. Denies any chest  pain or tightness.   HPI      Objective   Vital signs: 127/64-78-19-98.3     Physical Exam  Vitals and nursing note reviewed.   Constitutional:       General: He is not in acute distress.  Eyes:      Extraocular Movements: Extraocular movements intact.   Cardiovascular:      Rate and Rhythm: Normal rate and regular rhythm.   Pulmonary:      Effort: Pulmonary effort is normal.      Breath sounds: Normal breath sounds.      Comments: Lungs clear   Abdominal:      General: Bowel sounds are normal.      Palpations: Abdomen is soft.   Musculoskeletal:      Cervical back: Neck supple.      Right lower leg: No edema.      Left lower leg: No edema.   Neurological:      Mental Status: He is alert.   Psychiatric:         Mood and Affect: Mood normal.         Behavior: Behavior is cooperative.         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  Tamsulosin         Type 2 diabetes mellitus without complication, with long-term current use of insulin (Multi)     Glucoscan  Metformin  Insulin  No evidence of hypoglycemia   Appetite good          Essential hypertension     Monitor blood pressure  BP at goal  Amlodipine  Lisinopril         Dementia (Multi) - Primary     Assist with ADLs and care.  Monitor for safety  Aripirazole  Encourage all abilities           Medications, treatments, and labs reviewed  Continue medications and treatments as listed in EMR    Yocasta Diaz,  APRN-CNP

## 2024-07-29 ENCOUNTER — NURSING HOME VISIT (OUTPATIENT)
Dept: POST ACUTE CARE | Facility: EXTERNAL LOCATION | Age: 84
End: 2024-07-29
Payer: COMMERCIAL

## 2024-07-29 DIAGNOSIS — I10 ESSENTIAL HYPERTENSION: ICD-10-CM

## 2024-07-29 DIAGNOSIS — Z79.4 TYPE 2 DIABETES MELLITUS WITHOUT COMPLICATION, WITH LONG-TERM CURRENT USE OF INSULIN (MULTI): ICD-10-CM

## 2024-07-29 DIAGNOSIS — N40.1 BENIGN LOCALIZED HYPERPLASIA OF PROSTATE WITH URINARY OBSTRUCTION AND LOWER URINARY TRACT SYMPTOMS: ICD-10-CM

## 2024-07-29 DIAGNOSIS — R53.1 WEAKNESS: Primary | ICD-10-CM

## 2024-07-29 DIAGNOSIS — N13.9 BENIGN LOCALIZED HYPERPLASIA OF PROSTATE WITH URINARY OBSTRUCTION AND LOWER URINARY TRACT SYMPTOMS: ICD-10-CM

## 2024-07-29 DIAGNOSIS — F03.90 DEMENTIA, UNSPECIFIED DEMENTIA SEVERITY, UNSPECIFIED DEMENTIA TYPE, UNSPECIFIED WHETHER BEHAVIORAL, PSYCHOTIC, OR MOOD DISTURBANCE OR ANXIETY (MULTI): ICD-10-CM

## 2024-07-29 DIAGNOSIS — E11.9 TYPE 2 DIABETES MELLITUS WITHOUT COMPLICATION, WITH LONG-TERM CURRENT USE OF INSULIN (MULTI): ICD-10-CM

## 2024-07-29 PROCEDURE — 99308 SBSQ NF CARE LOW MDM 20: CPT | Performed by: INTERNAL MEDICINE

## 2024-07-29 NOTE — PROGRESS NOTES
PROGRESS NOTE    Subjective   Chief complaint: Homero Stone is a 83 y.o. male who is an acute skilled patient being seen and evaluated for weakness    HPI:  Patient presents for f/u therapy and general medical care.  Patient seen and examined at beside.  Therapy has been working with the patient to improve strength, endurance, ADLs, and transfers d/t generalized weakness. He is up ad aj in his room without an assistive device.  Patient has no acute concerns today.      Objective   Vital signs: 100/70,70,97%,     Physical Exam  Constitutional:       General: He is not in acute distress.  Eyes:      Extraocular Movements: Extraocular movements intact.   Cardiovascular:      Rate and Rhythm: Normal rate and regular rhythm.   Pulmonary:      Effort: Pulmonary effort is normal.      Breath sounds: Normal breath sounds.   Abdominal:      General: Bowel sounds are normal.      Palpations: Abdomen is soft.   Musculoskeletal:      Cervical back: Neck supple.      Right lower leg: No edema.      Left lower leg: No edema.   Neurological:      Mental Status: He is alert.   Psychiatric:         Mood and Affect: Mood normal.         Behavior: Behavior is cooperative.         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  Tamsulosin         Type 2 diabetes mellitus without complication, with long-term current use of insulin (Multi)     Glucoscan  Metformin  Insulin  No evidence of hypoglycemia           Essential hypertension     Monitor blood pressure  BP at goal  Amlodipine  Lisinopril         Weakness - Primary     Continue to work in therapy, improving         Dementia (Multi)     Assist with ADLs and care.  Monitor for safety  Aripirazole  Encourage all abilities           Medications, treatments, and labs reviewed  Continue medications and treatments as listed in  EMR    Scribe Attestation  I, Rashad Parker   attest that this documentation has been prepared under the direction and in the presence of Martín Potts MD.     Provider Attestation - Scribe documentation  All medical record entries made by the Scribe were at my direction and personally dictated by me. I have reviewed the chart and agree that the record accurately reflects my personal performance of the history, physical exam, discussion and plan.   Martín Potts MD

## 2024-07-29 NOTE — LETTER
Patient: Homero Stone  : 1940    Encounter Date: 2024    PROGRESS NOTE    Subjective  Chief complaint: Homero Stone is a 83 y.o. male who is an acute skilled patient being seen and evaluated for weakness    HPI:  Patient presents for f/u therapy and general medical care.  Patient seen and examined at beside.  Therapy has been working with the patient to improve strength, endurance, ADLs, and transfers d/t generalized weakness. He is up ad aj in his room without an assistive device.  Patient has no acute concerns today.      Objective  Vital signs: 100/70,70,97%,     Physical Exam  Constitutional:       General: He is not in acute distress.  Eyes:      Extraocular Movements: Extraocular movements intact.   Cardiovascular:      Rate and Rhythm: Normal rate and regular rhythm.   Pulmonary:      Effort: Pulmonary effort is normal.      Breath sounds: Normal breath sounds.   Abdominal:      General: Bowel sounds are normal.      Palpations: Abdomen is soft.   Musculoskeletal:      Cervical back: Neck supple.      Right lower leg: No edema.      Left lower leg: No edema.   Neurological:      Mental Status: He is alert.   Psychiatric:         Mood and Affect: Mood normal.         Behavior: Behavior is cooperative.         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  Tamsulosin         Type 2 diabetes mellitus without complication, with long-term current use of insulin (Multi)     Glucoscan  Metformin  Insulin  No evidence of hypoglycemia           Essential hypertension     Monitor blood pressure  BP at goal  Amlodipine  Lisinopril         Weakness - Primary     Continue to work in therapy, improving         Dementia (Multi)     Assist with ADLs and care.  Monitor for safety  Aripirazole  Encourage all abilities           Medications, treatments, and  labs reviewed  Continue medications and treatments as listed in EMR    Scribe Attestation  I, Rashad Parker   attest that this documentation has been prepared under the direction and in the presence of Martín Potts MD.     Provider Attestation - Scribe documentation  All medical record entries made by the Scribe were at my direction and personally dictated by me. I have reviewed the chart and agree that the record accurately reflects my personal performance of the history, physical exam, discussion and plan.   Martín Potts MD      Electronically Signed By: Martín Potts MD   7/29/24  6:51 PM

## 2024-07-30 ENCOUNTER — NURSING HOME VISIT (OUTPATIENT)
Dept: POST ACUTE CARE | Facility: EXTERNAL LOCATION | Age: 84
End: 2024-07-30
Payer: COMMERCIAL

## 2024-07-30 DIAGNOSIS — F03.90 DEMENTIA, UNSPECIFIED DEMENTIA SEVERITY, UNSPECIFIED DEMENTIA TYPE, UNSPECIFIED WHETHER BEHAVIORAL, PSYCHOTIC, OR MOOD DISTURBANCE OR ANXIETY (MULTI): ICD-10-CM

## 2024-07-30 DIAGNOSIS — R53.1 WEAKNESS: ICD-10-CM

## 2024-07-30 DIAGNOSIS — I10 ESSENTIAL HYPERTENSION: ICD-10-CM

## 2024-07-30 PROCEDURE — 99308 SBSQ NF CARE LOW MDM 20: CPT | Performed by: INTERNAL MEDICINE

## 2024-07-30 NOTE — PROGRESS NOTES
PROGRESS NOTE    Subjective   Chief complaint: Homero Stone is a 83 y.o. male who is an acute skilled patient being seen and evaluated for weakness    HPI:  HPI   Patient has no new complaints or concerns today.  Patient is working in therapy.  Denies n/v/f/c.   Patient  continues to work in speech therapy for cognition. No acute distress.   Objective   Vital signs: 124/72, 98%    Physical Exam  Constitutional:       General: He is not in acute distress.  Eyes:      Extraocular Movements: Extraocular movements intact.   Cardiovascular:      Rate and Rhythm: Normal rate and regular rhythm.   Pulmonary:      Effort: Pulmonary effort is normal.      Breath sounds: Normal breath sounds.   Abdominal:      General: Bowel sounds are normal.      Palpations: Abdomen is soft.   Musculoskeletal:      Cervical back: Neck supple.      Right lower leg: No edema.      Left lower leg: No edema.   Neurological:      Mental Status: He is alert.   Psychiatric:         Mood and Affect: Mood normal.         Behavior: Behavior is cooperative.         Assessment/Plan   Problem List Items Addressed This Visit       Essential hypertension     Monitor blood pressure  BP at goal  Amlodipine  Lisinopril         Weakness     Continue to work in therapy, improving         Dementia (Multi)     Assist with ADLs and care.  Monitor for safety  Aripirazole  Encourage all abilities   ST          Medications, treatments, and labs reviewed  Continue medications and treatments as listed in [facility]    Scribe Attestation  By signing my name below, I, Rasahd Hall   attest that this documentation has been prepared under the direction and in the presence of Martín Potts MD.    Provider Attestation - Scribe documentation  All medical record entries made by the Scribe were at my direction and personally dictated by me. I have reviewed the chart and agree that the record accurately reflects my personal performance of the history, physical exam,  discussion and plan.

## 2024-07-30 NOTE — LETTER
Patient: Homero Stone  : 1940    Encounter Date: 2024    PROGRESS NOTE    Subjective  Chief complaint: Homero Stone is a 83 y.o. male who is an acute skilled patient being seen and evaluated for weakness    HPI:  HPI   Patient has no new complaints or concerns today.  Patient is working in therapy.  Denies n/v/f/c.   Patient  continues to work in speech therapy for cognition. No acute distress.   Objective  Vital signs: 124/72, 98%    Physical Exam  Constitutional:       General: He is not in acute distress.  Eyes:      Extraocular Movements: Extraocular movements intact.   Cardiovascular:      Rate and Rhythm: Normal rate and regular rhythm.   Pulmonary:      Effort: Pulmonary effort is normal.      Breath sounds: Normal breath sounds.   Abdominal:      General: Bowel sounds are normal.      Palpations: Abdomen is soft.   Musculoskeletal:      Cervical back: Neck supple.      Right lower leg: No edema.      Left lower leg: No edema.   Neurological:      Mental Status: He is alert.   Psychiatric:         Mood and Affect: Mood normal.         Behavior: Behavior is cooperative.         Assessment/Plan  Problem List Items Addressed This Visit       Essential hypertension     Monitor blood pressure  BP at goal  Amlodipine  Lisinopril         Weakness     Continue to work in therapy, improving         Dementia (Multi)     Assist with ADLs and care.  Monitor for safety  Aripirazole  Encourage all abilities   ST          Medications, treatments, and labs reviewed  Continue medications and treatments as listed in [facility]    Scribe Attestation  By signing my name below, IAgatha Scribe   attest that this documentation has been prepared under the direction and in the presence of Martín Potts MD.    Provider Attestation - Scribe documentation  All medical record entries made by the Scribe were at my direction and personally dictated by me. I have reviewed the chart and agree that the record  accurately reflects my personal performance of the history, physical exam, discussion and plan.      Electronically Signed By: Martín Potts MD   7/30/24  2:10 PM

## 2024-08-01 ENCOUNTER — NURSING HOME VISIT (OUTPATIENT)
Dept: POST ACUTE CARE | Facility: EXTERNAL LOCATION | Age: 84
End: 2024-08-01
Payer: COMMERCIAL

## 2024-08-01 DIAGNOSIS — N40.1 BENIGN LOCALIZED HYPERPLASIA OF PROSTATE WITH URINARY OBSTRUCTION AND LOWER URINARY TRACT SYMPTOMS: ICD-10-CM

## 2024-08-01 DIAGNOSIS — N13.9 BENIGN LOCALIZED HYPERPLASIA OF PROSTATE WITH URINARY OBSTRUCTION AND LOWER URINARY TRACT SYMPTOMS: ICD-10-CM

## 2024-08-01 DIAGNOSIS — Z79.4 TYPE 2 DIABETES MELLITUS WITHOUT COMPLICATION, WITH LONG-TERM CURRENT USE OF INSULIN (MULTI): ICD-10-CM

## 2024-08-01 DIAGNOSIS — E11.9 TYPE 2 DIABETES MELLITUS WITHOUT COMPLICATION, WITH LONG-TERM CURRENT USE OF INSULIN (MULTI): ICD-10-CM

## 2024-08-01 DIAGNOSIS — I10 ESSENTIAL HYPERTENSION: ICD-10-CM

## 2024-08-01 DIAGNOSIS — R53.1 WEAKNESS: ICD-10-CM

## 2024-08-01 DIAGNOSIS — F03.90 DEMENTIA, UNSPECIFIED DEMENTIA SEVERITY, UNSPECIFIED DEMENTIA TYPE, UNSPECIFIED WHETHER BEHAVIORAL, PSYCHOTIC, OR MOOD DISTURBANCE OR ANXIETY (MULTI): Primary | ICD-10-CM

## 2024-08-01 PROCEDURE — 99308 SBSQ NF CARE LOW MDM 20: CPT | Performed by: INTERNAL MEDICINE

## 2024-08-01 NOTE — LETTER
Patient: Homero Stone  : 1940    Encounter Date: 2024    PROGRESS NOTE    Subjective  Chief complaint: Homero Stone is a 83 y.o. male who is an acute skilled patient being seen and evaluated for weakness    HPI:  HPI  Patient does continue to work in therapy.  Therapy is working patient on gait training, is able to ambulate unlimited distances with no assistive device.  Patient is also working on safe transfers, completing from various surfaces with moderate independence.  Patient is also working on ADL retraining.  Speech therapy working patient to address cognition.  Patient was seen and examined at the bedside, appears to be in no acute distress.  Patient denies chest pain, shortness of breath.  Mentation is at baseline.    Objective  Vital signs: 106/60, 97.8, 76, 18, 97%, blood sugar 129    Physical Exam  Constitutional:       General: He is not in acute distress.  Eyes:      Extraocular Movements: Extraocular movements intact.   Cardiovascular:      Rate and Rhythm: Normal rate and regular rhythm.   Pulmonary:      Effort: Pulmonary effort is normal.      Breath sounds: Normal breath sounds.   Abdominal:      General: Bowel sounds are normal.      Palpations: Abdomen is soft.   Musculoskeletal:      Cervical back: Neck supple.      Right lower leg: No edema.      Left lower leg: No edema.   Neurological:      Mental Status: He is alert.   Psychiatric:         Mood and Affect: Mood normal.         Behavior: Behavior is cooperative.         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  Tamsulosin         Type 2 diabetes mellitus without complication, with long-term current use of insulin (Multi)     Glucoscan  Metformin  Insulin  No evidence of hypoglycemia           Essential hypertension     Monitor blood pressure  BP at  goal  Amlodipine  Lisinopril         Weakness     Continue working towards goals         Dementia (Multi) - Primary     Assist with ADLs and care.  Monitor for safety  Aripirazole  Encourage all abilities   ST          Medications, treatments, and labs reviewed  Continue medications and treatments as listed in EMR      Scribe Attestation  Comfort CHATTERJEE Scribe   attest that this documentation has been prepared under the direction and in the presence of Martín Potts MD    Provider Attestation - Scribe documentation  All medical record entries made by the Scribe were at my direction and personally dictated by me. I have reviewed the chart and agree that the record accurately reflects my personal performance of the history, physical exam, discussion and plan.   Martín Potts MD        Electronically Signed By: Martín Potts MD   8/1/24  1:05 PM

## 2024-08-01 NOTE — PROGRESS NOTES
PROGRESS NOTE    Subjective   Chief complaint: Homero Stone is a 83 y.o. male who is an acute skilled patient being seen and evaluated for weakness    HPI:  HPI  Patient does continue to work in therapy.  Therapy is working patient on gait training, is able to ambulate unlimited distances with no assistive device.  Patient is also working on safe transfers, completing from various surfaces with moderate independence.  Patient is also working on ADL retraining.  Speech therapy working patient to address cognition.  Patient was seen and examined at the bedside, appears to be in no acute distress.  Patient denies chest pain, shortness of breath.  Mentation is at baseline.    Objective   Vital signs: 106/60, 97.8, 76, 18, 97%, blood sugar 129    Physical Exam  Constitutional:       General: He is not in acute distress.  Eyes:      Extraocular Movements: Extraocular movements intact.   Cardiovascular:      Rate and Rhythm: Normal rate and regular rhythm.   Pulmonary:      Effort: Pulmonary effort is normal.      Breath sounds: Normal breath sounds.   Abdominal:      General: Bowel sounds are normal.      Palpations: Abdomen is soft.   Musculoskeletal:      Cervical back: Neck supple.      Right lower leg: No edema.      Left lower leg: No edema.   Neurological:      Mental Status: He is alert.   Psychiatric:         Mood and Affect: Mood normal.         Behavior: Behavior is cooperative.         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  Tamsulosin         Type 2 diabetes mellitus without complication, with long-term current use of insulin (Multi)     Glucoscan  Metformin  Insulin  No evidence of hypoglycemia           Essential hypertension     Monitor blood pressure  BP at goal  Amlodipine  Lisinopril         Weakness     Continue working towards goals         Dementia  (Multi) - Primary     Assist with ADLs and care.  Monitor for safety  Aripirazole  Encourage all abilities   ST          Medications, treatments, and labs reviewed  Continue medications and treatments as listed in EMR      Scribe Attestation  I, Rashad Lloyd   attest that this documentation has been prepared under the direction and in the presence of Martín Potts MD    Provider Attestation - Scribe documentation  All medical record entries made by the Scribe were at my direction and personally dictated by me. I have reviewed the chart and agree that the record accurately reflects my personal performance of the history, physical exam, discussion and plan.   Martín Potts MD

## 2024-08-05 ENCOUNTER — NURSING HOME VISIT (OUTPATIENT)
Dept: POST ACUTE CARE | Facility: EXTERNAL LOCATION | Age: 84
End: 2024-08-05
Payer: COMMERCIAL

## 2024-08-05 DIAGNOSIS — F03.90 DEMENTIA, UNSPECIFIED DEMENTIA SEVERITY, UNSPECIFIED DEMENTIA TYPE, UNSPECIFIED WHETHER BEHAVIORAL, PSYCHOTIC, OR MOOD DISTURBANCE OR ANXIETY (MULTI): Primary | ICD-10-CM

## 2024-08-05 DIAGNOSIS — Z79.4 TYPE 2 DIABETES MELLITUS WITHOUT COMPLICATION, WITH LONG-TERM CURRENT USE OF INSULIN (MULTI): ICD-10-CM

## 2024-08-05 DIAGNOSIS — N40.1 BENIGN LOCALIZED HYPERPLASIA OF PROSTATE WITH URINARY OBSTRUCTION AND LOWER URINARY TRACT SYMPTOMS: ICD-10-CM

## 2024-08-05 DIAGNOSIS — R53.1 WEAKNESS: ICD-10-CM

## 2024-08-05 DIAGNOSIS — I10 ESSENTIAL HYPERTENSION: ICD-10-CM

## 2024-08-05 DIAGNOSIS — E11.9 TYPE 2 DIABETES MELLITUS WITHOUT COMPLICATION, WITH LONG-TERM CURRENT USE OF INSULIN (MULTI): ICD-10-CM

## 2024-08-05 DIAGNOSIS — N13.9 BENIGN LOCALIZED HYPERPLASIA OF PROSTATE WITH URINARY OBSTRUCTION AND LOWER URINARY TRACT SYMPTOMS: ICD-10-CM

## 2024-08-05 PROCEDURE — 99309 SBSQ NF CARE MODERATE MDM 30: CPT | Performed by: INTERNAL MEDICINE

## 2024-08-05 NOTE — LETTER
Patient: Homero Stone  : 1940    Encounter Date: 2024    PROGRESS NOTE    Subjective  Chief complaint: Homero Stone is a 83 y.o. male who is an acute skilled patient being seen and evaluated for weakness & monthly general medical care and follow-up    HPI:  Patient presents for general medical care and f/u.  Patient seen and examined at bedside. Patient has dementia and requires assistance with ADLs. T2DM, Patient denies vision changes, excessive thirst, sweating, urinary frequency. BPH, denies difficulty urinating, weak stream or frequency. HTN, denies fatigue, sob, and palpitations. Therapy has been working with the patient to improve strength and endurance with ADLs, transfers, and mobility.  Patient continues to work toward goals.   No issues per nursing.  Patient has no acute complaints.      Objective  Vital signs: 112/75,78,98%,     Physical Exam  Constitutional:       General: He is not in acute distress.  Eyes:      Extraocular Movements: Extraocular movements intact.   Cardiovascular:      Rate and Rhythm: Normal rate and regular rhythm.   Pulmonary:      Effort: Pulmonary effort is normal.      Breath sounds: Normal breath sounds.   Abdominal:      General: Bowel sounds are normal.      Palpations: Abdomen is soft.   Musculoskeletal:      Cervical back: Neck supple.      Right lower leg: No edema.      Left lower leg: No edema.   Neurological:      Mental Status: He is alert.   Psychiatric:         Mood and Affect: Mood normal.         Behavior: Behavior is cooperative.         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  Tamsulosin         Type 2 diabetes mellitus without complication, with long-term current use of insulin (Multi)     Glucoscan  Metformin  Insulin  No evidence of hypoglycemia           Essential hypertension      Monitor blood pressure  BP at goal  Amlodipine  Lisinopril         Weakness     Continue working towards goals         Dementia (Multi) - Primary     Assist with ADLs and care.  Monitor for safety  Aripirazole  Encourage all abilities   ST          Medications, treatments, and labs reviewed  Continue medications and treatments as listed in EMR    Scribe Attestation  Sosa CHATTERJEE Scribe   attest that this documentation has been prepared under the direction and in the presence of Martín Potts MD.     Provider Attestation - Scribe documentation  All medical record entries made by the Scribe were at my direction and personally dictated by me. I have reviewed the chart and agree that the record accurately reflects my personal performance of the history, physical exam, discussion and plan.   Martín Potts MD      Electronically Signed By: Martín Potts MD   8/5/24  2:01 PM

## 2024-08-05 NOTE — PROGRESS NOTES
PROGRESS NOTE    Subjective   Chief complaint: Homero Stone is a 83 y.o. male who is an acute skilled patient being seen and evaluated for weakness & monthly general medical care and follow-up    HPI:  Patient presents for general medical care and f/u.  Patient seen and examined at bedside. Patient has dementia and requires assistance with ADLs. T2DM, Patient denies vision changes, excessive thirst, sweating, urinary frequency. BPH, denies difficulty urinating, weak stream or frequency. HTN, denies fatigue, sob, and palpitations. Therapy has been working with the patient to improve strength and endurance with ADLs, transfers, and mobility.  Patient continues to work toward goals.   No issues per nursing.  Patient has no acute complaints.      Objective   Vital signs: 112/75,78,98%,     Physical Exam  Constitutional:       General: He is not in acute distress.  Eyes:      Extraocular Movements: Extraocular movements intact.   Cardiovascular:      Rate and Rhythm: Normal rate and regular rhythm.   Pulmonary:      Effort: Pulmonary effort is normal.      Breath sounds: Normal breath sounds.   Abdominal:      General: Bowel sounds are normal.      Palpations: Abdomen is soft.   Musculoskeletal:      Cervical back: Neck supple.      Right lower leg: No edema.      Left lower leg: No edema.   Neurological:      Mental Status: He is alert.   Psychiatric:         Mood and Affect: Mood normal.         Behavior: Behavior is cooperative.         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  Tamsulosin         Type 2 diabetes mellitus without complication, with long-term current use of insulin (Multi)     Glucoscan  Metformin  Insulin  No evidence of hypoglycemia           Essential hypertension     Monitor blood pressure  BP at goal  Amlodipine  Lisinopril         Weakness      Continue working towards goals         Dementia (Multi) - Primary     Assist with ADLs and care.  Monitor for safety  Aripirazole  Encourage all abilities   ST          Medications, treatments, and labs reviewed  Continue medications and treatments as listed in EMR    Scribe Attestation  Sosa CHATTERJEE Scribe   attest that this documentation has been prepared under the direction and in the presence of Martín Potts MD.     Provider Attestation - Scribe documentation  All medical record entries made by the Scribe were at my direction and personally dictated by me. I have reviewed the chart and agree that the record accurately reflects my personal performance of the history, physical exam, discussion and plan.   Martín Potts MD

## 2024-08-06 ENCOUNTER — NURSING HOME VISIT (OUTPATIENT)
Dept: POST ACUTE CARE | Facility: EXTERNAL LOCATION | Age: 84
End: 2024-08-06
Payer: COMMERCIAL

## 2024-08-06 DIAGNOSIS — Z79.4 TYPE 2 DIABETES MELLITUS WITHOUT COMPLICATION, WITH LONG-TERM CURRENT USE OF INSULIN (MULTI): ICD-10-CM

## 2024-08-06 DIAGNOSIS — N13.9 BENIGN LOCALIZED HYPERPLASIA OF PROSTATE WITH URINARY OBSTRUCTION AND LOWER URINARY TRACT SYMPTOMS: ICD-10-CM

## 2024-08-06 DIAGNOSIS — N40.1 BENIGN LOCALIZED HYPERPLASIA OF PROSTATE WITH URINARY OBSTRUCTION AND LOWER URINARY TRACT SYMPTOMS: ICD-10-CM

## 2024-08-06 DIAGNOSIS — R53.1 WEAKNESS: ICD-10-CM

## 2024-08-06 DIAGNOSIS — I10 ESSENTIAL HYPERTENSION: ICD-10-CM

## 2024-08-06 DIAGNOSIS — E11.9 TYPE 2 DIABETES MELLITUS WITHOUT COMPLICATION, WITH LONG-TERM CURRENT USE OF INSULIN (MULTI): ICD-10-CM

## 2024-08-06 DIAGNOSIS — F03.90 DEMENTIA, UNSPECIFIED DEMENTIA SEVERITY, UNSPECIFIED DEMENTIA TYPE, UNSPECIFIED WHETHER BEHAVIORAL, PSYCHOTIC, OR MOOD DISTURBANCE OR ANXIETY (MULTI): ICD-10-CM

## 2024-08-06 PROCEDURE — 99308 SBSQ NF CARE LOW MDM 20: CPT | Performed by: INTERNAL MEDICINE

## 2024-08-06 NOTE — PROGRESS NOTES
PROGRESS NOTE    Subjective   Chief complaint: Homero Stone is a 83 y.o. male who is an acute skilled patient being seen and evaluated for weakness    HPI:  Patient presents for f/u therapy and general medical care.  Patient seen and examined at beside.  Therapy has been working with the patient to improve strength, endurance, ADLs, and transfers d/t generalized weakness. ST addressing cognition. Patient has no acute concerns today.      Objective   Vital signs: 145/74,70,98%,     Physical Exam  Constitutional:       General: He is not in acute distress.  Eyes:      Extraocular Movements: Extraocular movements intact.   Cardiovascular:      Rate and Rhythm: Normal rate and regular rhythm.   Pulmonary:      Effort: Pulmonary effort is normal.      Breath sounds: Normal breath sounds.   Abdominal:      General: Bowel sounds are normal.      Palpations: Abdomen is soft.   Musculoskeletal:      Cervical back: Neck supple.      Right lower leg: No edema.      Left lower leg: No edema.   Neurological:      Mental Status: He is alert.   Psychiatric:         Mood and Affect: Mood normal.         Behavior: Behavior is cooperative.         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  Tamsulosin         Type 2 diabetes mellitus without complication, with long-term current use of insulin (Multi)     Glucoscan  Metformin  Insulin  No evidence of hypoglycemia           Essential hypertension     Monitor blood pressure  BP at goal  Amlodipine  Lisinopril         Weakness     Continue working towards goals         Dementia (Multi)     Assist with ADLs and care.  Monitor for safety  Aripirazole  Encourage all abilities   ST          Medications, treatments, and labs reviewed  Continue medications and treatments as listed in EMR    Rashad Solomonestation  I, Rashad Parker    attest that this documentation has been prepared under the direction and in the presence of Martín Potts MD.     Provider Attestation - Scribe documentation  All medical record entries made by the Scribe were at my direction and personally dictated by me. I have reviewed the chart and agree that the record accurately reflects my personal performance of the history, physical exam, discussion and plan.   Martín Potts MD

## 2024-08-06 NOTE — LETTER
Patient: Homero Stone  : 1940    Encounter Date: 2024    PROGRESS NOTE    Subjective  Chief complaint: Homero Stone is a 83 y.o. male who is an acute skilled patient being seen and evaluated for weakness    HPI:  Patient presents for f/u therapy and general medical care.  Patient seen and examined at beside.  Therapy has been working with the patient to improve strength, endurance, ADLs, and transfers d/t generalized weakness. ST addressing cognition. Patient has no acute concerns today.      Objective  Vital signs: 145/74,70,98%,     Physical Exam  Constitutional:       General: He is not in acute distress.  Eyes:      Extraocular Movements: Extraocular movements intact.   Cardiovascular:      Rate and Rhythm: Normal rate and regular rhythm.   Pulmonary:      Effort: Pulmonary effort is normal.      Breath sounds: Normal breath sounds.   Abdominal:      General: Bowel sounds are normal.      Palpations: Abdomen is soft.   Musculoskeletal:      Cervical back: Neck supple.      Right lower leg: No edema.      Left lower leg: No edema.   Neurological:      Mental Status: He is alert.   Psychiatric:         Mood and Affect: Mood normal.         Behavior: Behavior is cooperative.         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  Tamsulosin         Type 2 diabetes mellitus without complication, with long-term current use of insulin (Multi)     Glucoscan  Metformin  Insulin  No evidence of hypoglycemia           Essential hypertension     Monitor blood pressure  BP at goal  Amlodipine  Lisinopril         Weakness     Continue working towards goals         Dementia (Multi)     Assist with ADLs and care.  Monitor for safety  Aripirazole  Encourage all abilities   ST          Medications, treatments, and labs reviewed  Continue medications and  treatments as listed in EMR    Scribe Attestation  ISosa Scribe   attest that this documentation has been prepared under the direction and in the presence of Martín Potts MD.     Provider Attestation - Scribe documentation  All medical record entries made by the Scribe were at my direction and personally dictated by me. I have reviewed the chart and agree that the record accurately reflects my personal performance of the history, physical exam, discussion and plan.   Martín Potts MD      Electronically Signed By: Martín Potts MD   8/6/24  1:41 PM

## 2024-08-08 ENCOUNTER — NURSING HOME VISIT (OUTPATIENT)
Dept: POST ACUTE CARE | Facility: EXTERNAL LOCATION | Age: 84
End: 2024-08-08
Payer: COMMERCIAL

## 2024-08-08 DIAGNOSIS — Z79.4 TYPE 2 DIABETES MELLITUS WITHOUT COMPLICATION, WITH LONG-TERM CURRENT USE OF INSULIN (MULTI): ICD-10-CM

## 2024-08-08 DIAGNOSIS — R53.1 WEAKNESS: ICD-10-CM

## 2024-08-08 DIAGNOSIS — I10 ESSENTIAL HYPERTENSION: ICD-10-CM

## 2024-08-08 DIAGNOSIS — F03.90 DEMENTIA, UNSPECIFIED DEMENTIA SEVERITY, UNSPECIFIED DEMENTIA TYPE, UNSPECIFIED WHETHER BEHAVIORAL, PSYCHOTIC, OR MOOD DISTURBANCE OR ANXIETY (MULTI): Primary | ICD-10-CM

## 2024-08-08 DIAGNOSIS — E11.9 TYPE 2 DIABETES MELLITUS WITHOUT COMPLICATION, WITH LONG-TERM CURRENT USE OF INSULIN (MULTI): ICD-10-CM

## 2024-08-08 PROCEDURE — 99308 SBSQ NF CARE LOW MDM 20: CPT | Performed by: INTERNAL MEDICINE

## 2024-08-08 NOTE — LETTER
Patient: Homero Stone  : 1940    Encounter Date: 2024    PROGRESS NOTE    Subjective  Chief complaint: Homero Stone is a 83 y.o. male who is an acute skilled patient being seen and evaluated for weakness    HPI:  HPI  Patient is currently working in therapy due to generalized weakness.  Patient is ambulating increasing distances with no assistive device ad aj.  Patient is also working on safe transfers from various surfaces.  Patient is also completing ADL tasks with moderate independence for upper and lower body.  Patient was seen and examined at the bedside, appears to be in no acute distress.  Patient denies chest pain, shortness of breath, nausea or vomiting.  Mentation at baseline.    Objective  Vital signs: 100/60, 97.7, 78, 18, 98% blood sugar 113    Physical Exam  Constitutional:       General: He is not in acute distress.  Eyes:      Extraocular Movements: Extraocular movements intact.   Cardiovascular:      Rate and Rhythm: Normal rate and regular rhythm.   Pulmonary:      Effort: Pulmonary effort is normal.      Breath sounds: Normal breath sounds.   Abdominal:      General: Bowel sounds are normal.      Palpations: Abdomen is soft.   Musculoskeletal:      Cervical back: Neck supple.      Right lower leg: No edema.      Left lower leg: No edema.   Neurological:      Mental Status: He is alert.   Psychiatric:         Mood and Affect: Mood normal.         Behavior: Behavior is cooperative.         Assessment/Plan  Problem List Items Addressed This Visit       Type 2 diabetes mellitus without complication, with long-term current use of insulin (Multi)     Glucoscan  Metformin  Insulin  No evidence of hypoglycemia           Essential hypertension     Monitor blood pressure  BP at goal  Amlodipine  Lisinopril         Weakness     Continue to participate in therapy         Dementia (Multi) - Primary     Assist with ADLs and care.  Monitor for safety  Aripirazole  Encourage all abilities    ST          Medications, treatments, and labs reviewed  Continue medications and treatments as listed in EMR      Scribe Attestation  I, Rashad Lloyd   attest that this documentation has been prepared under the direction and in the presence of Martín Potts MD    Provider Attestation - Scribe documentation  All medical record entries made by the Scribe were at my direction and personally dictated by me. I have reviewed the chart and agree that the record accurately reflects my personal performance of the history, physical exam, discussion and plan.   Martín Potts MD        Electronically Signed By: Martín Potts MD   8/8/24  1:51 PM

## 2024-08-08 NOTE — PROGRESS NOTES
PROGRESS NOTE    Subjective   Chief complaint: Homero Stone is a 83 y.o. male who is an acute skilled patient being seen and evaluated for weakness    HPI:  HPI  Patient is currently working in therapy due to generalized weakness.  Patient is ambulating increasing distances with no assistive device ad aj.  Patient is also working on safe transfers from various surfaces.  Patient is also completing ADL tasks with moderate independence for upper and lower body.  Patient was seen and examined at the bedside, appears to be in no acute distress.  Patient denies chest pain, shortness of breath, nausea or vomiting.  Mentation at baseline.    Objective   Vital signs: 100/60, 97.7, 78, 18, 98% blood sugar 113    Physical Exam  Constitutional:       General: He is not in acute distress.  Eyes:      Extraocular Movements: Extraocular movements intact.   Cardiovascular:      Rate and Rhythm: Normal rate and regular rhythm.   Pulmonary:      Effort: Pulmonary effort is normal.      Breath sounds: Normal breath sounds.   Abdominal:      General: Bowel sounds are normal.      Palpations: Abdomen is soft.   Musculoskeletal:      Cervical back: Neck supple.      Right lower leg: No edema.      Left lower leg: No edema.   Neurological:      Mental Status: He is alert.   Psychiatric:         Mood and Affect: Mood normal.         Behavior: Behavior is cooperative.         Assessment/Plan   Problem List Items Addressed This Visit       Type 2 diabetes mellitus without complication, with long-term current use of insulin (Multi)     Glucoscan  Metformin  Insulin  No evidence of hypoglycemia           Essential hypertension     Monitor blood pressure  BP at goal  Amlodipine  Lisinopril         Weakness     Continue to participate in therapy         Dementia (Multi) - Primary     Assist with ADLs and care.  Monitor for safety  Aripirazole  Encourage all abilities   ST          Medications, treatments, and labs reviewed  Continue  medications and treatments as listed in EMR      Scribe Attestation  I, Rashad Lloyd   attest that this documentation has been prepared under the direction and in the presence of Martín Potts MD    Provider Attestation - Scribe documentation  All medical record entries made by the Scribe were at my direction and personally dictated by me. I have reviewed the chart and agree that the record accurately reflects my personal performance of the history, physical exam, discussion and plan.   Martín Potts MD

## 2024-08-10 ENCOUNTER — NURSING HOME VISIT (OUTPATIENT)
Dept: POST ACUTE CARE | Facility: EXTERNAL LOCATION | Age: 84
End: 2024-08-10
Payer: COMMERCIAL

## 2024-08-10 DIAGNOSIS — I10 ESSENTIAL HYPERTENSION: ICD-10-CM

## 2024-08-10 DIAGNOSIS — R53.1 WEAKNESS: ICD-10-CM

## 2024-08-10 DIAGNOSIS — Z79.4 TYPE 2 DIABETES MELLITUS WITHOUT COMPLICATION, WITH LONG-TERM CURRENT USE OF INSULIN (MULTI): Primary | ICD-10-CM

## 2024-08-10 DIAGNOSIS — E11.9 TYPE 2 DIABETES MELLITUS WITHOUT COMPLICATION, WITH LONG-TERM CURRENT USE OF INSULIN (MULTI): Primary | ICD-10-CM

## 2024-08-10 PROCEDURE — 99309 SBSQ NF CARE MODERATE MDM 30: CPT | Performed by: NURSE PRACTITIONER

## 2024-08-10 NOTE — PROGRESS NOTES
[place] [id number]    NAME: ROOSEVELT DASILVA                                               /AGE/SEX: 1957 - 65 - F  PHYSICIAN: Sherice Portillo MD                                      ADMIT DATE: 23  UNIT #: [id number]                                                ACCT #: [id number]                                                                    LOC//BED: [id number]                                             PHYSICIAN REPORT                                           DISCHARGE SUMMARY                                         REPORT # : [id number]  Discharge Summary  Provider  Completed By:  Sherice Portillo  23 14:45    Date of Discharge  May 19, 2023    Discharge Diagnosis    diabetic left foot wound with osteomyelitis and cellulitis:  MRI of the foot suggests osteomyelitis of the second metatarsal and phalanx as well as a septic  arthritis  Blood cultures are negative   underwent partial second ray and third metatarsal head resection- path does not mention clear  margins  Cultures with Enterococcus fecalis and Proteus, pansensitive  Continue daptomycin and meropenem and the patient is tolerating. ID placed orders, d/c planner  working on       Diabetic foot ulcer of the right great toe:  The callus in the medial aspect of the R great toe is draining pus  Culture sent,klebsiella and corynebacterium(not significant) and enterococcus feacalis. Per ID,  cont abx  X-ray shows no evidence of fracture, soft tissue swelling, 3 mm lucency at the tuft of the distal  phalanx of questionable significance.  MRI result showing +acute osteo of distal phalanx and interphalangeal jt edema  s/p partial ray amputation of large toe , await path  on abx as above  s/p left IJ tunneled cath for abx     Diabetes mellitus type 2 with  PROGRESS NOTE    Subjective   Chief complaint: Homero Stone is a 83 y.o. male who is a long term care patient being seen and evaluated for blood pressure .     HPI: reviewed blood pressures, continues with hypotension. Will further reduce lisinopril ; to continue to monitor blood pressures.   Is pleasant and talkative. Reports a good appetite. Denies any chest pain or tightness.   Nursing reports that he has a good appetite. HPI      Objective   Vital signs: 104/58-73-18-97.7    Physical Exam  Vitals and nursing note reviewed.   Constitutional:       General: He is not in acute distress.  Eyes:      Extraocular Movements: Extraocular movements intact.   Cardiovascular:      Rate and Rhythm: Normal rate and regular rhythm.   Pulmonary:      Effort: Pulmonary effort is normal.      Breath sounds: Normal breath sounds.      Comments: Lungs clear   Abdominal:      General: Bowel sounds are normal.      Palpations: Abdomen is soft.   Musculoskeletal:      Cervical back: Neck supple.      Right lower leg: No edema.      Left lower leg: No edema.   Neurological:      Mental Status: He is alert.   Psychiatric:         Mood and Affect: Mood normal.         Behavior: Behavior is cooperative.          Assessment/Plan   Problem List Items Addressed This Visit       Type 2 diabetes mellitus without complication, with long-term current use of insulin (Multi) - Primary     Glucoscan  Metformin  Insulin  No evidence of hypoglycemia   Appetite good          Essential hypertension     Remains hypotensive    Will reduce lisinopril  Monitor    Remains on   Amlodipine  Lisinopril         Weakness     Continue working with therapy            Medications, treatments, and labs reviewed  Continue medications and treatments as listed in EMR    Yocasta Diaz, APRN-CNP     diabetic nephropathy  Remains on Levemir 7 units twice daily with correctional insulin scale  Blood sugars under good control  Ongoing insulin therapy recommended in the outpatient setting    Diabetic nephropathy stage V:  Meets criteria for dialysis with metabolic acidosis, worsening creatinine, hyponatremia  Has decided to proceed with dialysis  Underwent permacath 5/12  First dialysis session on 5/13,dialysis chair set up as outpatient    vaginal candidasis  - fluconazole 150 x1    Hyponatremia:  Secondary to worsening volume overload    Peripheral vascular disease:  Arterial Doppler shows no significant flow in the posterior tibial arteries bilaterally with  probable significant stenosis of the left superficial femoral artery distally with reconstruction  Begin aspirin once permacath placed  Has not decided yet about angiogram    Hypertensive urgency:  Blood pressure was very high on presentation.  Started amlodipine 10 and hydralazine 100  -pt with itching. calcium acetate stopped, and itching has stopped      Secondary hyperparathyroidism:  Secondary to chronic kidney disease  calcitriol      Obesity:  Diet and exercise reinforced for better control of diabetes    Procedure Performed  Images: See images separately    Date/Time/Dictating Surgeon  Ruperto Cotton  5/9/23 16:31    Pre-Operative Diagnosis  Left foot abscess/cellulitis, secondary osteomyelitis    Post-Operative Diagnosis  Same as above with extension of osteomyelitis into the third metatarsal head    Procedure(s) Performed  Partial second ray amputation left foot, resection of third metatarsal head left foot    Surgeon(s)  Ruperto Cotton  5/9/23 16:31    Assistant(s)  none    Anesthesia  MAC with local    Estimated Blood Loss  Approximately 10 cc    Specimen(s) Removed  Left foot partial second ray, left foot second metatarsal clearance fragment, left foot third  metatarsal head  [facility]   FINAL DIAGNOSIS  A. Left second toe  and metatarsal bone, amputation:  Toe with ulceration and abscess formation.  Metatarsal bone with acute osteomyelitis.  B. Left second toe, metatarsal clearance fragment, biopsy:  Bone with no histopathologic abnormality.  C. Left third metatarsal head, biopsy:  Bone with no histopathologic abnormality.        Ruperto Cotton  5/16/23 15:31    Pre-Operative Diagnosis  osteomyelitis right great toe, diabetes with polyneuropathy, ulcer toe right hallux with fat  exposure.    Post-Operative Diagnosis  same    Procedure(s) Performed  Right great toe partial amputation    Surgeon(s)  Ruperto Cotton  5/16/23 15:31    Assistant(s)  Rogerio Olvera    Anesthesia  mac    Estimated Blood Loss  <5cc    Specimen(s) Removed  right great toe, clearance fragment right proximal phalanx, culture swab  [facility] Service [facility]   FINAL DIAGNOSIS  A. Right great toe, amputation:  Skin with ulceration, necrosis and abscess formation.  Underlying bone with no histopathologic abnormality.  B Right proximal phalanx, clearance fragment, bone biopsy:  Bone with no histopathologic abnormality.    Hospital Course  per  History of Present Illness (Dr. Kim)  65 years old female with PMH of DM 2, CKD stage IV, hypertension, hyperlipidemia, osteomyelitis of  left foot status post first toe amputation who has not been taking medication for a while presents  complaining of swelling, redness, pain, and copious purulent discharge from left foot that started  about a week ago but significantly worse in the last 3 to 4 days, about 5 days ago she had some  chills but never had fever.  Recently she has been having repeated vomiting and persistent nausea.  Per chart review patient had first osteomyelitis in 2012 when she had debridement, neither  osteomyelitis of left foot in 2017 when she had first toe amputation.  At that time she had stage  IV kidney disease, currently she had a stage V CKD with creatinine of 8 but without hyperkalemia or  fluid  overload.  At presentation her blood pressure was markedly elevated but with treatment in ER  blood pressure is trending down.  ESR is markedly elevated at 130, x-ray of left foot is concerning  for osteomyelitis but radiology report is pending, she was empirically started on vancomycin and  aztreonam, of note patient has multiple allergies.   Hospital course:  Patient was admitted for diabetic left foot wound with osteomyelitis and cellulitis.  Blood  cultures were negative.  Patient underwent partial second ray and third metatarsal head resection  on 5/9 per podiatry.  Pathology did not mention clear margins.  Cultures were Enterococcus faecalis  and Proteus.  Patient was on daptomycin and meropenem per infectious disease.  Then patient had  draining callus of the right great toe.  Cultures grew Klebsiella and corynebacterium and  Enterococcus faecalis.  X-ray showed no fracture but a lucency.  MRI showed acute osteomyelitis.  Patient underwent partial ray amputation of the large toe on 5/16 by podiatry.  Patient was set up  for IV antibiotics per infectious disease.  She got a left tunneled IJ catheter for antibiotics on  5/17.  She was diagnosed with diabetes mellitus and placed on an insulin regimen which was  prescribed for the outpatient setting.  She also had diabetic nephropathy that turned into ESRD.  She was started on dialysis and placed with outpatient dialysis chair.  She got a permacath on  5/20.  She had secondary hyponatremia and volume overload that improved with dialysis.  She has  peripheral vascular disease, and podiatry will arrange for outpatient angiogram to further diagnose  this if there are healing issues with the bilateral amputations.  She also had hypertensive  urgency, and was started on hypertensive regimen.  She is nonweightbearing to both legs, and will  discharge with a wheelchair and home health.  From antibiotics, she developed a vaginal yeast  infection was given fluconazole  x1.    Vital Signs  Vital Signs    Vital Signs        Date Time  Temp Pulse Resp B/P (MAP) Pulse Ox O2 Delivery O2 Flow Rate FiO2    5/19/23 13:38  79  145/62    5/19/23 12:10 97.9  16  98 Room Air        Exam  Exam    gen: nad,  alert  heent: eomi,  moist mucous membranes  Cardiovascular: S1 and S2 normal, regular rate regular and rhythm, no mumurs/gallops/rubs  Respiratory:  ctab  GI:  Bowel sounds normal x 4 quadrants, Soft, no tenderness to palpation  : no tenderness in suprapubic area. mild erythema/swelling over the inner right labia. +white  clumpy discharge  msk:BL feet wrapped. left leg is larger than right, but no ttp of calf.  Neurological:  wake Grossly within normal limits,  Skin/Hair/Nails:  No obvious rashes  Behavioral Health/Psych:  Affect normal, Mood normal, Speech normal    Condition/Disposition  Condition at Discharge  hds  Disposition at Discharge  home health    Discharge Plan  Follow-Up  Follow Up Appointments:  Follow up with primary care physician in 2 weeks.  Follow up with  in 1 week.  Follow up with Dr. Kendrick in 2 weeks.    Recommend IV daptomycin, patient is currently on 600 mg IV every 48 hour with IV meropenem 500 mg  daily, dose is  renally adjusted (she is on hemodialysis).  Patient would need antibiotic for 6  weeks.  Weekly labs which include CBC, CMP, sed rate, CRP, CPK.  Diet:  Renal diabetic diet  Activity:  Non weight bearing and heel touch only for transfers and bathroom.  Treatment/Wound Care:  Dialysis Schedule: MWF @ 4:40am  [address] [address]# [phone]  Referrals Made/Community Services Needed at Discharge: Physical Therapy, Home Health Nurse  Special Instructions:  RN: Please call [employer] #[phone] to inform of discharge and  Pls fax discharge instruction to #[phone]. thanks.    Please call [facility] [phone] to inform of pt's discharge.  Please fax dc instructions with the last 3  dialysis flow sheets to  [phone]  Equipment/Supplies:  Please call [facility] at  for your IV Antibiotics supply  needs.  Belongings Sent With Patient: Yes    Medical Equipment Ordered  Insulin Pen Needle (Bd Pen Needles Short/Ultr),  Dispense: 60 EACH  SUBCUTANEOUSLY TWICE DAILY  [id number], [address], [place], [address] [phone]  Discharge Medications    Home Medication Instructions    New Prescriptions           amLODIPine*   10 Mg   Tablet                          Dose:   10 MILLIGRAM                   ORAL, EVERY DAY for 30 Days                                    Dispense: 30 TABLET   No Refills                         Sent   to Pharm 1       B-Complex W/ C   Folic Acid 1 Mg   (Renal)  1 Cap   Capsule                          Dose:   1 EACH                   ORAL, EVERY DAY WITH   BREAKFAST for 30 Days                                    Dispense: 30   CAPSULE   No Refills                         Sent   to Pharm 1       Calcitriol*   (Rocaltrol*)  0.25   Mcg Capsule                          Dose:   0.25   MICROGRAM                   ORAL, EVERY DAY for 30 Days                                    Dispense: 30   CAPSULE   No Refills                         Sent   to Pharm 1       hydrALAZINE   50 Mg   Tablet                          Dose:   100 MILLIGRAM                   ORAL, THREE TIMES DAILY for   30 Days                                    Dispense: 180   TABLET   No Refills                         Sent   to Pharm 1       Insulin Detemir Inj*   (Levemir Inj*)  100   Unit/Ml Solution                          Dose:   10 UNITS                   SUBCUTANEOUSLY, TWICE DAILY   at 9:00AM and 9:00PM for 30   Days                                    Dispense: 60   INJECTION   No Refills                         Sent   to Pharm 1       Polyethylene Glycol   3350 (Healthylax)   17 Gm Packet                          Dose:   17 GRAM                    ORAL, EVERY DAY for 30 Days                                    Dispense: 30 PACKET   No Refills                         Sent   to Pharm 1            Continue These Medications           Acetaminophen*   (Tylenol*) 325 Mg   Tablet                          Dose:   650 MILLIGRAM                   ORAL, EVERY 6 HOURS as   needed for PAIN/ HEADACHE/   FEVER       Aspirin Ec*  81 Mg   Tablet, Enteric   Coated                          Dose:   81 MILLIGRAM                   ORAL, EVERY DAY       Ergocalciferol   50,000 Unit Capsule                          Dose:   50,000 UNITS                   ORAL, ONCE A WEEK   MONDAY MONDAY            Stop Taking These Medications           amLODIPine*  2.5 Mg   Tablet                          Dose:   2.5 MILLIGRAM                   ORAL, EVERY DAY       Bumetanide* (Bumex*)   1 Mg Tablet                          Dose:   1 MILLIGRAM                   ORAL, TWICE DAILY       Cephalexin*   (Keflex*) 250 Mg   Capsule                          Dose:   250 MILLIGRAM                   ORAL, TWICE DAILY       hydrALAZINE*  25 Mg   Tablet                          Dose:   25 MILLIGRAM                   ORAL, TWICE DAILY       Metoclopramide*  10   Mg Tablet                          Dose:   5 MILLIGRAM                   ORAL, THREE TIMES DAILY BEFORE MEALS as needed   for NAUSEA       Ondansetron Odt*  4   Mg Tablet,   Disintegrating                          Dose:   4 MILLIGRAM                   ORAL, Every 4 Hours       Spironolactone-Hct   25-25 Mg (Nfi)  1   Tab Tablet                          Dose:   0.5 TABLET                   ORAL, EVERY DAY            Pharmacy Details  Pharm 1: [id number], [address] [place], [address] [phone]    Your Preferred Pharmacy    Time Spent On Discharge  > 30 minutes        Sherice Portillo MD                       May 19, 2023 14:45    <Electronically signed by Sherice Portillo MD> 05/21/23  1540        ______________________________________________  DRAFT UNTIL SIGNED      CC: Sherice Portillo MD;

## 2024-08-10 NOTE — LETTER
Patient: Homero Stone  : 1940    Encounter Date: 08/10/2024    PROGRESS NOTE    Subjective  Chief complaint: Homero Stone is a 83 y.o. male who is a long term care patient being seen and evaluated for blood pressure .     HPI: reviewed blood pressures, continues with hypotension. Will further reduce lisinopril ; to continue to monitor blood pressures.   Is pleasant and talkative. Reports a good appetite. Denies any chest pain or tightness.   Nursing reports that he has a good appetite. HPI      Objective  Vital signs: 104/58-73-18-97.7    Physical Exam  Vitals and nursing note reviewed.   Constitutional:       General: He is not in acute distress.  Eyes:      Extraocular Movements: Extraocular movements intact.   Cardiovascular:      Rate and Rhythm: Normal rate and regular rhythm.   Pulmonary:      Effort: Pulmonary effort is normal.      Breath sounds: Normal breath sounds.      Comments: Lungs clear   Abdominal:      General: Bowel sounds are normal.      Palpations: Abdomen is soft.   Musculoskeletal:      Cervical back: Neck supple.      Right lower leg: No edema.      Left lower leg: No edema.   Neurological:      Mental Status: He is alert.   Psychiatric:         Mood and Affect: Mood normal.         Behavior: Behavior is cooperative.          Assessment/Plan  Problem List Items Addressed This Visit       Type 2 diabetes mellitus without complication, with long-term current use of insulin (Multi) - Primary     Glucoscan  Metformin  Insulin  No evidence of hypoglycemia   Appetite good          Essential hypertension     Remains hypotensive    Will reduce lisinopril  Monitor    Remains on   Amlodipine  Lisinopril         Weakness     Continue working with therapy            Medications, treatments, and labs reviewed  Continue medications and treatments as listed in EMR    COREY Roche      Electronically Signed By: COREY Roche   24  6:25 PM

## 2024-08-12 ENCOUNTER — NURSING HOME VISIT (OUTPATIENT)
Dept: POST ACUTE CARE | Facility: EXTERNAL LOCATION | Age: 84
End: 2024-08-12
Payer: COMMERCIAL

## 2024-08-12 DIAGNOSIS — R53.1 WEAKNESS: ICD-10-CM

## 2024-08-12 DIAGNOSIS — Z79.4 TYPE 2 DIABETES MELLITUS WITHOUT COMPLICATION, WITH LONG-TERM CURRENT USE OF INSULIN (MULTI): ICD-10-CM

## 2024-08-12 DIAGNOSIS — F03.90 DEMENTIA, UNSPECIFIED DEMENTIA SEVERITY, UNSPECIFIED DEMENTIA TYPE, UNSPECIFIED WHETHER BEHAVIORAL, PSYCHOTIC, OR MOOD DISTURBANCE OR ANXIETY (MULTI): Primary | ICD-10-CM

## 2024-08-12 DIAGNOSIS — E11.9 TYPE 2 DIABETES MELLITUS WITHOUT COMPLICATION, WITH LONG-TERM CURRENT USE OF INSULIN (MULTI): ICD-10-CM

## 2024-08-12 DIAGNOSIS — I10 ESSENTIAL HYPERTENSION: ICD-10-CM

## 2024-08-12 PROCEDURE — 99308 SBSQ NF CARE LOW MDM 20: CPT | Performed by: INTERNAL MEDICINE

## 2024-08-12 NOTE — LETTER
"Patient: Homero Stone  : 1940    Encounter Date: 2024    \"PROGRESS NOTE    Subjective  Chief complaint: Homero Stone is a 83 y.o. male who is an acute skilled patient being seen and evaluated for weakness    HPI:  HPI  Patient is currently working in therapy due to generalized weakness.  Therapy is working with patient on therapeutic exercise and activities to improve strength endurance range of motion to improve overall independence with functional mobility.  Patient is completing ADL tasks with supervision only.  Speech therapy working patient to address cognition.  Patient was seen and examined at the bedside, appears to be in no acute distress.    Objective  Vital signs: 123/64, 97.7, 66, 18, 98% blood sugar 144    Physical Exam  Constitutional:       General: He is not in acute distress.  Eyes:      Extraocular Movements: Extraocular movements intact.   Cardiovascular:      Rate and Rhythm: Normal rate and regular rhythm.   Pulmonary:      Effort: Pulmonary effort is normal.      Breath sounds: Normal breath sounds.   Abdominal:      General: Bowel sounds are normal.      Palpations: Abdomen is soft.   Musculoskeletal:      Cervical back: Neck supple.   Neurological:      Mental Status: He is alert.   Psychiatric:         Mood and Affect: Mood normal.         Behavior: Behavior is cooperative.         Assessment/Plan  Problem List Items Addressed This Visit       Type 2 diabetes mellitus without complication, with long-term current use of insulin (Multi)     Glucoscan  Metformin  Insulin  No evidence of hypoglycemia           Essential hypertension     Monitor blood pressure  BP at goal  Amlodipine  Lisinopril         Weakness     Continue therapy, work towards established goals         Dementia (Multi) - Primary     Assist with ADLs and care.  Monitor for safety  Aripirazole  Encourage all abilities   ST          Medications, treatments, and labs reviewed  Continue medications and " treatments as listed in EMR      Scribe Attestation  I, Rashad Lloyd   attest that this documentation has been prepared under the direction and in the presence of Martín Potts MD    Provider Attestation - Scribe documentation  All medical record entries made by the Scribe were at my direction and personally dictated by me. I have reviewed the chart and agree that the record accurately reflects my personal performance of the history, physical exam, discussion and plan.   Martín Potts MD        Electronically Signed By: Martín Potts MD   8/12/24  1:50 PM

## 2024-08-12 NOTE — PROGRESS NOTES
"\"PROGRESS NOTE    Subjective   Chief complaint: Homero Stone is a 83 y.o. male who is an acute skilled patient being seen and evaluated for weakness    HPI:  HPI  Patient is currently working in therapy due to generalized weakness.  Therapy is working with patient on therapeutic exercise and activities to improve strength endurance range of motion to improve overall independence with functional mobility.  Patient is completing ADL tasks with supervision only.  Speech therapy working patient to address cognition.  Patient was seen and examined at the bedside, appears to be in no acute distress.    Objective   Vital signs: 123/64, 97.7, 66, 18, 98% blood sugar 144    Physical Exam  Constitutional:       General: He is not in acute distress.  Eyes:      Extraocular Movements: Extraocular movements intact.   Cardiovascular:      Rate and Rhythm: Normal rate and regular rhythm.   Pulmonary:      Effort: Pulmonary effort is normal.      Breath sounds: Normal breath sounds.   Abdominal:      General: Bowel sounds are normal.      Palpations: Abdomen is soft.   Musculoskeletal:      Cervical back: Neck supple.   Neurological:      Mental Status: He is alert.   Psychiatric:         Mood and Affect: Mood normal.         Behavior: Behavior is cooperative.         Assessment/Plan   Problem List Items Addressed This Visit       Type 2 diabetes mellitus without complication, with long-term current use of insulin (Multi)     Glucoscan  Metformin  Insulin  No evidence of hypoglycemia           Essential hypertension     Monitor blood pressure  BP at goal  Amlodipine  Lisinopril         Weakness     Continue therapy, work towards established goals         Dementia (Multi) - Primary     Assist with ADLs and care.  Monitor for safety  Aripirazole  Encourage all abilities   ST          Medications, treatments, and labs reviewed  Continue medications and treatments as listed in EMR      Scribe Attestation  I, Comfort Emerson, " Scribe   attest that this documentation has been prepared under the direction and in the presence of Martín Potts MD    Provider Attestation - Scribe documentation  All medical record entries made by the Scribe were at my direction and personally dictated by me. I have reviewed the chart and agree that the record accurately reflects my personal performance of the history, physical exam, discussion and plan.   Martín Potts MD

## 2024-08-13 ENCOUNTER — NURSING HOME VISIT (OUTPATIENT)
Dept: POST ACUTE CARE | Facility: EXTERNAL LOCATION | Age: 84
End: 2024-08-13
Payer: COMMERCIAL

## 2024-08-13 DIAGNOSIS — Z79.4 TYPE 2 DIABETES MELLITUS WITHOUT COMPLICATION, WITH LONG-TERM CURRENT USE OF INSULIN (MULTI): ICD-10-CM

## 2024-08-13 DIAGNOSIS — I10 ESSENTIAL HYPERTENSION: ICD-10-CM

## 2024-08-13 DIAGNOSIS — N40.1 BENIGN LOCALIZED HYPERPLASIA OF PROSTATE WITH URINARY OBSTRUCTION AND LOWER URINARY TRACT SYMPTOMS: Primary | ICD-10-CM

## 2024-08-13 DIAGNOSIS — R53.1 WEAKNESS: ICD-10-CM

## 2024-08-13 DIAGNOSIS — F03.90 DEMENTIA, UNSPECIFIED DEMENTIA SEVERITY, UNSPECIFIED DEMENTIA TYPE, UNSPECIFIED WHETHER BEHAVIORAL, PSYCHOTIC, OR MOOD DISTURBANCE OR ANXIETY (MULTI): ICD-10-CM

## 2024-08-13 DIAGNOSIS — E11.9 TYPE 2 DIABETES MELLITUS WITHOUT COMPLICATION, WITH LONG-TERM CURRENT USE OF INSULIN (MULTI): ICD-10-CM

## 2024-08-13 DIAGNOSIS — N13.9 BENIGN LOCALIZED HYPERPLASIA OF PROSTATE WITH URINARY OBSTRUCTION AND LOWER URINARY TRACT SYMPTOMS: Primary | ICD-10-CM

## 2024-08-13 PROCEDURE — 99308 SBSQ NF CARE LOW MDM 20: CPT | Performed by: INTERNAL MEDICINE

## 2024-08-13 NOTE — PROGRESS NOTES
PROGRESS NOTE    Subjective   Chief complaint: Homero Stone is a 83 y.o. male who is an acute skilled patient being seen and evaluated for weakness    HPI:  HPI  Patient is currently working in therapy due to generalized weakness.  Therapy is working with patient on ADL tasks for upper and lower body for bathing and dressing.  Patient is requiring supervision.  Patient is seen and examined at the bedside, appears to be in no acute distress.  Nursing reporting no new concerns at this time.    Objective   Vital signs: 123/64, 97.7, 66, 18, 98% blood sugar 127    Physical Exam  Constitutional:       General: He is not in acute distress.  Eyes:      Extraocular Movements: Extraocular movements intact.   Cardiovascular:      Rate and Rhythm: Normal rate and regular rhythm.   Pulmonary:      Effort: Pulmonary effort is normal.      Breath sounds: Normal breath sounds.   Abdominal:      General: Bowel sounds are normal.      Palpations: Abdomen is soft.   Musculoskeletal:      Cervical back: Neck supple.   Neurological:      Mental Status: He is alert.   Psychiatric:         Mood and Affect: Mood normal.         Behavior: Behavior is cooperative.         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  Tamsulosin         Type 2 diabetes mellitus without complication, with long-term current use of insulin (Multi)     Glucoscan  Metformin  Insulin  No evidence of hypoglycemia           Essential hypertension     Monitor blood pressure  BP at goal  Amlodipine  Lisinopril         Weakness     Continue therapy, work towards goals         Dementia (Multi)     Assist with ADLs and care.  Monitor for safety  Aripirazole  Encourage all abilities   ST          Medications, treatments, and labs reviewed  Continue medications and treatments as listed in EMR      Scribe  Attestation  I, Rashad Lloyd   attest that this documentation has been prepared under the direction and in the presence of Martín Potts MD    Provider Attestation - Scribe documentation  All medical record entries made by the Scribe were at my direction and personally dictated by me. I have reviewed the chart and agree that the record accurately reflects my personal performance of the history, physical exam, discussion and plan.   Martín Potts MD

## 2024-08-13 NOTE — LETTER
Patient: Homero Stone  : 1940    Encounter Date: 2024    PROGRESS NOTE    Subjective  Chief complaint: Homero Stone is a 83 y.o. male who is an acute skilled patient being seen and evaluated for weakness    HPI:  HPI  Patient is currently working in therapy due to generalized weakness.  Therapy is working with patient on ADL tasks for upper and lower body for bathing and dressing.  Patient is requiring supervision.  Patient is seen and examined at the bedside, appears to be in no acute distress.  Nursing reporting no new concerns at this time.    Objective  Vital signs: 123/64, 97.7, 66, 18, 98% blood sugar 127    Physical Exam  Constitutional:       General: He is not in acute distress.  Eyes:      Extraocular Movements: Extraocular movements intact.   Cardiovascular:      Rate and Rhythm: Normal rate and regular rhythm.   Pulmonary:      Effort: Pulmonary effort is normal.      Breath sounds: Normal breath sounds.   Abdominal:      General: Bowel sounds are normal.      Palpations: Abdomen is soft.   Musculoskeletal:      Cervical back: Neck supple.   Neurological:      Mental Status: He is alert.   Psychiatric:         Mood and Affect: Mood normal.         Behavior: Behavior is cooperative.         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  Tamsulosin         Type 2 diabetes mellitus without complication, with long-term current use of insulin (Multi)     Glucoscan  Metformin  Insulin  No evidence of hypoglycemia           Essential hypertension     Monitor blood pressure  BP at goal  Amlodipine  Lisinopril         Weakness     Continue therapy, work towards goals         Dementia (Multi)     Assist with ADLs and care.  Monitor for safety  Aripirazole  Encourage all abilities   ST          Medications, treatments, and labs  reviewed  Continue medications and treatments as listed in EMR      Scribe Attestation  I, Rashad Lloyd   attest that this documentation has been prepared under the direction and in the presence of Martín Potts MD    Provider Attestation - Scribe documentation  All medical record entries made by the Scribe were at my direction and personally dictated by me. I have reviewed the chart and agree that the record accurately reflects my personal performance of the history, physical exam, discussion and plan.   Martín Potts MD        Electronically Signed By: Martín Potts MD   8/13/24  1:38 PM

## 2024-08-15 ENCOUNTER — NURSING HOME VISIT (OUTPATIENT)
Dept: POST ACUTE CARE | Facility: EXTERNAL LOCATION | Age: 84
End: 2024-08-15
Payer: COMMERCIAL

## 2024-08-15 DIAGNOSIS — R53.1 WEAKNESS: ICD-10-CM

## 2024-08-15 DIAGNOSIS — N13.9 BENIGN LOCALIZED HYPERPLASIA OF PROSTATE WITH URINARY OBSTRUCTION AND LOWER URINARY TRACT SYMPTOMS: ICD-10-CM

## 2024-08-15 DIAGNOSIS — E11.9 TYPE 2 DIABETES MELLITUS WITHOUT COMPLICATION, WITH LONG-TERM CURRENT USE OF INSULIN (MULTI): ICD-10-CM

## 2024-08-15 DIAGNOSIS — I10 ESSENTIAL HYPERTENSION: ICD-10-CM

## 2024-08-15 DIAGNOSIS — Z79.4 TYPE 2 DIABETES MELLITUS WITHOUT COMPLICATION, WITH LONG-TERM CURRENT USE OF INSULIN (MULTI): ICD-10-CM

## 2024-08-15 DIAGNOSIS — N40.1 BENIGN LOCALIZED HYPERPLASIA OF PROSTATE WITH URINARY OBSTRUCTION AND LOWER URINARY TRACT SYMPTOMS: ICD-10-CM

## 2024-08-15 DIAGNOSIS — F03.90 DEMENTIA, UNSPECIFIED DEMENTIA SEVERITY, UNSPECIFIED DEMENTIA TYPE, UNSPECIFIED WHETHER BEHAVIORAL, PSYCHOTIC, OR MOOD DISTURBANCE OR ANXIETY (MULTI): Primary | ICD-10-CM

## 2024-08-15 PROCEDURE — 99309 SBSQ NF CARE MODERATE MDM 30: CPT | Performed by: INTERNAL MEDICINE

## 2024-08-15 NOTE — PROGRESS NOTES
PROGRESS NOTE    Subjective   Chief complaint: Homero Stone is a 83 y.o. male who is an acute skilled patient being seen and evaluated for weakness    HPI:  HPI  Patient is currently working towards goals in therapy.  Therapy working with patient on completing ADLs for upper and lower body tasks.  Patient is able to complete with supervision.  Patient seen and examined at the bedside, appears to be in no acute distress.  Patient is denying chest pain, shortness of breath.  Mentation is at baseline.    Objective   Vital signs: 115/56, 97.7, 74, 18, 98% blood sugar 256    Physical Exam  Constitutional:       General: He is not in acute distress.  Eyes:      Extraocular Movements: Extraocular movements intact.   Cardiovascular:      Rate and Rhythm: Normal rate and regular rhythm.   Pulmonary:      Effort: Pulmonary effort is normal.      Breath sounds: Normal breath sounds.   Abdominal:      General: Bowel sounds are normal.      Palpations: Abdomen is soft.   Musculoskeletal:      Cervical back: Neck supple.   Neurological:      Mental Status: He is alert.   Psychiatric:         Mood and Affect: Mood normal.         Behavior: Behavior is cooperative.         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  Tamsulosin         Type 2 diabetes mellitus without complication, with long-term current use of insulin (Multi)     Glucoscan  Metformin  Insulin  No evidence of hypoglycemia           Essential hypertension     Monitor blood pressure  BP at goal  Amlodipine  Lisinopril         Weakness     Continue working towards established goals         Dementia (Multi) - Primary     Assist with ADLs and care.  Monitor for safety  Aripirazole  Encourage all abilities             Medications, treatments, and labs reviewed  Continue medications and treatments as listed in  EMR      Scribe Attestation  I, Rashad Lloyd   attest that this documentation has been prepared under the direction and in the presence of Martín Potts MD    Provider Attestation - Scribe documentation  All medical record entries made by the Scribe were at my direction and personally dictated by me. I have reviewed the chart and agree that the record accurately reflects my personal performance of the history, physical exam, discussion and plan.   Martín Potts MD

## 2024-08-15 NOTE — LETTER
Patient: Homero Stone  : 1940    Encounter Date: 08/15/2024    PROGRESS NOTE    Subjective  Chief complaint: Homero Stone is a 83 y.o. male who is an acute skilled patient being seen and evaluated for weakness    HPI:  HPI  Patient is currently working towards goals in therapy.  Therapy working with patient on completing ADLs for upper and lower body tasks.  Patient is able to complete with supervision.  Patient seen and examined at the bedside, appears to be in no acute distress.  Patient is denying chest pain, shortness of breath.  Mentation is at baseline.    Objective  Vital signs: 115/56, 97.7, 74, 18, 98% blood sugar 256    Physical Exam  Constitutional:       General: He is not in acute distress.  Eyes:      Extraocular Movements: Extraocular movements intact.   Cardiovascular:      Rate and Rhythm: Normal rate and regular rhythm.   Pulmonary:      Effort: Pulmonary effort is normal.      Breath sounds: Normal breath sounds.   Abdominal:      General: Bowel sounds are normal.      Palpations: Abdomen is soft.   Musculoskeletal:      Cervical back: Neck supple.   Neurological:      Mental Status: He is alert.   Psychiatric:         Mood and Affect: Mood normal.         Behavior: Behavior is cooperative.         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  Tamsulosin         Type 2 diabetes mellitus without complication, with long-term current use of insulin (Multi)     Glucoscan  Metformin  Insulin  No evidence of hypoglycemia           Essential hypertension     Monitor blood pressure  BP at goal  Amlodipine  Lisinopril         Weakness     Continue working towards established goals         Dementia (Multi) - Primary     Assist with ADLs and care.  Monitor for safety  Aripirazole  Encourage all abilities             Medications, treatments, and labs  reviewed  Continue medications and treatments as listed in EMR      Scribe Attestation  I, Rashad Lloyd   attest that this documentation has been prepared under the direction and in the presence of Martín Potts MD    Provider Attestation - Scribe documentation  All medical record entries made by the Scribe were at my direction and personally dictated by me. I have reviewed the chart and agree that the record accurately reflects my personal performance of the history, physical exam, discussion and plan.   Martín Potts MD        Electronically Signed By: Martín Potts MD   8/15/24 11:37 AM

## 2024-08-19 ENCOUNTER — NURSING HOME VISIT (OUTPATIENT)
Dept: POST ACUTE CARE | Facility: EXTERNAL LOCATION | Age: 84
End: 2024-08-19
Payer: COMMERCIAL

## 2024-08-19 DIAGNOSIS — R53.1 WEAKNESS: ICD-10-CM

## 2024-08-19 DIAGNOSIS — I10 ESSENTIAL HYPERTENSION: ICD-10-CM

## 2024-08-19 DIAGNOSIS — E11.9 TYPE 2 DIABETES MELLITUS WITHOUT COMPLICATION, WITH LONG-TERM CURRENT USE OF INSULIN (MULTI): ICD-10-CM

## 2024-08-19 DIAGNOSIS — F03.90 DEMENTIA, UNSPECIFIED DEMENTIA SEVERITY, UNSPECIFIED DEMENTIA TYPE, UNSPECIFIED WHETHER BEHAVIORAL, PSYCHOTIC, OR MOOD DISTURBANCE OR ANXIETY (MULTI): Primary | ICD-10-CM

## 2024-08-19 DIAGNOSIS — N40.1 BENIGN LOCALIZED HYPERPLASIA OF PROSTATE WITH URINARY OBSTRUCTION AND LOWER URINARY TRACT SYMPTOMS: ICD-10-CM

## 2024-08-19 DIAGNOSIS — Z79.4 TYPE 2 DIABETES MELLITUS WITHOUT COMPLICATION, WITH LONG-TERM CURRENT USE OF INSULIN (MULTI): ICD-10-CM

## 2024-08-19 DIAGNOSIS — N13.9 BENIGN LOCALIZED HYPERPLASIA OF PROSTATE WITH URINARY OBSTRUCTION AND LOWER URINARY TRACT SYMPTOMS: ICD-10-CM

## 2024-08-19 PROCEDURE — 99308 SBSQ NF CARE LOW MDM 20: CPT | Performed by: INTERNAL MEDICINE

## 2024-08-19 NOTE — PROGRESS NOTES
PROGRESS NOTE    Subjective   Chief complaint: Homero Stone is a 83 y.o. male who is an acute skilled patient being seen and evaluated for weakness    HPI:  HPI  Patient is currently working in therapy, working towards established goals.  Patient is improving with therapy.  Patient is working on gait training, is able to ambulate with moderate independence.  Therapy is also working with patient on safe transfers and patient is completing with moderate independence from various surfaces.  Speech therapy is working patient to address cognition.  Patient seen and examined at the bedside, appears to be in no acute distress.  Nursing reporting no new concerns.    Objective   Vital signs: 100/70, 97.7, 70, 18, 98% blood sugar 164    Physical Exam  Constitutional:       General: He is not in acute distress.  Eyes:      Extraocular Movements: Extraocular movements intact.   Cardiovascular:      Rate and Rhythm: Normal rate and regular rhythm.   Pulmonary:      Effort: Pulmonary effort is normal.      Breath sounds: Normal breath sounds.   Abdominal:      General: Bowel sounds are normal.      Palpations: Abdomen is soft.   Musculoskeletal:      Cervical back: Neck supple.   Neurological:      Mental Status: He is alert.   Psychiatric:         Mood and Affect: Mood normal.         Behavior: Behavior is cooperative.         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  Tamsulosin         Type 2 diabetes mellitus without complication, with long-term current use of insulin (Multi)     Glucoscan  Metformin  Insulin  No evidence of hypoglycemia           Essential hypertension     Monitor blood pressure  BP at goal  Amlodipine  Lisinopril         Weakness     Continue working in therapy, improving         Dementia (Multi) - Primary     Assist with ADLs and care.  Monitor for  safety  Aripirazole  Encourage all abilities               Medications, treatments, and labs reviewed  Continue medications and treatments as listed in EMR      Scribe Attestation  I, Rashad Lloyd   attest that this documentation has been prepared under the direction and in the presence of Martín Potts MD    Provider Attestation - Scribe documentation  All medical record entries made by the Scribe were at my direction and personally dictated by me. I have reviewed the chart and agree that the record accurately reflects my personal performance of the history, physical exam, discussion and plan.   Martín Potts MD

## 2024-08-19 NOTE — LETTER
Patient: Homero Stone  : 1940    Encounter Date: 2024    PROGRESS NOTE    Subjective  Chief complaint: Homero Stone is a 83 y.o. male who is an acute skilled patient being seen and evaluated for weakness    HPI:  HPI  Patient is currently working in therapy, working towards established goals.  Patient is improving with therapy.  Patient is working on gait training, is able to ambulate with moderate independence.  Therapy is also working with patient on safe transfers and patient is completing with moderate independence from various surfaces.  Speech therapy is working patient to address cognition.  Patient seen and examined at the bedside, appears to be in no acute distress.  Nursing reporting no new concerns.    Objective  Vital signs: 100/70, 97.7, 70, 18, 98% blood sugar 164    Physical Exam  Constitutional:       General: He is not in acute distress.  Eyes:      Extraocular Movements: Extraocular movements intact.   Cardiovascular:      Rate and Rhythm: Normal rate and regular rhythm.   Pulmonary:      Effort: Pulmonary effort is normal.      Breath sounds: Normal breath sounds.   Abdominal:      General: Bowel sounds are normal.      Palpations: Abdomen is soft.   Musculoskeletal:      Cervical back: Neck supple.   Neurological:      Mental Status: He is alert.   Psychiatric:         Mood and Affect: Mood normal.         Behavior: Behavior is cooperative.         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  Tamsulosin         Type 2 diabetes mellitus without complication, with long-term current use of insulin (Multi)     Glucoscan  Metformin  Insulin  No evidence of hypoglycemia           Essential hypertension     Monitor blood pressure  BP at goal  Amlodipine  Lisinopril         Weakness     Continue working in therapy, improving          Dementia (Multi) - Primary     Assist with ADLs and care.  Monitor for safety  Aripirazole  Encourage all abilities               Medications, treatments, and labs reviewed  Continue medications and treatments as listed in EMR      Scribe Attestation  [place] Scribe   attest that this documentation has been prepared under the direction and in the presence of Martín Potts MD    Provider Attestation - Scribe documentation  All medical record entries made by the Scribe were at my direction and personally dictated by me. I have reviewed the chart and agree that the record accurately reflects my personal performance of the history, physical exam, discussion and plan.   Martín Potts MD        Electronically Signed By: Martín Potts MD   8/19/24  1:19 PM

## 2024-08-20 ENCOUNTER — NURSING HOME VISIT (OUTPATIENT)
Dept: POST ACUTE CARE | Facility: EXTERNAL LOCATION | Age: 84
End: 2024-08-20
Payer: COMMERCIAL

## 2024-08-20 DIAGNOSIS — N13.9 BENIGN LOCALIZED HYPERPLASIA OF PROSTATE WITH URINARY OBSTRUCTION AND LOWER URINARY TRACT SYMPTOMS: ICD-10-CM

## 2024-08-20 DIAGNOSIS — E11.9 TYPE 2 DIABETES MELLITUS WITHOUT COMPLICATION, WITH LONG-TERM CURRENT USE OF INSULIN (MULTI): ICD-10-CM

## 2024-08-20 DIAGNOSIS — I10 ESSENTIAL HYPERTENSION: ICD-10-CM

## 2024-08-20 DIAGNOSIS — N40.1 BENIGN LOCALIZED HYPERPLASIA OF PROSTATE WITH URINARY OBSTRUCTION AND LOWER URINARY TRACT SYMPTOMS: ICD-10-CM

## 2024-08-20 DIAGNOSIS — R53.1 WEAKNESS: ICD-10-CM

## 2024-08-20 DIAGNOSIS — Z79.4 TYPE 2 DIABETES MELLITUS WITHOUT COMPLICATION, WITH LONG-TERM CURRENT USE OF INSULIN (MULTI): ICD-10-CM

## 2024-08-20 DIAGNOSIS — F03.90 DEMENTIA, UNSPECIFIED DEMENTIA SEVERITY, UNSPECIFIED DEMENTIA TYPE, UNSPECIFIED WHETHER BEHAVIORAL, PSYCHOTIC, OR MOOD DISTURBANCE OR ANXIETY (MULTI): Primary | ICD-10-CM

## 2024-08-20 PROCEDURE — 99308 SBSQ NF CARE LOW MDM 20: CPT | Performed by: INTERNAL MEDICINE

## 2024-08-20 NOTE — ASSESSMENT & PLAN NOTE
Assist with ADLs and care.  Monitor for safety  Aripirazole  Encourage all abilities   Speech therapy

## 2024-08-20 NOTE — LETTER
Patient: Homero Stone  : 1940    Encounter Date: 2024    PROGRESS NOTE    Subjective  Chief complaint: Homero Stone is a 83 y.o. male who is an acute skilled patient being seen and evaluated for weakness    HPI:  HPI  Patient is progressing towards established goals and therapy.  Patient is completing transfers safely from various surfaces with moderate independence.  Patient is ambulating increasing distances with moderate independence.  Patient is able to complete ADLs with supervision for upper and lower body tasks.  Speech therapy working patient to address cognition.  Patient seen and examined at the bedside, appears to be in no acute distress.  Patient denies chest pain shortness breath nausea vomiting fever chills.    Objective  Vital signs: 106/58, 97.7, 72, 18, 98% blood sugar 234    Physical Exam  Constitutional:       General: He is not in acute distress.  Eyes:      Extraocular Movements: Extraocular movements intact.   Cardiovascular:      Rate and Rhythm: Normal rate and regular rhythm.   Pulmonary:      Effort: Pulmonary effort is normal.      Breath sounds: Normal breath sounds.   Abdominal:      General: Bowel sounds are normal.      Palpations: Abdomen is soft.   Musculoskeletal:      Cervical back: Neck supple.   Neurological:      Mental Status: He is alert.   Psychiatric:         Mood and Affect: Mood normal.         Behavior: Behavior is cooperative.         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  Tamsulosin         Type 2 diabetes mellitus without complication, with long-term current use of insulin (Multi)     Glucoscan  Metformin  Insulin  No evidence of hypoglycemia           Essential hypertension     Monitor blood pressure  BP at goal  Amlodipine  Lisinopril         Weakness     Continue therapy, progressing towards  goals and improving         Dementia (Multi) - Primary     Assist with ADLs and care.  Monitor for safety  Aripirazole  Encourage all abilities   Speech therapy            Medications, treatments, and labs reviewed  Continue medications and treatments as listed in EMR      Scribe Attestation  [employer] CHATTER[employer] Scribe   attest that this documentation has been prepared under the direction and in the presence of Martín Potts MD    Provider Attestation - Scribe documentation  All medical record entries made by the Scribe were at my direction and personally dictated by me. I have reviewed the chart and agree that the record accurately reflects my personal performance of the history, physical exam, discussion and plan.   Martín Potts MD        Electronically Signed By: Martín Potts MD   8/20/24  2:50 PM

## 2024-08-20 NOTE — PROGRESS NOTES
PROGRESS NOTE    Subjective   Chief complaint: Homero Stone is a 83 y.o. male who is an acute skilled patient being seen and evaluated for weakness    HPI:  HPI  Patient is progressing towards established goals and therapy.  Patient is completing transfers safely from various surfaces with moderate independence.  Patient is ambulating increasing distances with moderate independence.  Patient is able to complete ADLs with supervision for upper and lower body tasks.  Speech therapy working patient to address cognition.  Patient seen and examined at the bedside, appears to be in no acute distress.  Patient denies chest pain shortness breath nausea vomiting fever chills.    Objective   Vital signs: 106/58, 97.7, 72, 18, 98% blood sugar 234    Physical Exam  Constitutional:       General: He is not in acute distress.  Eyes:      Extraocular Movements: Extraocular movements intact.   Cardiovascular:      Rate and Rhythm: Normal rate and regular rhythm.   Pulmonary:      Effort: Pulmonary effort is normal.      Breath sounds: Normal breath sounds.   Abdominal:      General: Bowel sounds are normal.      Palpations: Abdomen is soft.   Musculoskeletal:      Cervical back: Neck supple.   Neurological:      Mental Status: He is alert.   Psychiatric:         Mood and Affect: Mood normal.         Behavior: Behavior is cooperative.         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  Tamsulosin         Type 2 diabetes mellitus without complication, with long-term current use of insulin (Multi)     Glucoscan  Metformin  Insulin  No evidence of hypoglycemia           Essential hypertension     Monitor blood pressure  BP at goal  Amlodipine  Lisinopril         Weakness     Continue therapy, progressing towards goals and improving         Dementia (Multi) - Primary     Assist with ADLs  and care.  Monitor for safety  Aripirazole  Encourage all abilities   Speech therapy            Medications, treatments, and labs reviewed  Continue medications and treatments as listed in EMR      Scribe Attestation  I, Rashad Lloyd   attest that this documentation has been prepared under the direction and in the presence of Martín Potts MD    Provider Attestation - Scribe documentation  All medical record entries made by the Scribe were at my direction and personally dictated by me. I have reviewed the chart and agree that the record accurately reflects my personal performance of the history, physical exam, discussion and plan.   Martín Potts MD

## 2024-08-22 ENCOUNTER — NURSING HOME VISIT (OUTPATIENT)
Dept: POST ACUTE CARE | Facility: EXTERNAL LOCATION | Age: 84
End: 2024-08-22
Payer: COMMERCIAL

## 2024-08-22 DIAGNOSIS — R53.1 WEAKNESS: ICD-10-CM

## 2024-08-22 DIAGNOSIS — I10 ESSENTIAL HYPERTENSION: ICD-10-CM

## 2024-08-22 DIAGNOSIS — F03.90 DEMENTIA, UNSPECIFIED DEMENTIA SEVERITY, UNSPECIFIED DEMENTIA TYPE, UNSPECIFIED WHETHER BEHAVIORAL, PSYCHOTIC, OR MOOD DISTURBANCE OR ANXIETY (MULTI): Primary | ICD-10-CM

## 2024-08-22 DIAGNOSIS — Z79.4 TYPE 2 DIABETES MELLITUS WITHOUT COMPLICATION, WITH LONG-TERM CURRENT USE OF INSULIN (MULTI): ICD-10-CM

## 2024-08-22 DIAGNOSIS — E11.9 TYPE 2 DIABETES MELLITUS WITHOUT COMPLICATION, WITH LONG-TERM CURRENT USE OF INSULIN (MULTI): ICD-10-CM

## 2024-08-22 PROCEDURE — 99308 SBSQ NF CARE LOW MDM 20: CPT | Performed by: INTERNAL MEDICINE

## 2024-08-22 NOTE — LETTER
Patient: Homero Stone  : 1940    Encounter Date: 2024    PROGRESS NOTE    Subjective  Chief complaint: Homero Stone is a 83 y.o. male who is an acute skilled patient being seen and evaluated for weakness    HPI:  HPI  Patient is currently working in therapy, working on improving with gait training, ambulating increasing distances.  Patient is able to complete ambulation with moderate independence.  Patient is also working on safely transferring from various surfaces, completing with mod independence.  Speech therapy is working with patient to address cognition.  ADLs are being completed with supervision for upper and lower body.  Patient denies chest pain, shortness of breath, nausea vomiting fever chills.    Objective  Vital signs: 140/80, 97.7, 84, 18, 98% blood sugar 216    Physical Exam  Constitutional:       General: He is not in acute distress.  Eyes:      Extraocular Movements: Extraocular movements intact.   Cardiovascular:      Rate and Rhythm: Normal rate and regular rhythm.   Pulmonary:      Effort: Pulmonary effort is normal.      Breath sounds: Normal breath sounds.   Abdominal:      General: Bowel sounds are normal.      Palpations: Abdomen is soft.   Musculoskeletal:      Cervical back: Neck supple.   Neurological:      Mental Status: He is alert.   Psychiatric:         Mood and Affect: Mood normal.         Behavior: Behavior is cooperative.         Assessment/Plan  Problem List Items Addressed This Visit       Type 2 diabetes mellitus without complication, with long-term current use of insulin (Multi)     Glucoscan  Metformin  Insulin  Monitor labs           Essential hypertension     Monitor blood pressure  Amlodipine  Lisinopril         Weakness     Improving with therapy, continue         Dementia (Multi) - Primary     Assist with ADLs and care.  Monitor for safety  Aripirazole  Encourage all abilities   Speech therapy              Medications, treatments, and labs  reviewed  Continue medications and treatments as listed in EMR      Scribe Attestation  I, Rashad Lloyd   attest that this documentation has been prepared under the direction and in the presence of Martín Potts MD    Provider Attestation - Scribe documentation  All medical record entries made by the Scribe were at my direction and personally dictated by me. I have reviewed the chart and agree that the record accurately reflects my personal performance of the history, physical exam, discussion and plan.   Martín Potts MD        Electronically Signed By: Martín Potts MD   8/22/24  1:59 PM

## 2024-08-22 NOTE — PROGRESS NOTES
PROGRESS NOTE    Subjective   Chief complaint: Homero Stone is a 83 y.o. male who is an acute skilled patient being seen and evaluated for weakness    HPI:  HPI  Patient is currently working in therapy, working on improving with gait training, ambulating increasing distances.  Patient is able to complete ambulation with moderate independence.  Patient is also working on safely transferring from various surfaces, completing with mod independence.  Speech therapy is working with patient to address cognition.  ADLs are being completed with supervision for upper and lower body.  Patient denies chest pain, shortness of breath, nausea vomiting fever chills.    Objective   Vital signs: 140/80, 97.7, 84, 18, 98% blood sugar 216    Physical Exam  Constitutional:       General: He is not in acute distress.  Eyes:      Extraocular Movements: Extraocular movements intact.   Cardiovascular:      Rate and Rhythm: Normal rate and regular rhythm.   Pulmonary:      Effort: Pulmonary effort is normal.      Breath sounds: Normal breath sounds.   Abdominal:      General: Bowel sounds are normal.      Palpations: Abdomen is soft.   Musculoskeletal:      Cervical back: Neck supple.   Neurological:      Mental Status: He is alert.   Psychiatric:         Mood and Affect: Mood normal.         Behavior: Behavior is cooperative.         Assessment/Plan   Problem List Items Addressed This Visit       Type 2 diabetes mellitus without complication, with long-term current use of insulin (Multi)     Glucoscan  Metformin  Insulin  Monitor labs           Essential hypertension     Monitor blood pressure  Amlodipine  Lisinopril         Weakness     Improving with therapy, continue         Dementia (Multi) - Primary     Assist with ADLs and care.  Monitor for safety  Aripirazole  Encourage all abilities   Speech therapy              Medications, treatments, and labs reviewed  Continue medications and treatments as listed in EMR      Scribe  Attestation  I, Rashad Lloyd   attest that this documentation has been prepared under the direction and in the presence of Martín Potts MD    Provider Attestation - Scribe documentation  All medical record entries made by the Scribe were at my direction and personally dictated by me. I have reviewed the chart and agree that the record accurately reflects my personal performance of the history, physical exam, discussion and plan.   Martín Potts MD

## 2024-08-26 ENCOUNTER — NURSING HOME VISIT (OUTPATIENT)
Dept: POST ACUTE CARE | Facility: EXTERNAL LOCATION | Age: 84
End: 2024-08-26
Payer: COMMERCIAL

## 2024-08-26 DIAGNOSIS — I10 ESSENTIAL HYPERTENSION: ICD-10-CM

## 2024-08-26 DIAGNOSIS — N13.9 BENIGN LOCALIZED HYPERPLASIA OF PROSTATE WITH URINARY OBSTRUCTION AND LOWER URINARY TRACT SYMPTOMS: ICD-10-CM

## 2024-08-26 DIAGNOSIS — E11.9 TYPE 2 DIABETES MELLITUS WITHOUT COMPLICATION, WITH LONG-TERM CURRENT USE OF INSULIN (MULTI): ICD-10-CM

## 2024-08-26 DIAGNOSIS — Z79.4 TYPE 2 DIABETES MELLITUS WITHOUT COMPLICATION, WITH LONG-TERM CURRENT USE OF INSULIN (MULTI): ICD-10-CM

## 2024-08-26 DIAGNOSIS — N40.1 BENIGN LOCALIZED HYPERPLASIA OF PROSTATE WITH URINARY OBSTRUCTION AND LOWER URINARY TRACT SYMPTOMS: ICD-10-CM

## 2024-08-26 DIAGNOSIS — R53.1 WEAKNESS: ICD-10-CM

## 2024-08-26 DIAGNOSIS — F03.90 DEMENTIA, UNSPECIFIED DEMENTIA SEVERITY, UNSPECIFIED DEMENTIA TYPE, UNSPECIFIED WHETHER BEHAVIORAL, PSYCHOTIC, OR MOOD DISTURBANCE OR ANXIETY (MULTI): Primary | ICD-10-CM

## 2024-08-26 PROCEDURE — 99308 SBSQ NF CARE LOW MDM 20: CPT | Performed by: INTERNAL MEDICINE

## 2024-08-26 NOTE — LETTER
Patient: Homero Stone  : 1940    Encounter Date: 2024    PROGRESS NOTE    Subjective  Chief complaint: Homero Stone is a 83 y.o. male who is an acute skilled patient being seen and evaluated for weakness    HPI:  HPI  Patient is actively participating in therapy, improving and working towards established goals.  Patient is ambulating with no assistive device.  Therapy is working with patient on ambulating increasing distances.  Patient is also completing ADL tasks for upper and lower body requiring supervision only.  Patient seen and examined at the bedside, appears to be in no acute distress.  Nursing staff voicing no new concerns.    Objective  Vital signs: 100/51, 97.7, 75, 18, 98% blood sugar 138    Physical Exam  Constitutional:       General: He is not in acute distress.  Eyes:      Extraocular Movements: Extraocular movements intact.   Cardiovascular:      Rate and Rhythm: Normal rate and regular rhythm.   Pulmonary:      Effort: Pulmonary effort is normal.      Breath sounds: Normal breath sounds.   Abdominal:      General: Bowel sounds are normal.      Palpations: Abdomen is soft.   Musculoskeletal:      Cervical back: Neck supple.   Neurological:      Mental Status: He is alert.   Psychiatric:         Mood and Affect: Mood normal.         Behavior: Behavior is cooperative.         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  Tamsulosin         Type 2 diabetes mellitus without complication, with long-term current use of insulin (Multi)     Glucoscan  Metformin  Insulin  Monitor labs           Essential hypertension     Monitor blood pressure  Amlodipine  Lisinopril  BP at goal         Weakness     Actively participating in therapy continue progressing         Dementia (Multi) - Primary     Assist with ADLs and care.  Monitor for  safety  Aripirazole  Encourage all abilities   Speech therapy              Medications, treatments, and labs reviewed  Continue medications and treatments as listed in EMR      Scribe Attestation  I, Rashad Lloyd   attest that this documentation has been prepared under the direction and in the presence of Martín Potts MD    Provider Attestation - Scribe documentation  All medical record entries made by the Scribe were at my direction and personally dictated by me. I have reviewed the chart and agree that the record accurately reflects my personal performance of the history, physical exam, discussion and plan.   Martín Potts MD        Electronically Signed By: Martín Potts MD   8/26/24  4:39 PM

## 2024-08-26 NOTE — PROGRESS NOTES
PROGRESS NOTE    Subjective   Chief complaint: Homero Stone is a 83 y.o. male who is an acute skilled patient being seen and evaluated for weakness    HPI:  HPI  Patient is actively participating in therapy, improving and working towards established goals.  Patient is ambulating with no assistive device.  Therapy is working with patient on ambulating increasing distances.  Patient is also completing ADL tasks for upper and lower body requiring supervision only.  Patient seen and examined at the bedside, appears to be in no acute distress.  Nursing staff voicing no new concerns.    Objective   Vital signs: 100/51, 97.7, 75, 18, 98% blood sugar 138    Physical Exam  Constitutional:       General: He is not in acute distress.  Eyes:      Extraocular Movements: Extraocular movements intact.   Cardiovascular:      Rate and Rhythm: Normal rate and regular rhythm.   Pulmonary:      Effort: Pulmonary effort is normal.      Breath sounds: Normal breath sounds.   Abdominal:      General: Bowel sounds are normal.      Palpations: Abdomen is soft.   Musculoskeletal:      Cervical back: Neck supple.   Neurological:      Mental Status: He is alert.   Psychiatric:         Mood and Affect: Mood normal.         Behavior: Behavior is cooperative.         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  Tamsulosin         Type 2 diabetes mellitus without complication, with long-term current use of insulin (Multi)     Glucoscan  Metformin  Insulin  Monitor labs           Essential hypertension     Monitor blood pressure  Amlodipine  Lisinopril  BP at goal         Weakness     Actively participating in therapy continue progressing         Dementia (Multi) - Primary     Assist with ADLs and care.  Monitor for safety  Aripirazole  Encourage all abilities   Speech therapy               Medications, treatments, and labs reviewed  Continue medications and treatments as listed in EMR      Scribe Attestation  I, Rashad Lloyd   attest that this documentation has been prepared under the direction and in the presence of Martín Potts MD    Provider Attestation - Scribe documentation  All medical record entries made by the Scribe were at my direction and personally dictated by me. I have reviewed the chart and agree that the record accurately reflects my personal performance of the history, physical exam, discussion and plan.   Martín Potts MD

## 2024-08-27 ENCOUNTER — NURSING HOME VISIT (OUTPATIENT)
Dept: POST ACUTE CARE | Facility: EXTERNAL LOCATION | Age: 84
End: 2024-08-27
Payer: COMMERCIAL

## 2024-08-27 DIAGNOSIS — E11.9 TYPE 2 DIABETES MELLITUS WITHOUT COMPLICATION, WITH LONG-TERM CURRENT USE OF INSULIN (MULTI): ICD-10-CM

## 2024-08-27 DIAGNOSIS — F03.90 DEMENTIA, UNSPECIFIED DEMENTIA SEVERITY, UNSPECIFIED DEMENTIA TYPE, UNSPECIFIED WHETHER BEHAVIORAL, PSYCHOTIC, OR MOOD DISTURBANCE OR ANXIETY (MULTI): Primary | ICD-10-CM

## 2024-08-27 DIAGNOSIS — Z79.4 TYPE 2 DIABETES MELLITUS WITHOUT COMPLICATION, WITH LONG-TERM CURRENT USE OF INSULIN (MULTI): ICD-10-CM

## 2024-08-27 DIAGNOSIS — I10 ESSENTIAL HYPERTENSION: ICD-10-CM

## 2024-08-27 DIAGNOSIS — R53.1 WEAKNESS: ICD-10-CM

## 2024-08-27 PROCEDURE — 99308 SBSQ NF CARE LOW MDM 20: CPT | Performed by: INTERNAL MEDICINE

## 2024-08-27 NOTE — PROGRESS NOTES
PROGRESS NOTE    Subjective   Chief complaint: Homero Stone is a 83 y.o. male who is an acute skilled patient being seen and evaluated for weakness    HPI:  HPI  Patient is currently working towards established goals with therapy.  Therapy is continue to work with patient on improving with gait training and is able to complete with moderate independence.  Patient is improving with ADLs for upper and lower body bathing and dressing and was requiring supervision only.  Speech therapy working patient to address cognition.  Patient is seen and examined at the bedside.  Patient reports no new concerns at this time.  Patient is denying chest pain, shortness of breath, nausea vomiting fever chills.    Objective   Vital signs: 97/62, 97.7, 71, 18, 98% blood sugar 190    Physical Exam  Constitutional:       General: He is not in acute distress.  Eyes:      Extraocular Movements: Extraocular movements intact.   Cardiovascular:      Rate and Rhythm: Normal rate and regular rhythm.   Pulmonary:      Effort: Pulmonary effort is normal.      Breath sounds: Normal breath sounds.   Abdominal:      General: Bowel sounds are normal.      Palpations: Abdomen is soft.   Musculoskeletal:      Cervical back: Neck supple.   Neurological:      Mental Status: He is alert.   Psychiatric:         Mood and Affect: Mood normal.         Behavior: Behavior is cooperative.         Assessment/Plan   Problem List Items Addressed This Visit       Type 2 diabetes mellitus without complication, with long-term current use of insulin (Multi)     Glucoscan  Metformin  Insulin  Monitor labs           Essential hypertension     Monitor blood pressure  Amlodipine  Lisinopril  BP at goal         Weakness     Continue therapy, work towards goals         Dementia (Multi) - Primary     Assist with ADLs and care.  Monitor for safety  Aripirazole  Encourage all abilities   Speech therapy          Medications, treatments, and labs reviewed  Continue  medications and treatments as listed in EMR      Scribe Attestation  I, Rashad Lloyd   attest that this documentation has been prepared under the direction and in the presence of Martín Ptots MD    Provider Attestation - Scribe documentation  All medical record entries made by the Scribe were at my direction and personally dictated by me. I have reviewed the chart and agree that the record accurately reflects my personal performance of the history, physical exam, discussion and plan.   Martín Potts MD

## 2024-08-27 NOTE — LETTER
Patient: Homero Stone  : 1940    Encounter Date: 2024    PROGRESS NOTE    Subjective  Chief complaint: Homero Stone is a 83 y.o. male who is an acute skilled patient being seen and evaluated for weakness    HPI:  HPI  Patient is currently working towards established goals with therapy.  Therapy is continue to work with patient on improving with gait training and is able to complete with moderate independence.  Patient is improving with ADLs for upper and lower body bathing and dressing and was requiring supervision only.  Speech therapy working patient to address cognition.  Patient is seen and examined at the bedside.  Patient reports no new concerns at this time.  Patient is denying chest pain, shortness of breath, nausea vomiting fever chills.    Objective  Vital signs: 97/62, 97.7, 71, 18, 98% blood sugar 190    Physical Exam  Constitutional:       General: He is not in acute distress.  Eyes:      Extraocular Movements: Extraocular movements intact.   Cardiovascular:      Rate and Rhythm: Normal rate and regular rhythm.   Pulmonary:      Effort: Pulmonary effort is normal.      Breath sounds: Normal breath sounds.   Abdominal:      General: Bowel sounds are normal.      Palpations: Abdomen is soft.   Musculoskeletal:      Cervical back: Neck supple.   Neurological:      Mental Status: He is alert.   Psychiatric:         Mood and Affect: Mood normal.         Behavior: Behavior is cooperative.         Assessment/Plan  Problem List Items Addressed This Visit       Type 2 diabetes mellitus without complication, with long-term current use of insulin (Multi)     Glucoscan  Metformin  Insulin  Monitor labs           Essential hypertension     Monitor blood pressure  Amlodipine  Lisinopril  BP at goal         Weakness     Continue therapy, work towards goals         Dementia (Multi) - Primary     Assist with ADLs and care.  Monitor for safety  Aripirazole  Encourage all abilities   Speech  therapy          Medications, treatments, and labs reviewed  Continue medications and treatments as listed in EMR      Scribe Attestation  I, Rashad Lloyd   attest that this documentation has been prepared under the direction and in the presence of Martín Potts MD    Provider Attestation - Scribe documentation  All medical record entries made by the Scribe were at my direction and personally dictated by me. I have reviewed the chart and agree that the record accurately reflects my personal performance of the history, physical exam, discussion and plan.   Martín Potts MD        Electronically Signed By: Martín Potts MD   8/27/24  4:35 PM

## 2024-08-28 ENCOUNTER — NURSING HOME VISIT (OUTPATIENT)
Dept: POST ACUTE CARE | Facility: EXTERNAL LOCATION | Age: 84
End: 2024-08-28
Payer: COMMERCIAL

## 2024-08-28 DIAGNOSIS — E11.9 TYPE 2 DIABETES MELLITUS WITHOUT COMPLICATION, WITH LONG-TERM CURRENT USE OF INSULIN (MULTI): Primary | ICD-10-CM

## 2024-08-28 DIAGNOSIS — Z79.4 TYPE 2 DIABETES MELLITUS WITHOUT COMPLICATION, WITH LONG-TERM CURRENT USE OF INSULIN (MULTI): Primary | ICD-10-CM

## 2024-08-28 DIAGNOSIS — I10 ESSENTIAL HYPERTENSION: ICD-10-CM

## 2024-08-28 DIAGNOSIS — F03.90 DEMENTIA, UNSPECIFIED DEMENTIA SEVERITY, UNSPECIFIED DEMENTIA TYPE, UNSPECIFIED WHETHER BEHAVIORAL, PSYCHOTIC, OR MOOD DISTURBANCE OR ANXIETY (MULTI): ICD-10-CM

## 2024-08-28 DIAGNOSIS — E78.5 HYPERLIPIDEMIA, UNSPECIFIED HYPERLIPIDEMIA TYPE: ICD-10-CM

## 2024-08-28 PROCEDURE — 99309 SBSQ NF CARE MODERATE MDM 30: CPT | Performed by: NURSE PRACTITIONER

## 2024-08-28 NOTE — LETTER
Patient: Homero Stone  : 1940    Encounter Date: 2024    PROGRESS NOTE    Subjective  Chief complaint: Homero Stone is a 83 y.o. male who is a long term care patient being seen and evaluated for assessment financial competence     HPI: Is sitting up on the side of his bed. Is oriented to his name and immediate environment. Disoriented to day and season. Nursing reports that he requires extensive assistance for HOC.    Appetite is consistent.   Completed assessment for financial competence  HPI      Objective  Vital signs: 118/70-70-18-97.7     Physical Exam  Vitals and nursing note reviewed.   Constitutional:       General: He is not in acute distress.  Eyes:      Extraocular Movements: Extraocular movements intact.   Cardiovascular:      Rate and Rhythm: Normal rate and regular rhythm.   Pulmonary:      Effort: Pulmonary effort is normal.      Breath sounds: Normal breath sounds.      Comments: Lungs clear   Abdominal:      General: Bowel sounds are normal.      Palpations: Abdomen is soft.   Musculoskeletal:      Cervical back: Neck supple.      Right lower leg: No edema.      Left lower leg: No edema.   Neurological:      Mental Status: He is alert.   Psychiatric:         Mood and Affect: Mood normal.         Behavior: Behavior is cooperative.         Cognition and Memory: Cognition is impaired. Memory is impaired.         Assessment/Plan  Problem List Items Addressed This Visit       Type 2 diabetes mellitus without complication, with long-term current use of insulin (Multi) - Primary     Glucoscan  Metformin  Insulin  Monitor labs           Essential hypertension     Monitor blood pressure  Amlodipine  Lisinopril  Parameters added to hold BP meds if BP < 110mmHg          HLD (hyperlipidemia)     Monitor LFTs, lipids  Statin         Dementia (Multi)     Assist with ADLs and care.  Monitor for safety  Aripirazole  Encourage all abilities   Speech therapy  Has poor judgement- unable to  manage financial concerns           Medications, treatments, and labs reviewed  Continue medications and treatments as listed in EMR    COREY Roche      Electronically Signed By: COREY Roche   8/30/24 10:27 PM

## 2024-08-29 ENCOUNTER — NURSING HOME VISIT (OUTPATIENT)
Dept: POST ACUTE CARE | Facility: EXTERNAL LOCATION | Age: 84
End: 2024-08-29
Payer: COMMERCIAL

## 2024-08-29 DIAGNOSIS — R53.1 WEAKNESS: ICD-10-CM

## 2024-08-29 DIAGNOSIS — I10 ESSENTIAL HYPERTENSION: ICD-10-CM

## 2024-08-29 DIAGNOSIS — Z79.4 TYPE 2 DIABETES MELLITUS WITHOUT COMPLICATION, WITH LONG-TERM CURRENT USE OF INSULIN (MULTI): Primary | ICD-10-CM

## 2024-08-29 DIAGNOSIS — F03.90 DEMENTIA, UNSPECIFIED DEMENTIA SEVERITY, UNSPECIFIED DEMENTIA TYPE, UNSPECIFIED WHETHER BEHAVIORAL, PSYCHOTIC, OR MOOD DISTURBANCE OR ANXIETY (MULTI): ICD-10-CM

## 2024-08-29 DIAGNOSIS — E11.9 TYPE 2 DIABETES MELLITUS WITHOUT COMPLICATION, WITH LONG-TERM CURRENT USE OF INSULIN (MULTI): Primary | ICD-10-CM

## 2024-08-29 PROCEDURE — 99308 SBSQ NF CARE LOW MDM 20: CPT | Performed by: INTERNAL MEDICINE

## 2024-08-29 NOTE — LETTER
Patient: Homero Stone  : 1940    Encounter Date: 2024    PROGRESS NOTE    Subjective  Chief complaint: Homero Stone is a 83 y.o. male who is an acute skilled patient being seen and evaluated for weakness    HPI:  HPI  Patient has been working in therapy due to generalized weakness.  Patient is ambulating increasing distances and completing transfers with improvements.  Therapy is also working with patient on ADL training for upper and lower body bathing and dressing.  Patient was seen and examined at the bedside appears to be in no acute distress.  Patient denies chest pain, shortness of breath nausea vomiting fever chills.    Objective  Vital signs: 104/60, 97.7, 68, 18, 98% blood sugar 95    Physical Exam  Constitutional:       General: He is not in acute distress.  Eyes:      Extraocular Movements: Extraocular movements intact.   Cardiovascular:      Rate and Rhythm: Normal rate and regular rhythm.   Pulmonary:      Effort: Pulmonary effort is normal.      Breath sounds: Normal breath sounds.   Abdominal:      General: Bowel sounds are normal.      Palpations: Abdomen is soft.   Musculoskeletal:      Cervical back: Neck supple.   Neurological:      Mental Status: He is alert.   Psychiatric:         Mood and Affect: Mood normal.         Behavior: Behavior is cooperative.         Assessment/Plan  Problem List Items Addressed This Visit       Type 2 diabetes mellitus without complication, with long-term current use of insulin (Multi) - Primary     Glucoscan  Metformin  Insulin  Monitor labs           Essential hypertension     Monitor blood pressure  Amlodipine  Lisinopril  BP at goal         Weakness     Actively participating in therapy, continue         Dementia (Multi)     Assist with ADLs and care.  Monitor for safety  Aripirazole  Encourage all abilities   Speech therapy          Medications, treatments, and labs reviewed  Continue medications and treatments as listed in EMR      Scribe  Attestation  I, Rashad Lloyd   attest that this documentation has been prepared under the direction and in the presence of Martín Potts MD    Provider Attestation - Scribe documentation  All medical record entries made by the Scribe were at my direction and personally dictated by me. I have reviewed the chart and agree that the record accurately reflects my personal performance of the history, physical exam, discussion and plan.   Martín Potts MD        Electronically Signed By: Martín Potts MD   8/29/24  5:15 PM

## 2024-08-29 NOTE — PROGRESS NOTES
PROGRESS NOTE    Subjective   Chief complaint: Homero Stone is a 83 y.o. male who is a long term care patient being seen and evaluated for assessment financial competence     HPI: Is sitting up on the side of his bed. Is oriented to his name and immediate environment. Disoriented to day and season. Nursing reports that he requires extensive assistance for HOC.    Appetite is consistent.   Completed assessment for financial competence  HPI      Objective   Vital signs: 118/70-70-18-97.7     Physical Exam  Vitals and nursing note reviewed.   Constitutional:       General: He is not in acute distress.  Eyes:      Extraocular Movements: Extraocular movements intact.   Cardiovascular:      Rate and Rhythm: Normal rate and regular rhythm.   Pulmonary:      Effort: Pulmonary effort is normal.      Breath sounds: Normal breath sounds.      Comments: Lungs clear   Abdominal:      General: Bowel sounds are normal.      Palpations: Abdomen is soft.   Musculoskeletal:      Cervical back: Neck supple.      Right lower leg: No edema.      Left lower leg: No edema.   Neurological:      Mental Status: He is alert.   Psychiatric:         Mood and Affect: Mood normal.         Behavior: Behavior is cooperative.         Cognition and Memory: Cognition is impaired. Memory is impaired.         Assessment/Plan   Problem List Items Addressed This Visit       Type 2 diabetes mellitus without complication, with long-term current use of insulin (Multi) - Primary     Glucoscan  Metformin  Insulin  Monitor labs           Essential hypertension     Monitor blood pressure  Amlodipine  Lisinopril  Parameters added to hold BP meds if BP < 110mmHg          HLD (hyperlipidemia)     Monitor LFTs, lipids  Statin         Dementia (Multi)     Assist with ADLs and care.  Monitor for safety  Aripirazole  Encourage all abilities   Speech therapy  Has poor judgement- unable to manage financial concerns           Medications, treatments, and labs  reviewed  Continue medications and treatments as listed in EMR    Yocasta Diaz, APRN-CNP

## 2024-08-29 NOTE — PROGRESS NOTES
PROGRESS NOTE    Subjective   Chief complaint: Homero Stone is a 83 y.o. male who is an acute skilled patient being seen and evaluated for weakness    HPI:  HPI  Patient has been working in therapy due to generalized weakness.  Patient is ambulating increasing distances and completing transfers with improvements.  Therapy is also working with patient on ADL training for upper and lower body bathing and dressing.  Patient was seen and examined at the bedside appears to be in no acute distress.  Patient denies chest pain, shortness of breath nausea vomiting fever chills.    Objective   Vital signs: 104/60, 97.7, 68, 18, 98% blood sugar 95    Physical Exam  Constitutional:       General: He is not in acute distress.  Eyes:      Extraocular Movements: Extraocular movements intact.   Cardiovascular:      Rate and Rhythm: Normal rate and regular rhythm.   Pulmonary:      Effort: Pulmonary effort is normal.      Breath sounds: Normal breath sounds.   Abdominal:      General: Bowel sounds are normal.      Palpations: Abdomen is soft.   Musculoskeletal:      Cervical back: Neck supple.   Neurological:      Mental Status: He is alert.   Psychiatric:         Mood and Affect: Mood normal.         Behavior: Behavior is cooperative.         Assessment/Plan   Problem List Items Addressed This Visit       Type 2 diabetes mellitus without complication, with long-term current use of insulin (Multi) - Primary     Glucoscan  Metformin  Insulin  Monitor labs           Essential hypertension     Monitor blood pressure  Amlodipine  Lisinopril  BP at goal         Weakness     Actively participating in therapy, continue         Dementia (Multi)     Assist with ADLs and care.  Monitor for safety  Aripirazole  Encourage all abilities   Speech therapy          Medications, treatments, and labs reviewed  Continue medications and treatments as listed in EMR      Scribe Attestation  I, Rashad Lloyd   attest that this  documentation has been prepared under the direction and in the presence of Martín Potts MD    Provider Attestation - Scribe documentation  All medical record entries made by the Scribe were at my direction and personally dictated by me. I have reviewed the chart and agree that the record accurately reflects my personal performance of the history, physical exam, discussion and plan.   Martín Potts MD

## 2024-08-31 NOTE — ASSESSMENT & PLAN NOTE
Assist with ADLs and care.  Monitor for safety  Aripirazole  Encourage all abilities   Speech therapy  Has poor judgement- unable to manage financial concerns

## 2024-09-01 ENCOUNTER — NURSING HOME VISIT (OUTPATIENT)
Dept: POST ACUTE CARE | Facility: EXTERNAL LOCATION | Age: 84
End: 2024-09-01
Payer: COMMERCIAL

## 2024-09-01 DIAGNOSIS — E11.9 TYPE 2 DIABETES MELLITUS WITHOUT COMPLICATION, WITH LONG-TERM CURRENT USE OF INSULIN (MULTI): ICD-10-CM

## 2024-09-01 DIAGNOSIS — F03.90 DEMENTIA, UNSPECIFIED DEMENTIA SEVERITY, UNSPECIFIED DEMENTIA TYPE, UNSPECIFIED WHETHER BEHAVIORAL, PSYCHOTIC, OR MOOD DISTURBANCE OR ANXIETY (MULTI): Primary | ICD-10-CM

## 2024-09-01 DIAGNOSIS — R53.1 WEAKNESS: ICD-10-CM

## 2024-09-01 DIAGNOSIS — Z79.4 TYPE 2 DIABETES MELLITUS WITHOUT COMPLICATION, WITH LONG-TERM CURRENT USE OF INSULIN (MULTI): ICD-10-CM

## 2024-09-01 DIAGNOSIS — I10 ESSENTIAL HYPERTENSION: ICD-10-CM

## 2024-09-01 PROCEDURE — 99309 SBSQ NF CARE MODERATE MDM 30: CPT | Performed by: INTERNAL MEDICINE

## 2024-09-01 NOTE — LETTER
Patient: Homero Stone  : 1940    Encounter Date: 2024    PROGRESS NOTE    Subjective  Chief complaint: Homero Stone is a 83 y.o. male who is an acute skilled patient being seen and evaluated for weakness    HPI:  HPI  Patient does continue to make improvements with therapy.  Patient is able to perform safe transfers from various surfaces with moderate independence.  Speech therapy working patient to address cognition.  Therapy also working with patient on completing ADL tasks.  Patient is able to complete with supervision only.  Patient was seen and examined at the bedside, appears to be in no acute distress.  Patient denies chest pain, shortness of breath.  Mentation at baseline.    Objective  Vital signs: 100/60, 97.1, 68, 18, 95%, blood sugar 80    Physical Exam  Constitutional:       General: He is not in acute distress.  Eyes:      Extraocular Movements: Extraocular movements intact.   Cardiovascular:      Rate and Rhythm: Normal rate and regular rhythm.   Pulmonary:      Effort: Pulmonary effort is normal.      Breath sounds: Normal breath sounds.   Abdominal:      General: Bowel sounds are normal.      Palpations: Abdomen is soft.   Musculoskeletal:      Cervical back: Neck supple.   Neurological:      Mental Status: He is alert.   Psychiatric:         Mood and Affect: Mood normal.         Behavior: Behavior is cooperative.         Assessment/Plan  Problem List Items Addressed This Visit       Type 2 diabetes mellitus without complication, with long-term current use of insulin (Multi)     Glucoscan  Metformin  Insulin  Monitor labs           Essential hypertension     Monitor blood pressure  Amlodipine  Lisinopril  BP at goal         Weakness     Improving with therapy, continue         Dementia (Multi) - Primary     Assist with ADLs and care.  Monitor for safety  Aripirazole  Encourage all abilities   Speech therapy          Medications, treatments, and labs reviewed  Continue  medications and treatments as listed in EMR      Scribe Attestation  I, Rashad Lloyd   attest that this documentation has been prepared under the direction and in the presence of Martín Potts MD    Provider Attestation - Scribe documentation  All medical record entries made by the Scribe were at my direction and personally dictated by me. I have reviewed the chart and agree that the record accurately reflects my personal performance of the history, physical exam, discussion and plan.   Martín Potts MD        Electronically Signed By: Martín Potts MD   9/5/24  1:03 PM

## 2024-09-03 NOTE — PROGRESS NOTES
PROGRESS NOTE    Subjective   Chief complaint: Homero Stone is a 83 y.o. male who is an acute skilled patient being seen and evaluated for weakness    HPI:  HPI  Patient does continue to make improvements with therapy.  Patient is able to perform safe transfers from various surfaces with moderate independence.  Speech therapy working patient to address cognition.  Therapy also working with patient on completing ADL tasks.  Patient is able to complete with supervision only.  Patient was seen and examined at the bedside, appears to be in no acute distress.  Patient denies chest pain, shortness of breath.  Mentation at baseline.    Objective   Vital signs: 100/60, 97.1, 68, 18, 95%, blood sugar 80    Physical Exam  Constitutional:       General: He is not in acute distress.  Eyes:      Extraocular Movements: Extraocular movements intact.   Cardiovascular:      Rate and Rhythm: Normal rate and regular rhythm.   Pulmonary:      Effort: Pulmonary effort is normal.      Breath sounds: Normal breath sounds.   Abdominal:      General: Bowel sounds are normal.      Palpations: Abdomen is soft.   Musculoskeletal:      Cervical back: Neck supple.   Neurological:      Mental Status: He is alert.   Psychiatric:         Mood and Affect: Mood normal.         Behavior: Behavior is cooperative.         Assessment/Plan   Problem List Items Addressed This Visit       Type 2 diabetes mellitus without complication, with long-term current use of insulin (Multi)     Glucoscan  Metformin  Insulin  Monitor labs           Essential hypertension     Monitor blood pressure  Amlodipine  Lisinopril  BP at goal         Weakness     Improving with therapy, continue         Dementia (Multi) - Primary     Assist with ADLs and care.  Monitor for safety  Aripirazole  Encourage all abilities   Speech therapy          Medications, treatments, and labs reviewed  Continue medications and treatments as listed in EMR      Scribe Attestation  I,  Lyla Lloydibe   attest that this documentation has been prepared under the direction and in the presence of Martín Potts MD    Provider Attestation - Scribe documentation  All medical record entries made by the Scribe were at my direction and personally dictated by me. I have reviewed the chart and agree that the record accurately reflects my personal performance of the history, physical exam, discussion and plan.   Martín Potts MD

## 2024-09-05 ENCOUNTER — NURSING HOME VISIT (OUTPATIENT)
Dept: POST ACUTE CARE | Facility: EXTERNAL LOCATION | Age: 84
End: 2024-09-05
Payer: COMMERCIAL

## 2024-09-05 DIAGNOSIS — I10 ESSENTIAL HYPERTENSION: ICD-10-CM

## 2024-09-05 DIAGNOSIS — N40.1 BENIGN LOCALIZED HYPERPLASIA OF PROSTATE WITH URINARY OBSTRUCTION AND LOWER URINARY TRACT SYMPTOMS: ICD-10-CM

## 2024-09-05 DIAGNOSIS — E11.9 TYPE 2 DIABETES MELLITUS WITHOUT COMPLICATION, WITH LONG-TERM CURRENT USE OF INSULIN (MULTI): Primary | ICD-10-CM

## 2024-09-05 DIAGNOSIS — N13.9 BENIGN LOCALIZED HYPERPLASIA OF PROSTATE WITH URINARY OBSTRUCTION AND LOWER URINARY TRACT SYMPTOMS: ICD-10-CM

## 2024-09-05 DIAGNOSIS — R53.1 WEAKNESS: ICD-10-CM

## 2024-09-05 DIAGNOSIS — Z79.4 TYPE 2 DIABETES MELLITUS WITHOUT COMPLICATION, WITH LONG-TERM CURRENT USE OF INSULIN (MULTI): Primary | ICD-10-CM

## 2024-09-05 DIAGNOSIS — F03.90 DEMENTIA, UNSPECIFIED DEMENTIA SEVERITY, UNSPECIFIED DEMENTIA TYPE, UNSPECIFIED WHETHER BEHAVIORAL, PSYCHOTIC, OR MOOD DISTURBANCE OR ANXIETY (MULTI): ICD-10-CM

## 2024-09-05 DIAGNOSIS — E78.5 HYPERLIPIDEMIA, UNSPECIFIED HYPERLIPIDEMIA TYPE: ICD-10-CM

## 2024-09-05 PROCEDURE — 99309 SBSQ NF CARE MODERATE MDM 30: CPT | Performed by: INTERNAL MEDICINE

## 2024-09-05 NOTE — PROGRESS NOTES
PROGRESS NOTE    Subjective   Chief complaint: Homero Stone is a 83 y.o. male who is an acute skilled patient being seen and evaluated for weakness and monthly general medical care and follow-up    HPI:  HPI  Patient presents for general medical care and f/u.  Patient seen and examined at bedside.  No issues per nursing.  Patient has no acute complaints.  Patient is currently working with therapy, working towards established goals and plan of care.  DM, denies polydipsia polyuria polyphagia.  Patient has dementia and requires assist with ADLs.  BPH stable, denies difficulty voiding, urinary frequency, and weak stream.  HTN BP at goal.  Denies chest pain and headache.  HLD denies muscle aches.  Mentation at baseline, no acute distress    Objective   Vital signs: 90/60, 97.1, 90, 18, 95%, blood sugar 165    Physical Exam  Constitutional:       General: He is not in acute distress.  Eyes:      Extraocular Movements: Extraocular movements intact.   Cardiovascular:      Rate and Rhythm: Normal rate and regular rhythm.   Pulmonary:      Effort: Pulmonary effort is normal.      Breath sounds: Normal breath sounds.   Abdominal:      General: Bowel sounds are normal.      Palpations: Abdomen is soft.   Musculoskeletal:      Cervical back: Neck supple.   Neurological:      Mental Status: He is alert.   Psychiatric:         Mood and Affect: Mood normal.         Behavior: Behavior is cooperative.         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  Tamsulosin         Type 2 diabetes mellitus without complication, with long-term current use of insulin (Multi) - Primary     Glucoscan  Metformin  Insulin  Monitor labs           Essential hypertension     Monitor blood pressure  Amlodipine  Lisinopril  BP at goal         HLD (hyperlipidemia)     Monitor LFTs, lipids  Statin          Weakness     Continue progressing towards goals         Dementia (Multi)     Assist with ADLs and care.  Monitor for safety  Aripirazole  Encourage all abilities   Speech therapy          Medications, treatments, and labs reviewed  Continue medications and treatments as listed in EMR      Scribe Attestation  Comfort CHATTERJEE Scribe   attest that this documentation has been prepared under the direction and in the presence of Martín Potts MD    Provider Attestation - Scribe documentation  All medical record entries made by the Scribe were at my direction and personally dictated by me. I have reviewed the chart and agree that the record accurately reflects my personal performance of the history, physical exam, discussion and plan.   Martín Potts MD

## 2024-09-05 NOTE — ASSESSMENT & PLAN NOTE
Glucoscan  Metformin  Insulin  Monitor labs     Met with patient at office visit with Dr. Patel.  Her plan will be bilateral mastectomy with axillary node dissection with reconstruction.  She will meet with plastic surgery then plan date for surgery.  Referral placed to oncology rehab for pre surgery PT evaluation and teaching.  Pink bag with surgery booklet given.  Encouraged to call for questions or concerns.

## 2024-09-05 NOTE — LETTER
Patient: Homero Stone  : 1940    Encounter Date: 2024    PROGRESS NOTE    Subjective  Chief complaint: Homero Stone is a 83 y.o. male who is an acute skilled patient being seen and evaluated for weakness and monthly general medical care and follow-up    HPI:  HPI  Patient presents for general medical care and f/u.  Patient seen and examined at bedside.  No issues per nursing.  Patient has no acute complaints.  Patient is currently working with therapy, working towards established goals and plan of care.  DM, denies polydipsia polyuria polyphagia.  Patient has dementia and requires assist with ADLs.  BPH stable, denies difficulty voiding, urinary frequency, and weak stream.  HTN BP at goal.  Denies chest pain and headache.  HLD denies muscle aches.  Mentation at baseline, no acute distress    Objective  Vital signs: 90/60, 97.1, 90, 18, 95%, blood sugar 165    Physical Exam  Constitutional:       General: He is not in acute distress.  Eyes:      Extraocular Movements: Extraocular movements intact.   Cardiovascular:      Rate and Rhythm: Normal rate and regular rhythm.   Pulmonary:      Effort: Pulmonary effort is normal.      Breath sounds: Normal breath sounds.   Abdominal:      General: Bowel sounds are normal.      Palpations: Abdomen is soft.   Musculoskeletal:      Cervical back: Neck supple.   Neurological:      Mental Status: He is alert.   Psychiatric:         Mood and Affect: Mood normal.         Behavior: Behavior is cooperative.         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  Tamsulosin         Type 2 diabetes mellitus without complication, with long-term current use of insulin (Multi) - Primary     Glucoscan  Metformin  Insulin  Monitor labs           Essential hypertension     Monitor blood pressure  Amlodipine  Lisinopril  BP at  goal         HLD (hyperlipidemia)     Monitor LFTs, lipids  Statin         Weakness     Continue progressing towards goals         Dementia (Multi)     Assist with ADLs and care.  Monitor for safety  Aripirazole  Encourage all abilities   Speech therapy          Medications, treatments, and labs reviewed  Continue medications and treatments as listed in EMR      Scribe Attestation  Comfort CHATTERJEE Scribe   attest that this documentation has been prepared under the direction and in the presence of Martín Potts MD    Provider Attestation - Scribe documentation  All medical record entries made by the Scribe were at my direction and personally dictated by me. I have reviewed the chart and agree that the record accurately reflects my personal performance of the history, physical exam, discussion and plan.   Martín Potts MD        Electronically Signed By: Martín Potts MD   9/6/24  8:17 AM

## 2024-09-09 ENCOUNTER — HOSPITAL ENCOUNTER (EMERGENCY)
Facility: HOSPITAL | Age: 84
Discharge: HOME | End: 2024-09-10
Attending: STUDENT IN AN ORGANIZED HEALTH CARE EDUCATION/TRAINING PROGRAM
Payer: COMMERCIAL

## 2024-09-09 ENCOUNTER — NURSING HOME VISIT (OUTPATIENT)
Dept: POST ACUTE CARE | Facility: EXTERNAL LOCATION | Age: 84
End: 2024-09-09

## 2024-09-09 ENCOUNTER — APPOINTMENT (OUTPATIENT)
Dept: CARDIOLOGY | Facility: HOSPITAL | Age: 84
End: 2024-09-09
Payer: COMMERCIAL

## 2024-09-09 ENCOUNTER — APPOINTMENT (OUTPATIENT)
Dept: RADIOLOGY | Facility: HOSPITAL | Age: 84
End: 2024-09-09
Payer: COMMERCIAL

## 2024-09-09 DIAGNOSIS — S09.90XA INJURY OF HEAD, INITIAL ENCOUNTER: ICD-10-CM
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
## 2024-09-09 DIAGNOSIS — N39.0 COMPLICATED UTI (URINARY TRACT INFECTION): ICD-10-CM

## 2024-09-09 DIAGNOSIS — Z79.4 TYPE 2 DIABETES MELLITUS WITHOUT COMPLICATION, WITH LONG-TERM CURRENT USE OF INSULIN (MULTI): ICD-10-CM

## 2024-09-09 DIAGNOSIS — S09.90XA CLOSED HEAD INJURY, INITIAL ENCOUNTER: ICD-10-CM

## 2024-09-09 DIAGNOSIS — I10 ESSENTIAL HYPERTENSION: ICD-10-CM
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
## 2024-09-09 DIAGNOSIS — E11.9 TYPE 2 DIABETES MELLITUS WITHOUT COMPLICATION, WITH LONG-TERM CURRENT USE OF INSULIN (MULTI): ICD-10-CM

## 2024-09-09 DIAGNOSIS — T14.90XA TRAUMA: ICD-10-CM

## 2024-09-09 LAB
ALBUMIN SERPL BCP-MCNC: 3.5 G/DL (ref 3.4–5)
ALP SERPL-CCNC: 85 U/L (ref 33–136)
ALT SERPL W P-5'-P-CCNC: 13 U/L (ref 10–52)
ANION GAP SERPL CALC-SCNC: 10 MMOL/L (ref 10–20)
APPEARANCE UR: ABNORMAL
AST SERPL W P-5'-P-CCNC: 17 U/L (ref 9–39)
BASOPHILS # BLD AUTO: 0.03 X10*3/UL (ref 0–0.1)
BASOPHILS NFR BLD AUTO: 0.3 %
BILIRUB SERPL-MCNC: 0.5 MG/DL (ref 0–1.2)
BILIRUB UR STRIP.AUTO-MCNC: NEGATIVE MG/DL
BUN SERPL-MCNC: 15 MG/DL (ref 6–23)
CALCIUM SERPL-MCNC: 8.3 MG/DL (ref 8.6–10.3)
CARDIAC TROPONIN I PNL SERPL HS: 5 NG/L (ref 0–20)
CARDIAC TROPONIN I PNL SERPL HS: 6 NG/L (ref 0–20)
CHLORIDE SERPL-SCNC: 106 MMOL/L (ref 98–107)
CO2 SERPL-SCNC: 27 MMOL/L (ref 21–32)
COLOR UR: ABNORMAL
CREAT SERPL-MCNC: 0.89 MG/DL (ref 0.5–1.3)
EGFRCR SERPLBLD CKD-EPI 2021: 86 ML/MIN/1.73M*2
EOSINOPHIL # BLD AUTO: 0.09 X10*3/UL (ref 0–0.4)
EOSINOPHIL NFR BLD AUTO: 0.8 %
ERYTHROCYTE [DISTWIDTH] IN BLOOD BY AUTOMATED COUNT: 14.2 % (ref 11.5–14.5)
GLUCOSE BLD MANUAL STRIP-MCNC: 134 MG/DL (ref 74–99)
GLUCOSE SERPL-MCNC: 118 MG/DL (ref 74–99)
GLUCOSE UR STRIP.AUTO-MCNC: NORMAL MG/DL
HCT VFR BLD AUTO: 37.4 % (ref 41–52)
HGB BLD-MCNC: 13.2 G/DL (ref 13.5–17.5)
IMM GRANULOCYTES # BLD AUTO: 0.06 X10*3/UL (ref 0–0.5)
IMM GRANULOCYTES NFR BLD AUTO: 0.5 % (ref 0–0.9)
KETONES UR STRIP.AUTO-MCNC: ABNORMAL MG/DL
LEUKOCYTE ESTERASE UR QL STRIP.AUTO: ABNORMAL
LYMPHOCYTES # BLD AUTO: 2.28 X10*3/UL (ref 0.8–3)
LYMPHOCYTES NFR BLD AUTO: 20.4 %
MCH RBC QN AUTO: 27.4 PG (ref 26–34)
MCHC RBC AUTO-ENTMCNC: 35.3 G/DL (ref 32–36)
MCV RBC AUTO: 78 FL (ref 80–100)
MONOCYTES # BLD AUTO: 0.93 X10*3/UL (ref 0.05–0.8)
MONOCYTES NFR BLD AUTO: 8.3 %
MUCOUS THREADS #/AREA URNS AUTO: ABNORMAL /LPF
NEUTROPHILS # BLD AUTO: 7.81 X10*3/UL (ref 1.6–5.5)
NEUTROPHILS NFR BLD AUTO: 69.7 %
NITRITE UR QL STRIP.AUTO: NEGATIVE
NRBC BLD-RTO: 0 /100 WBCS (ref 0–0)
PH UR STRIP.AUTO: 5.5 [PH]
PLATELET # BLD AUTO: 344 X10*3/UL (ref 150–450)
POTASSIUM SERPL-SCNC: 4.3 MMOL/L (ref 3.5–5.3)
PROT SERPL-MCNC: 5.8 G/DL (ref 6.4–8.2)
PROT UR STRIP.AUTO-MCNC: ABNORMAL MG/DL
RBC # BLD AUTO: 4.81 X10*6/UL (ref 4.5–5.9)
RBC # UR STRIP.AUTO: ABNORMAL /UL
RBC #/AREA URNS AUTO: >20 /HPF
SODIUM SERPL-SCNC: 139 MMOL/L (ref 136–145)
SP GR UR STRIP.AUTO: 1.02
SQUAMOUS #/AREA URNS AUTO: ABNORMAL /HPF
TSH SERPL-ACNC: 3.3 MIU/L (ref 0.44–3.98)
UROBILINOGEN UR STRIP.AUTO-MCNC: NORMAL MG/DL
WBC # BLD AUTO: 11.2 X10*3/UL (ref 4.4–11.3)
WBC #/AREA URNS AUTO: ABNORMAL /HPF

## 2024-09-09 PROCEDURE — 85025 COMPLETE CBC W/AUTO DIFF WBC: CPT | Performed by: STUDENT IN AN ORGANIZED HEALTH CARE EDUCATION/TRAINING PROGRAM

## 2024-09-09 PROCEDURE — 81001 URINALYSIS AUTO W/SCOPE: CPT | Performed by: STUDENT IN AN ORGANIZED HEALTH CARE EDUCATION/TRAINING PROGRAM

## 2024-09-09 PROCEDURE — 76377 3D RENDER W/INTRP POSTPROCES: CPT

## 2024-09-09 PROCEDURE — 96360 HYDRATION IV INFUSION INIT: CPT

## 2024-09-09 PROCEDURE — 2500000004 HC RX 250 GENERAL PHARMACY W/ HCPCS (ALT 636 FOR OP/ED): Performed by: STUDENT IN AN ORGANIZED HEALTH CARE EDUCATION/TRAINING PROGRAM

## 2024-09-09 PROCEDURE — 84443 ASSAY THYROID STIM HORMONE: CPT | Performed by: STUDENT IN AN ORGANIZED HEALTH CARE EDUCATION/TRAINING PROGRAM

## 2024-09-09 PROCEDURE — 80053 COMPREHEN METABOLIC PANEL: CPT | Performed by: STUDENT IN AN ORGANIZED HEALTH CARE EDUCATION/TRAINING PROGRAM

## 2024-09-09 PROCEDURE — 70486 CT MAXILLOFACIAL W/O DYE: CPT

## 2024-09-09 PROCEDURE — 99285 EMERGENCY DEPT VISIT HI MDM: CPT | Mod: 25

## 2024-09-09 PROCEDURE — 93005 ELECTROCARDIOGRAM TRACING: CPT

## 2024-09-09 PROCEDURE — 84484 ASSAY OF TROPONIN QUANT: CPT | Performed by: STUDENT IN AN ORGANIZED HEALTH CARE EDUCATION/TRAINING PROGRAM

## 2024-09-09 PROCEDURE — 36415 COLL VENOUS BLD VENIPUNCTURE: CPT | Performed by: STUDENT IN AN ORGANIZED HEALTH CARE EDUCATION/TRAINING PROGRAM

## 2024-09-09 PROCEDURE — 76376 3D RENDER W/INTRP POSTPROCES: CPT | Performed by: RADIOLOGY

## 2024-09-09 PROCEDURE — 99310 SBSQ NF CARE HIGH MDM 45: CPT | Performed by: INTERNAL MEDICINE

## 2024-09-09 PROCEDURE — 2500000002 HC RX 250 W HCPCS SELF ADMINISTERED DRUGS (ALT 637 FOR MEDICARE OP, ALT 636 FOR OP/ED): Performed by: STUDENT IN AN ORGANIZED HEALTH CARE EDUCATION/TRAINING PROGRAM

## 2024-09-09 PROCEDURE — 70450 CT HEAD/BRAIN W/O DYE: CPT | Performed by: RADIOLOGY

## 2024-09-09 PROCEDURE — 70450 CT HEAD/BRAIN W/O DYE: CPT

## 2024-09-09 PROCEDURE — 96361 HYDRATE IV INFUSION ADD-ON: CPT

## 2024-09-09 PROCEDURE — 87086 URINE CULTURE/COLONY COUNT: CPT | Mod: AHULAB | Performed by: STUDENT IN AN ORGANIZED HEALTH CARE EDUCATION/TRAINING PROGRAM

## 2024-09-09 PROCEDURE — 70486 CT MAXILLOFACIAL W/O DYE: CPT | Performed by: RADIOLOGY

## 2024-09-09 PROCEDURE — 82947 ASSAY GLUCOSE BLOOD QUANT: CPT

## 2024-09-09 RX ORDER — ACETAMINOPHEN 325 MG/1
650 TABLET ORAL EVERY 6 HOURS PRN
COMMUNITY

## 2024-09-09 RX ORDER — SULFAMETHOXAZOLE AND TRIMETHOPRIM 800; 160 MG/1; MG/1
1 TABLET ORAL ONCE
Status: COMPLETED | OUTPATIENT
Start: 2024-09-09 | End: 2024-09-09

## 2024-09-09 RX ORDER — ACETAMINOPHEN 500 MG
1000 TABLET ORAL EVERY 6 HOURS PRN
Qty: 30 TABLET | Refills: 0 | Status: SHIPPED | OUTPATIENT
Start: 2024-09-09 | End: 2024-09-19

## 2024-09-09 RX ORDER — LIDOCAINE HYDROCHLORIDE 20 MG/ML
1 JELLY TOPICAL ONCE
Status: DISCONTINUED | OUTPATIENT
Start: 2024-09-09 | End: 2024-09-10 | Stop reason: HOSPADM

## 2024-09-09 RX ORDER — CYANOCOBALAMIN 1000 UG/ML
1000 INJECTION, SOLUTION INTRAMUSCULAR; SUBCUTANEOUS
COMMUNITY

## 2024-09-09 RX ORDER — DONEPEZIL HYDROCHLORIDE 5 MG/1
5 TABLET, FILM COATED ORAL NIGHTLY
COMMUNITY

## 2024-09-09 RX ORDER — SULFAMETHOXAZOLE AND TRIMETHOPRIM 800; 160 MG/1; MG/1
1 TABLET ORAL 2 TIMES DAILY
Qty: 14 TABLET | Refills: 0 | Status: SHIPPED | OUTPATIENT
Start: 2024-09-09 | End: 2024-09-16

## 2024-09-09 RX ORDER — BISACODYL 10 MG/1
10 SUPPOSITORY RECTAL ONCE AS NEEDED
COMMUNITY

## 2024-09-09 RX ORDER — DEXTROMETHORPHAN POLISTIREX 30 MG/5 ML
1 SUSPENSION, EXTENDED RELEASE 12 HR ORAL AS NEEDED
COMMUNITY

## 2024-09-09 RX ORDER — CHOLECALCIFEROL (VITAMIN D3) 50 MCG
50 TABLET ORAL DAILY
COMMUNITY

## 2024-09-09 RX ORDER — ADHESIVE BANDAGE
30 BANDAGE TOPICAL DAILY PRN
COMMUNITY

## 2024-09-09 ASSESSMENT — COLUMBIA-SUICIDE SEVERITY RATING SCALE - C-SSRS
1. IN THE PAST MONTH, HAVE YOU WISHED YOU WERE DEAD OR WISHED YOU COULD GO TO SLEEP AND NOT WAKE UP?: NO
2. HAVE YOU ACTUALLY HAD ANY THOUGHTS OF KILLING YOURSELF?: NO
6. HAVE YOU EVER DONE ANYTHING, STARTED TO DO ANYTHING, OR PREPARED TO DO ANYTHING TO END YOUR LIFE?: NO

## 2024-09-09 ASSESSMENT — PAIN - FUNCTIONAL ASSESSMENT: PAIN_FUNCTIONAL_ASSESSMENT: 0-10

## 2024-09-09 ASSESSMENT — PAIN SCALES - GENERAL: PAINLEVEL_OUTOF10: 0 - NO PAIN

## 2024-09-09 NOTE — PROGRESS NOTES
Pharmacy Medication History Review    Homero Stone is a 83 y.o. male admitted for No Principal Problem: There is no principal problem currently on the Problem List. Please update the Problem List and refresh.. Pharmacy reviewed the patient's [id number] medications and allergies for accuracy.    The list below reflectives the updated PTA list. Please review each medication in order reconciliation for additional clarification and justification.  Prior to Admission Medications   Prescriptions Last Dose Informant               acetaminophen (Tylenol) 325 mg tablet       Sig: Take 2 tablets (650 mg) by mouth every 6 hours if needed for mild pain (1 - 3).   amLODIPine (Norvasc) 5 mg tablet  Other     Sig: Take 1 tablet (5 mg) by mouth once daily. HOLD FOR SPB LESS THAN 110   atorvastatin (Lipitor) 40 mg tablet  Other     Sig: Take 1 tablet (40 mg) by mouth once daily at bedtime.   bisacodyl (Dulcolax) 10 mg suppository       Sig: Insert 1 suppository (10 mg) into the rectum 1 time if needed for constipation (AT BEDTIME IF NO BM 8 HOURS AFTER MOM ADMINISTRATION).   cholecalciferol (Vitamin D-3) 50 MCG (2000 UT) tablet       Sig: Take 1 tablet (50 mcg) by mouth once daily.   cyanocobalamin (Vitamin B-12) 1,000 mcg/mL injection       Sig: Inject 1 mL (1,000 mcg) into the muscle every 30 (thirty) days. ON TUESDAY   donepezil (Aricept) 5 mg tablet       Sig: Take 1 tablet (5 mg) by mouth once daily at bedtime. FOR DEMENTIA   insulin glargine (Lantus) 100 unit/mL injection       Sig: Inject 10 Units under the skin once daily at bedtime. Take as directed per insulin instructions.   lisinopril 10 mg tablet  Other     Sig: Take 1 tablet (10 mg) by mouth once daily.   magnesium hydroxide (Milk of Magnesia) 400 mg/5 mL suspension       Sig: Take 30 mL by mouth once daily as needed for constipation (IF NO BM IN 3 CONSECUTIVE DAYS).   metFORMIN (Glucophage) 1,000 mg tablet  Other     Sig: Take 1 tablet (1,000 mg) by  mouth 2 times daily (morning and late afternoon).   mineral oil enema       Sig: Insert 133 mL (1 enema) into the rectum if needed for constipation (IF NO BM WITHIN 1 HOUR AFTER RECIEVING SUPPOSITORY, IF NO BM 1 HOUR AFTER RECIEVING ENEMA CALL MD).   tamsulosin (Flomax) 0.4 mg 24 hr capsule  Other     Sig: Take 1 capsule (0.4 mg) by mouth once daily at bedtime.      Facility-Administered Medications: None      Allergies  Reviewed by Amelia Duque on 9/9/2024        Severity Reactions Comments    Penicillins High Hallucinations, Shortness of breath, Swelling, Unknown     Lidocaine Not Specified Nausea/vomiting, Unknown hallucinations    Naproxen Not Specified Dizziness     Nsaids (non-steroidal Anti-inflammatory Drug) Not Specified Dizziness     Quinolones Not Specified Other             Below are additional concerns with the patient's PTA list.  The following updates were made to the Prior to Admission medication list:     Source of Information:     Medications ADDED:   CHOLECALCIFEROL 50MCG  MILK OF MAGNESIA SUSP  MINERAL OIL ENEMA  ACETAMINOPHEN 325MG  DONEPEZIL 5MG  CYANOCOBALAMIN INJ 1000MCG  BISACODYL 10MG SUPP  LISINOPRIL 10MG  Medications CHANGED:  N/A  Medications REMOVED:   N/A  Medications NOT TAKING:   LISINOPRIL 40MG  ARIPIPRAZOLE 2MG    Allergy reviewed : Yes    Comments: Medication list sent from facility.     Amelia Duque

## 2024-09-09 NOTE — ED PROVIDER NOTES
HPI   Chief Complaint   Patient presents with    Fall       83-year-old male presents with reported fall prior to arrival.  Patient states he became lightheaded and leaned against a wall, then per EMS patient fell and struck the right side of his head.  Unknown if LOC.  He is not on anticoagulation.  He does have history of BPPV, insulin-dependent T2DM, dementia, pituitary adenoma.  Patient states he recalls becoming lightheaded and laid against the wall but does not recall the fall itself.  He states he had been eating and drinking normally.  Denies missing any doses of medications or any new medications added or dosage adjustments.  He denies any current headache, lightheadedness, dizziness, vision changes, chest pain, shortness of breath, abdominal pain, nausea, vomiting, diarrhea, dysuria, numbness, tingling, weakness.        Patient History   Past Medical History:   Diagnosis Date    BPPV (benign paroxysmal positional vertigo)     Diabetes mellitus (Multi)     HLD (hyperlipidemia)     Hypertension     Pituitary adenoma (Multi)     Prostate cancer (Multi)      Past Surgical History:   Procedure Laterality Date    CT ANGIO NECK  3/2/2017    CT NECK ANGIO W AND WO IV CONTRAST 3/2/2017 [facility]    CT HEAD ANGIO W AND WO IV CONTRAST  3/2/2017    CT HEAD ANGIO W AND WO IV CONTRAST 3/2/2017 [facility] EM[facility]GENCY [facility]     Family History   Problem Relation Name Age of Onset    No Known Problems Mother      No Known Problems Father       Social History     Tobacco Use    Smoking status: Former     Types: Cigarettes    Smokeless tobacco: Never   Substance Use Topics    Alcohol use: Not Currently    Drug use: Never       Physical Exam   ED Triage Vitals [09/09/24 0949]   Temperature Heart Rate Respirations BP   36.8 °C (98.2 °F) 78 16 114/68      Pulse Ox Temp src Heart Rate Source Patient Position   95 % -- -- --      BP Location FiO2 (%)     -- --       Physical Exam  Vitals reviewed.   HENT:      Head:       Comments: Shallow skin tear to right eyelid, no active bleeding, not through and through.  No raccoon eyes, no Vaughan sign.     Ears:      Comments: No hemotympanum     Mouth/Throat:      Pharynx: Oropharynx is clear.   Eyes:      Extraocular Movements: Extraocular movements intact.      Pupils: Pupils are equal, round, and reactive to light.   Cardiovascular:      Rate and Rhythm: Normal rate and regular rhythm.   Pulmonary:      Effort: Pulmonary effort is normal.      Breath sounds: Normal breath sounds. No stridor. No wheezing, rhonchi or rales.   Abdominal:      Palpations: Abdomen is soft.      Tenderness: There is no abdominal tenderness. There is no guarding or rebound.   Musculoskeletal:         General: Normal range of motion.      Cervical back: Normal range of motion and neck supple. No tenderness (No midline CT LS tenderness, no step-offs or deformities).   Neurological:      Mental Status: He is alert.      Cranial Nerves: No cranial nerve deficit.      Sensory: No sensory deficit.      Motor: No weakness.      Comments: Oriented to self, place, situation but not time           ED Course & [facility]   ED Course as of 09/12/24 0204   Mon Sep 09, 2024   0945 EKG, interpreted by me: Sinus rhythm with single noted PAC.  No acute ST elevation or depression.  No acute T wave abnormalities.  QTc 456. [JG]   1243 SS for JG    From [facility] felt lightheaded and fell. Skin tear R eyelid doesn't need repaired. CT neg. Needs urine then likely DC [SS]   1952 Microscopic Only, Urine(!)  Suggestive of UTI [SS]      ED Course User Index  [JG] Janiya Wood MD  [SS] Steffen Simerlink, MD         Diagnoses as of 09/12/24 0204   Fall, initial encounter   Complicated UTI (urinary tract infection)   Closed head injury, initial encounter                 No data recorded     [place] Coma Scale Score: 15 (09/09/24 0948 : Luiasna Thomas, JOE)                           Medical Decision Making  Patient A&O4 3 on evaluation, not  oriented to time but otherwise answering questions appropriately.  NIH 0.  Patient does have a small skin tear to right eyelid, not through and through, not actively bleeding, does not require repair at this time.  Full EOM with normal visual acuity on examination.  No midline CT LS tenderness.  Patient not on anticoagulation per EMR.  No other identifiable traumatic injury seen on initial examination.  Will obtain CT head given patient's history of pituitary adenoma and recent fall to rule out intracranial hemorrhage versus mass and CT maxillofacial to rule out orbital fracture, lab workup to rule out electrolyte abnormality versus leukocytosis.  EMS reports patient was initially hypotensive on their arrival at 80/50, he was given 250 mL IVF prior to arrival with improvement in BP, 114/68 on arrival to the ED. CVA is less likely as patient is currently at neurologic baseline. Other differentials considered include hypotension, vertigo, electrolyte deficiency, UTI, dehydration, ACS, arrhythmia.      Patient signed out to oncoming attending pending remainder of workup and dispo.          Procedure  Procedures     Janiya Wood MD  09/12/24 020

## 2024-09-09 NOTE — ED TRIAGE NOTES
PT TO ED VIA EMS WITH C/O FALL. PER SNF PT WAS IN THE DINING ROOM AND GOT DIZZY FALLING AND HITTING RIGHT SIDE OF FACE ON WALL. PT HAS A LAC ABOVE RIGHT EYE. PT A O X2 AT BASELINE. -THINNERS -LOC

## 2024-09-09 NOTE — DISCHARGE INSTRUCTIONS
Tylenol (acetaminophen) Warning: Some medications you have been given today contain Tylenol (acetaminophen).  Do not take more than 4,000mg of tylenol (acetaminophen) in any 24 hour period. Do not take other medications which contain Tylenol (acetaminophen).  Many common over the counter cold and pain medications include Tylenol (acetaminophen) as an ingredient.  Please be very careful, and if you are unsure ask your doctor or pharmacist.  Take all antibiotics until gone.  Do not share your medication with anyone.    UTI Instructions: Return to the ED if you are unable to take your antibiotics, if you have vomiting, or if you have a fever.    Head Injury Instructions: Return to the ED if you have problems seeing, walking, talking, if you vomit more than once, if you are hard to wake up, have unequal pupils, slurred speech, or a seizure.

## 2024-09-09 NOTE — PROGRESS NOTES
Emergency Department Transition of Care Note       Signout   I received Homero Stone in signout from Dr. Wood.  Please see the ED Provider Note for all HPI, PE and MDM up to the time of signout which is documented in the ED course.  This is in addition to the primary record.    See ED course of sign out comments.    ED Course & Medical Decision Making   ED Course:  ED Course as of 09/09/24 2140   Mon Sep 09, 2024   0945 EKG, interpreted by me: Sinus rhythm with single noted PAC.  No acute ST elevation or depression.  No acute T wave abnormalities.  QTc 456. [JG]   1243 SS for JG    From [facility] felt lightheaded and fell. Skin tear R eyelid doesn't need repaired. CT neg. Needs urine then likely DC [SS]   1952 Microscopic Only, Urine(!)  Suggestive of UTI [SS]      ED Course User Index  [JG] Janiya Wood MD  [SS] Steffen Simerlink, MD         Diagnoses as of 09/09/24 2140   Fall, initial encounter   Complicated UTI (urinary tract infection)   Closed head injury, initial encounter       Medical Decision Making:  Under my care, UA suggestive of UTI. Started on course of bactrim. DC in stable condition.     Disposition   As a result of the work-up, the patient was discharged home.  he was informed of his diagnosis and instructed to come back with any concerns or worsening of condition.  he and was agreeable to the plan as discussed above.  he was given the opportunity to ask questions.  All of the patient's questions were answered.    Procedures   Procedures    Steffen Simerlink, MD

## 2024-09-09 NOTE — LETTER
Patient: Homero Stone  : 1940    Encounter Date: 2024    PROGRESS NOTE    Subjective  Chief complaint: Homero Stone is a 83 y.o. male who is an acute skilled patient being seen and evaluated for weakness    HPI:  HPI  An interactive audio and/or video telecommunication system which permits real time communications between the patient (at the originating site) and provider (at a distant site) was utilized to provide this telehealth service after obtaining verbal consent.  Patient had a fall this morning, hit his head.  Patient was noted to have low BP after fall.  Patient is not on anticoagulant.      Objective  Vital signs: 94/61, 97.6, 83, 18, 96%, blood sugar 150    Physical Exam  Constitutional:       General: He is not in acute distress.  Eyes:      Extraocular Movements: Extraocular movements intact.   Pulmonary:      Effort: Pulmonary effort is normal.   Musculoskeletal:      Cervical back: Neck supple.   Neurological:      Mental Status: He is alert.   Psychiatric:         Mood and Affect: Mood normal.         Behavior: Behavior is cooperative.         Assessment/Plan  Problem List Items Addressed This Visit       Type 2 diabetes mellitus without complication, with long-term current use of insulin (Multi)     Glucoscan  Metformin  Insulin  Monitor labs  FBG at goal         Essential hypertension     BP soft  Monitor blood pressure  Lisinopril           Fall - Primary     Given head injury and change in vital signs, will send patient to ED for further evaluation          Head injury     Patient struck head during a fall today.  Now noted to have low BP.  Will send patient to ED for further eval r/o brain bleed.          Medications, treatments, and labs reviewed  Continue medications and treatments as listed in EMR      Scribe Attestation  I, Rashad Lloyd   attest that this documentation has been prepared under the direction and in the presence of Martín Potts  MD    Provider Attestation - Scribe documentation  All medical record entries made by the Scribe were at my direction and personally dictated by me. I have reviewed the chart and agree that the record accurately reflects my personal performance of the history, physical exam, discussion and plan.   Martín Potts MD        Electronically Signed By: Martín Potts MD   9/17/24  6:35 PM

## 2024-09-10 VITALS
HEART RATE: 71 BPM | DIASTOLIC BLOOD PRESSURE: 68 MMHG | RESPIRATION RATE: 18 BRPM | OXYGEN SATURATION: 97 % | SYSTOLIC BLOOD PRESSURE: 128 MMHG | TEMPERATURE: 98.2 F

## 2024-09-10 LAB — HOLD SPECIMEN: NORMAL

## 2024-09-12 ENCOUNTER — NURSING HOME VISIT (OUTPATIENT)
Dept: POST ACUTE CARE | Facility: EXTERNAL LOCATION | Age: 84
End: 2024-09-12
Payer: COMMERCIAL

## 2024-09-12 DIAGNOSIS — N13.9 BENIGN LOCALIZED HYPERPLASIA OF PROSTATE WITH URINARY OBSTRUCTION AND LOWER URINARY TRACT SYMPTOMS: ICD-10-CM

## 2024-09-12 DIAGNOSIS — E11.9 TYPE 2 DIABETES MELLITUS WITHOUT COMPLICATION, WITH LONG-TERM CURRENT USE OF INSULIN (MULTI): ICD-10-CM

## 2024-09-12 DIAGNOSIS — I10 ESSENTIAL HYPERTENSION: ICD-10-CM

## 2024-09-12 DIAGNOSIS — Z79.4 TYPE 2 DIABETES MELLITUS WITHOUT COMPLICATION, WITH LONG-TERM CURRENT USE OF INSULIN (MULTI): ICD-10-CM

## 2024-09-12 DIAGNOSIS — F03.90 DEMENTIA, UNSPECIFIED DEMENTIA SEVERITY, UNSPECIFIED DEMENTIA TYPE, UNSPECIFIED WHETHER BEHAVIORAL, PSYCHOTIC, OR MOOD DISTURBANCE OR ANXIETY (MULTI): ICD-10-CM

## 2024-09-12 DIAGNOSIS — N39.0 URINARY TRACT INFECTION WITHOUT HEMATURIA, SITE UNSPECIFIED: Primary | ICD-10-CM

## 2024-09-12 DIAGNOSIS — E78.5 HYPERLIPIDEMIA, UNSPECIFIED HYPERLIPIDEMIA TYPE: ICD-10-CM
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
## 2024-09-12 DIAGNOSIS — N40.1 BENIGN LOCALIZED HYPERPLASIA OF PROSTATE WITH URINARY OBSTRUCTION AND LOWER URINARY TRACT SYMPTOMS: ICD-10-CM
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
## 2024-09-12 LAB — BACTERIA UR CULT: ABNORMAL

## 2024-09-12 PROCEDURE — 99305 1ST NF CARE MODERATE MDM 35: CPT | Performed by: INTERNAL MEDICINE

## 2024-09-12 NOTE — PROGRESS NOTES
HISTORY & PHYSICAL    Subjective   Chief complaint: Homero Stone is a 83 y.o. male who is being seen and evaluated for multiple medical problems.  Patient admitted to SNF for therapy due to weakness after recent hospitalization.    HPI:  HPI  An interactive audio and/or video telecommunication system which permits real time communications between the patient (at the originating site) and provider (at a distant site) was utilized to provide this telehealth service after obtaining verbal consent.  Patient is a 83-year-old male presenting for readmission back to nursing facility following hospitalization.  Patient had a fall at nursing facility striking right side of head.  Patient reporting feeling lightheaded and leaned against a wall but does not recall falling.  Patient did sustain a small skin tear to right eyelid.  Patient was noted to be hypotensive and was given IV fluids.  Patient did have a UA obtained noted to be positive.Patient was started on antibiotics and is to be completed outpatient.  Patient was hemodynamically stable to discharge back to nursing facility for continued medical management and further therapy.  Patient appears to be in no acute distress.  Patient is tolerating antibiotics without adverse effects.  Patient is denying constitutional symptoms.  Past Medical History:   Diagnosis Date    BPPV (benign paroxysmal positional vertigo)     Diabetes mellitus (Multi)     HLD (hyperlipidemia)     Hypertension     Pituitary adenoma (Multi)     Prostate cancer (Multi)        Past Surgical History:   Procedure Laterality Date    CT ANGIO NECK  3/2/2017    [place]EC[place] ANGIO W AND WO [place] [facility] [place] [facility]    CT HEAD ANGIO W AND WO IV CONTRAST  3/2/2017    CT HEAD ANGIO W AND WO IV CONTRAST [place] [facility]       Family History   Problem Relation Name Age of Onset    No Known Problems Mother      No Known Problems Father         Social History     Socioeconomic History     Marital status: Single   Tobacco Use    Smoking status: Former     Types: Cigarettes    Smokeless tobacco: Never   Substance and Sexual Activity    Alcohol use: Not Currently    Drug use: Never    Sexual activity: Defer     Social Determinants of Health     Financial Resource Strain: Low Risk  (6/23/2024)    Overall Financial Resource Strain (CARDIA)     Difficulty of Paying Living Expenses: Not hard at all   Transportation Needs: No Transportation Needs (6/23/2024)    PRAPARE - Transportation     Lack of Transportation (Medical): No     Lack of Transportation (Non-Medical): No   Housing Stability: Low Risk  (6/23/2024)    Housing Stability Vital Sign     Unable to Pay for Housing in the Last Year: No     Number of Places Lived in the Last Year: 1     Unstable Housing in the Last Year: No       Vital signs: 94/61, 97.6, 83, 18, 96%     Objective   Physical Exam  Constitutional:       General: He is not in acute distress.  Eyes:      Extraocular Movements: Extraocular movements intact.   Pulmonary:      Effort: Pulmonary effort is normal.   Musculoskeletal:      Cervical back: Neck supple.   Neurological:      Mental Status: He is alert.   Psychiatric:         Mood and Affect: Mood normal.         Behavior: Behavior is cooperative.         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  Tamsulosin         Type 2 diabetes mellitus without complication, with long-term current use of insulin (Multi)     Glucoscan  Metformin  Insulin  Monitor labs           Essential hypertension     Monitor blood pressure  Lisinopril           HLD (hyperlipidemia)     Monitor LFTs, lipids  Statin         Dementia (Multi)     Assist with ADLs and care.  Monitor for safety  Aricept  Encourage all abilities          UTI (urinary tract infection) - Primary     Continue antibiotic until  complete 9/17/2024         Fall     Skin tear to right eyelid  Evaluated in hospital          Hospital records reviewed  Medications, treatments, and labs reviewed  Continue medications and treatments as listed in EMR  Discussed with nursing and therapy      Scribe Attestation  I, Rashad Lloyd   attest that this documentation has been prepared under the direction and in the presence of Martín Potts MD    Provider Attestation - Scribe documentation  All medical record entries made by the Scribe were at my direction and personally dictated by me. I have reviewed the chart and agree that the record accurately reflects my personal performance of the history, physical exam, discussion and plan.   Martín Potts MD

## 2024-09-12 NOTE — LETTER
Patient: Homero Stone  : 1940    Encounter Date: 2024    HISTORY & PHYSICAL    Subjective  Chief complaint: Homero Stone is a 83 y.o. male who is being seen and evaluated for multiple medical problems.  Patient admitted to SNF for therapy due to weakness after recent hospitalization.    HPI:  HPI  An interactive audio and/or video telecommunication system which permits real time communications between the patient (at the originating site) and provider (at a distant site) was utilized to provide this telehealth service after obtaining verbal consent.  Patient is a 83-year-old male presenting for readmission back to nursing facility following hospitalization.  Patient had a fall at nursing facility striking right side of head.  Patient reporting feeling lightheaded and leaned against a wall but does not recall falling.  Patient did sustain a small skin tear to right eyelid.  Patient was noted to be hypotensive and was given IV fluids.  Patient did have a UA obtained noted to be positive.Patient was started on antibiotics and is to be completed outpatient.  Patient was hemodynamically stable to discharge back to nursing facility for continued medical management and further therapy.  Patient appears to be in no acute distress.  Patient is tolerating antibiotics without adverse effects.  Patient is denying constitutional symptoms.  Past Medical History:   Diagnosis Date   • BPPV (benign paroxysmal positional vertigo)    • Diabetes mellitus (Multi)    • HLD (hyperlipidemia)    • Hypertension    • Pituitary adenoma (Multi)    • Prostate cancer (Multi)        Past Surgical History:   Procedure Laterality Date   • CT ANGIO NECK  3/2/2017    CT NECK ANGIO W AND WO IV CONTRAST 3/2/2017 [facility] [place] [facility]   • CT HEAD ANGIO W AND WO IV CONTRAST  3/2/2017    CT HEAD ANGIO W AND WO IV CONTRAST 3/2/2017 [facility] [place] [facility]       Family History   Problem Relation Name Age of Onset   • No Known Problems  Mother     • No Known Problems Father         Social History     Socioeconomic History   • Marital status: Single   Tobacco Use   • Smoking status: Former     Types: Cigarettes   • Smokeless tobacco: Never   Substance and Sexual Activity   • Alcohol use: Not Currently   • Drug use: Never   • Sexual activity: Defer     Social Determinants of Health     Financial Resource Strain: Low Risk  (6/23/2024)    Overall Financial Resource Strain (CARDIA)    • Difficulty of Paying Living Expenses: Not hard at all   Transportation Needs: No Transportation Needs (6/23/2024)    PRAPARE - Transportation    • Lack of Transportation (Medical): No    • Lack of Transportation (Non-Medical): No   Housing Stability: Low Risk  (6/23/2024)    Housing Stability Vital Sign    • Unable to Pay for Housing in the Last Year: No    • Number of Places Lived in the Last Year: 1    • Unstable Housing in the Last Year: No       Vital signs: 94/61, 97.6, 83, 18, 96%     Objective  Physical Exam  Constitutional:       General: He is not in acute distress.  Eyes:      Extraocular Movements: Extraocular movements intact.   Pulmonary:      Effort: Pulmonary effort is normal.   Musculoskeletal:      Cervical back: Neck supple.   Neurological:      Mental Status: He is alert.   Psychiatric:         Mood and Affect: Mood normal.         Behavior: Behavior is cooperative.         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  Tamsulosin         Type 2 diabetes mellitus without complication, with long-term current use of insulin (Multi)     Glucoscan  Metformin  Insulin  Monitor labs           Essential hypertension     Monitor blood pressure  Lisinopril           HLD (hyperlipidemia)     Monitor LFTs, lipids  Statin         Dementia (Multi)     Assist with ADLs and care.  Monitor for safety  Aricept  Encourage all  abilities          UTI (urinary tract infection) - Primary     Continue antibiotic until complete 9/17/2024         Fall     Skin tear to right eyelid  Evaluated in hospital          Hospital records reviewed  Medications, treatments, and labs reviewed  Continue medications and treatments as listed in EMR  Discussed with nursing and therapy      Scribe Attestation  [place] Scribe   attest that this documentation has been prepared under the direction and in the presence of Martín Potts MD    Provider Attestation - Scribe documentation  All medical record entries made by the Scribe were at my direction and personally dictated by me. I have reviewed the chart and agree that the record accurately reflects my personal performance of the history, physical exam, discussion and plan.   Martín Potts MD      Electronically Signed By: Martín Potts MD   9/17/24  5:13 PM

## 2024-09-12 NOTE — PROGRESS NOTES
PROGRESS NOTE    Subjective   Chief complaint: Homero Stone is a 83 y.o. male who is an acute skilled patient being seen and evaluated for weakness    HPI:  HPI  An interactive audio and/or video telecommunication system which permits real time communications between the patient (at the originating site) and provider (at a distant site) was utilized to provide this telehealth service after obtaining verbal consent.  Patient had a fall this morning, hit his head.  Patient was noted to have low BP after fall.  Patient is not on anticoagulant.      Objective   Vital signs: 94/61, 97.6, 83, 18, 96%, blood sugar 150    Physical Exam  Constitutional:       General: He is not in acute distress.  Eyes:      Extraocular Movements: Extraocular movements intact.   Pulmonary:      Effort: Pulmonary effort is normal.   Musculoskeletal:      Cervical back: Neck supple.   Neurological:      Mental Status: He is alert.   Psychiatric:         Mood and Affect: Mood normal.         Behavior: Behavior is cooperative.         Assessment/Plan   Problem List Items Addressed This Visit       Type 2 diabetes mellitus without complication, with long-term current use of insulin (Multi)     Glucoscan  Metformin  Insulin  Monitor labs  FBG at goal         Essential hypertension     BP soft  Monitor blood pressure  Lisinopril           Fall - Primary     Given head injury and change in vital signs, will send patient to ED for further evaluation          Head injury     Patient struck head during a fall today.  Now noted to have low BP.  Will send patient to ED for further eval r/o brain bleed.          Medications, treatments, and labs reviewed  Continue medications and treatments as listed in EMR      Scribe Attestation  I, Rashad Lloyd   attest that this documentation has been prepared under the direction and in the presence of Martín Potts MD    Provider Attestation - Scribe documentation  All medical record entries made  by the Scribe were at my direction and personally dictated by me. I have reviewed the chart and agree that the record accurately reflects my personal performance of the history, physical exam, discussion and plan.   Martín Potts MD

## 2024-09-13 ENCOUNTER — TELEPHONE (OUTPATIENT)
Dept: PHARMACY | Facility: HOSPITAL | Age: 84
End: 2024-09-13
Payer: COMMERCIAL

## 2024-09-13 NOTE — PROGRESS NOTES
EDPD Note: Lab/Chart Reviewed    Reviewed Mr./Mrs./Ms. Homero Stone 's chart regarding a positive urine culture/result that was taken during their recent emergency room visit. The patient was transferred back to their rehab/LTC facility .Therefore, I have faxed this information to [facility] at fax number [phone] .    Susceptibility data from last 90 days.  Collected Specimen Info Organism Amoxicillin/Clavulanate Ampicillin Ampicillin/Sulbactam Cefazolin Cefazolin (uncomplicated UTIs only) Ciprofloxacin Gentamicin Nitrofurantoin Piperacillin/Tazobactam Trimethoprim/Sulfamethoxazole   09/09/24 Urine from Clean Catch/Voided Escherichia coli  S  R  I  S  S  I  S  S  S  S       No further follow up needed from EDPD Team.     Lizbet Drew  Pharmacy Resident

## 2024-09-14 LAB
ATRIAL RATE: 71 BPM
P AXIS: 52 DEGREES
P OFFSET: 179 MS
P ONSET: 134 MS
PR INTERVAL: 156 MS
Q ONSET: 212 MS
QRS COUNT: 11 BEATS
QRS DURATION: 80 MS
QT INTERVAL: 420 MS
QTC CALCULATION(BAZETT): 456 MS
QTC FREDERICIA: 444 MS
R AXIS: -20 DEGREES
T AXIS: 35 DEGREES
T OFFSET: 422 MS
VENTRICULAR RATE: 71 BPM

## 2024-09-17 PROBLEM — S09.90XA HEAD INJURY: Status: ACTIVE | Noted: 2024-09-17

## 2024-09-17 NOTE — ASSESSMENT & PLAN NOTE
Patient struck head during a fall today.  Now noted to have low BP.  Will send patient to ED for further eval r/o brain bleed.

## 2024-09-23 ENCOUNTER — NURSING HOME VISIT (OUTPATIENT)
Dept: POST ACUTE CARE | Facility: EXTERNAL LOCATION | Age: 84
End: 2024-09-23
Payer: COMMERCIAL

## 2024-09-23 DIAGNOSIS — F03.90 DEMENTIA, UNSPECIFIED DEMENTIA SEVERITY, UNSPECIFIED DEMENTIA TYPE, UNSPECIFIED WHETHER BEHAVIORAL, PSYCHOTIC, OR MOOD DISTURBANCE OR ANXIETY (MULTI): ICD-10-CM

## 2024-09-23 DIAGNOSIS — R13.10 DYSPHAGIA, UNSPECIFIED TYPE: ICD-10-CM

## 2024-09-23 DIAGNOSIS — E11.9 TYPE 2 DIABETES MELLITUS WITHOUT COMPLICATION, WITH LONG-TERM CURRENT USE OF INSULIN (MULTI): ICD-10-CM

## 2024-09-23 DIAGNOSIS — I10 ESSENTIAL HYPERTENSION: ICD-10-CM

## 2024-09-23 DIAGNOSIS — R53.1 WEAKNESS: Primary | ICD-10-CM

## 2024-09-23 DIAGNOSIS — Z79.4 TYPE 2 DIABETES MELLITUS WITHOUT COMPLICATION, WITH LONG-TERM CURRENT USE OF INSULIN (MULTI): ICD-10-CM

## 2024-09-23 PROCEDURE — 99308 SBSQ NF CARE LOW MDM 20: CPT | Performed by: INTERNAL MEDICINE

## 2024-09-23 NOTE — PROGRESS NOTES
PROGRESS NOTE    Subjective   Chief complaint: Homero Stone is a 83 y.o. male who is a long term care patient being seen and evaluated for weakness.    HPI:  HPI  Therapy is currently working with patient.  Speech therapy has evaluated patient and is to address dysphagia, focusing on safety with solids.  PT working with patient on performing therapeutic exercise and activities, neuromuscular education, gait training and safe transfers from various surfaces.  Patient seen and examined at the bedside, appears to be in no acute distress.  Nursing staff voicing no new concerns.    Objective   Vital signs: 102/57, 98.0, 79, 18, 96% blood sugar 122    Physical Exam  Constitutional:       General: He is not in acute distress.  Eyes:      Extraocular Movements: Extraocular movements intact.   Cardiovascular:      Rate and Rhythm: Normal rate and regular rhythm.   Pulmonary:      Effort: Pulmonary effort is normal.      Breath sounds: Normal breath sounds.   Abdominal:      General: Bowel sounds are normal.      Palpations: Abdomen is soft.   Musculoskeletal:      Cervical back: Neck supple.   Neurological:      Mental Status: He is alert.      Motor: Weakness present.   Psychiatric:         Mood and Affect: Mood normal.         Behavior: Behavior is cooperative.         Assessment/Plan   Problem List Items Addressed This Visit       Type 2 diabetes mellitus without complication, with long-term current use of insulin (Multi)     Glucoscan  Metformin  Insulin  Monitor labs           Essential hypertension     Monitor blood pressure  Continue antihypertensive         Weakness - Primary     Continue therapy, work towards established goals         Dementia (Multi)     Assist with ADLs and care.  Monitor for safety  Aricept  Encourage all abilities          Dysphagia     Speech therapy  Mechanical soft texture, thin liquid consistency          Medications, treatments, and labs reviewed  Continue medications and treatments  as listed in EMR    Scribe Attestation  I, Rashad Lloyd   attest that this documentation has been prepared under the direction and in the presence of Martín Potts MD    Provider Attestation - Scribe documentation  All medical record entries made by the Scribe were at my direction and personally dictated by me. I have reviewed the chart and agree that the record accurately reflects my personal performance of the history, physical exam, discussion and plan.   Martín Potts MD

## 2024-09-23 NOTE — LETTER
Patient: Homero Stone  : 1940    Encounter Date: 2024    PROGRESS NOTE    Subjective  Chief complaint: Homero Stone is a 83 y.o. male who is a long term care patient being seen and evaluated for weakness.    HPI:  HPI  Therapy is currently working with patient.  Speech therapy has evaluated patient and is to address dysphagia, focusing on safety with solids.  PT working with patient on performing therapeutic exercise and activities, neuromuscular education, gait training and safe transfers from various surfaces.  Patient seen and examined at the bedside, appears to be in no acute distress.  Nursing staff voicing no new concerns.    Objective  Vital signs: 102/57, 98.0, 79, 18, 96% blood sugar 122    Physical Exam  Constitutional:       General: He is not in acute distress.  Eyes:      Extraocular Movements: Extraocular movements intact.   Cardiovascular:      Rate and Rhythm: Normal rate and regular rhythm.   Pulmonary:      Effort: Pulmonary effort is normal.      Breath sounds: Normal breath sounds.   Abdominal:      General: Bowel sounds are normal.      Palpations: Abdomen is soft.   Musculoskeletal:      Cervical back: Neck supple.   Neurological:      Mental Status: He is alert.      Motor: Weakness present.   Psychiatric:         Mood and Affect: Mood normal.         Behavior: Behavior is cooperative.         Assessment/Plan  Problem List Items Addressed This Visit       Type 2 diabetes mellitus without complication, with long-term current use of insulin (Multi)     Glucoscan  Metformin  Insulin  Monitor labs           Essential hypertension     Monitor blood pressure  Continue antihypertensive         Weakness - Primary     Continue therapy, work towards established goals         Dementia (Multi)     Assist with ADLs and care.  Monitor for safety  Aricept  Encourage all abilities          Dysphagia     Speech therapy  Mechanical soft texture, thin liquid consistency           Medications, treatments, and labs reviewed  Continue medications and treatments as listed in EMR    Scribe Attestation  I, Rashad Lloyd   attest that this documentation has been prepared under the direction and in the presence of Martín Potts MD    Provider Attestation - Scribe documentation  All medical record entries made by the Scribe were at my direction and personally dictated by me. I have reviewed the chart and agree that the record accurately reflects my personal performance of the history, physical exam, discussion and plan.   Martín Potts MD            Electronically Signed By: Martín Potts MD   9/24/24  4:37 PM

## 2024-09-27 ENCOUNTER — NURSING HOME VISIT (OUTPATIENT)
Dept: POST ACUTE CARE | Facility: EXTERNAL LOCATION | Age: 84
End: 2024-09-27
Payer: COMMERCIAL

## 2024-09-27 DIAGNOSIS — E11.9 TYPE 2 DIABETES MELLITUS WITHOUT COMPLICATION, WITH LONG-TERM CURRENT USE OF INSULIN (MULTI): Primary | ICD-10-CM

## 2024-09-27 DIAGNOSIS — Z79.4 TYPE 2 DIABETES MELLITUS WITHOUT COMPLICATION, WITH LONG-TERM CURRENT USE OF INSULIN (MULTI): Primary | ICD-10-CM

## 2024-09-27 DIAGNOSIS — R63.4 WEIGHT LOSS: ICD-10-CM

## 2024-09-27 DIAGNOSIS — F03.90 DEMENTIA, UNSPECIFIED DEMENTIA SEVERITY, UNSPECIFIED DEMENTIA TYPE, UNSPECIFIED WHETHER BEHAVIORAL, PSYCHOTIC, OR MOOD DISTURBANCE OR ANXIETY (MULTI): ICD-10-CM

## 2024-09-27 DIAGNOSIS — Z78.9 SELF-CARE DEFICIT: ICD-10-CM

## 2024-09-27 PROCEDURE — 99309 SBSQ NF CARE MODERATE MDM 30: CPT | Performed by: NURSE PRACTITIONER

## 2024-09-27 NOTE — LETTER
Patient: Homero Stone  : 1940    Encounter Date: 2024    PROGRESS NOTE    Subjective  Chief complaint: Homero Stone is a 84 y.o. male who is a long term care patient being seen and evaluated for poor appetite     HPI: Nursing reports that his inake remains poor and reports further weight loss. He is pleasant and talkative. Is taking supplement. Appears underweight. Dietitian is monitoring.  Will increase remeron dose. Current weight -176.6   HPI      Objective  Vital signs: 100/68-79-18-98.0     Physical Exam  Vitals and nursing note reviewed.   Constitutional:       General: He is not in acute distress.     Appearance: He is well-groomed and underweight.   Eyes:      Extraocular Movements: Extraocular movements intact.   Cardiovascular:      Rate and Rhythm: Normal rate and regular rhythm.   Pulmonary:      Effort: Pulmonary effort is normal.      Breath sounds: Normal breath sounds.      Comments: Lungs clear   Abdominal:      General: Bowel sounds are normal.      Palpations: Abdomen is soft.   Musculoskeletal:      Cervical back: Neck supple.      Right lower leg: No edema.      Left lower leg: No edema.   Neurological:      Mental Status: He is alert.   Psychiatric:         Mood and Affect: Mood normal.         Behavior: Behavior is cooperative.         Cognition and Memory: Cognition is impaired. Memory is impaired.         Assessment/Plan  Problem List Items Addressed This Visit       Type 2 diabetes mellitus without complication, with long-term current use of insulin (Multi) - Primary     Glucoscan  Metformin  Insulin  Monitor labs           Self-care deficit     Requires extensive assistance from staff for all HOC   Encourage all abilities          Dementia (Multi)     Assist with ADLs and care.  Monitor for safety  Aricept  Encourage all abilities          Weight loss     Appetite remains poor   Dietitian monitoring   Supplements   Staff to encourage all intake    Will increase  remeron   Has had 5% weight loss past 3 months           Medications, treatments, and labs reviewed  Continue medications and treatments as listed in EMR    COREY Roche      Electronically Signed By: COREY Roche   9/29/24  9:05 AM

## 2024-09-27 NOTE — PROGRESS NOTES
PROGRESS NOTE    Subjective   Chief complaint: Homero Stone is a 84 y.o. male who is a long term care patient being seen and evaluated for poor appetite     HPI: Nursing reports that his inake remains poor and reports further weight loss. He is pleasant and talkative. Is taking supplement. Appears underweight. Dietitian is monitoring.  Will increase remeron dose. Current weight -176.6   HPI      Objective   Vital signs: 100/68-79-18-98.0     Physical Exam  Vitals and nursing note reviewed.   Constitutional:       General: He is not in acute distress.     Appearance: He is well-groomed and underweight.   Eyes:      Extraocular Movements: Extraocular movements intact.   Cardiovascular:      Rate and Rhythm: Normal rate and regular rhythm.   Pulmonary:      Effort: Pulmonary effort is normal.      Breath sounds: Normal breath sounds.      Comments: Lungs clear   Abdominal:      General: Bowel sounds are normal.      Palpations: Abdomen is soft.   Musculoskeletal:      Cervical back: Neck supple.      Right lower leg: No edema.      Left lower leg: No edema.   Neurological:      Mental Status: He is alert.   Psychiatric:         Mood and Affect: Mood normal.         Behavior: Behavior is cooperative.         Cognition and Memory: Cognition is impaired. Memory is impaired.         Assessment/Plan   Problem List Items Addressed This Visit       Type 2 diabetes mellitus without complication, with long-term current use of insulin (Multi) - Primary     Glucoscan  Metformin  Insulin  Monitor labs           Self-care deficit     Requires extensive assistance from staff for all HOC   Encourage all abilities          Dementia (Multi)     Assist with ADLs and care.  Monitor for safety  Aricept  Encourage all abilities          Weight loss     Appetite remains poor   Dietitian monitoring   Supplements   Staff to encourage all intake    Will increase remeron   Has had 5% weight loss past 3 months           Medications,  treatments, and labs reviewed  Continue medications and treatments as listed in EMR    Yocasta Diaz, APRN-CNP

## 2024-09-29 PROBLEM — R63.4 WEIGHT LOSS: Status: ACTIVE | Noted: 2024-09-29

## 2024-09-29 PROBLEM — Z78.9 SELF-CARE DEFICIT: Status: ACTIVE | Noted: 2024-06-29

## 2024-09-29 NOTE — ASSESSMENT & PLAN NOTE
Appetite remains poor   Dietitian monitoring   Supplements   Staff to encourage all intake    Will increase remeron   Has had 5% weight loss past 3 months

## 2024-09-29 NOTE — ASSESSMENT & PLAN NOTE
Assist with ADLs and care.  Monitor for safety  Aricept  Encourage all abilities    [General Appearance - Well Developed] : well developed [Normal Appearance] : normal appearance [Well Groomed] : well groomed [General Appearance - Well Nourished] : well nourished [No Deformities] : no deformities [General Appearance - In No Acute Distress] : no acute distress [Normal Conjunctiva] : the conjunctiva exhibited no abnormalities [Eyelids - No Xanthelasma] : the eyelids demonstrated no xanthelasmas [Normal Oropharynx] : normal oropharynx [II] : II [Neck Appearance] : the appearance of the neck was normal [Neck Cervical Mass (___cm)] : no neck mass was observed [Jugular Venous Distention Increased] : there was no jugular-venous distention [Thyroid Diffuse Enlargement] : the thyroid was not enlarged [Thyroid Nodule] : there were no palpable thyroid nodules [Heart Rate And Rhythm] : heart rate and rhythm were normal [Heart Sounds] : normal S1 and S2 [Murmurs] : no murmurs present [Respiration, Rhythm And Depth] : normal respiratory rhythm and effort [Exaggerated Use Of Accessory Muscles For Inspiration] : no accessory muscle use [Abdomen Soft] : soft [Abdomen Tenderness] : non-tender [Abdomen Mass (___ Cm)] : no abdominal mass palpated [Abnormal Walk] : normal gait [Gait - Sufficient For Exercise Testing] : the gait was sufficient for exercise testing [Nail Clubbing] : no clubbing of the fingernails [Cyanosis, Localized] : no localized cyanosis [Petechial Hemorrhages (___cm)] : no petechial hemorrhages [Skin Color & Pigmentation] : normal skin color and pigmentation [] : no rash [No Venous Stasis] : no venous stasis [Skin Lesions] : no skin lesions [No Skin Ulcers] : no skin ulcer [No Xanthoma] : no  xanthoma was observed [Deep Tendon Reflexes (DTR)] : deep tendon reflexes were 2+ and symmetric [Sensation] : the sensory exam was normal to light touch and pinprick [No Focal Deficits] : no focal deficits [Oriented To Time, Place, And Person] : oriented to person, place, and time [Impaired Insight] : insight and judgment were intact [Affect] : the affect was normal [Auscultation Breath Sounds / Voice Sounds] : lungs were clear to auscultation bilaterally [FreeTextEntry1] : I:E ratio 1:3; clear

## 2024-10-01 ENCOUNTER — NURSING HOME VISIT (OUTPATIENT)
Dept: POST ACUTE CARE | Facility: EXTERNAL LOCATION | Age: 84
End: 2024-10-01
Payer: COMMERCIAL

## 2024-10-01 DIAGNOSIS — F03.90 DEMENTIA, UNSPECIFIED DEMENTIA SEVERITY, UNSPECIFIED DEMENTIA TYPE, UNSPECIFIED WHETHER BEHAVIORAL, PSYCHOTIC, OR MOOD DISTURBANCE OR ANXIETY (MULTI): Primary | ICD-10-CM

## 2024-10-01 DIAGNOSIS — N40.1 BENIGN LOCALIZED HYPERPLASIA OF PROSTATE WITH URINARY OBSTRUCTION AND LOWER URINARY TRACT SYMPTOMS: ICD-10-CM

## 2024-10-01 DIAGNOSIS — N13.9 BENIGN LOCALIZED HYPERPLASIA OF PROSTATE WITH URINARY OBSTRUCTION AND LOWER URINARY TRACT SYMPTOMS: ICD-10-CM

## 2024-10-01 DIAGNOSIS — Z79.4 TYPE 2 DIABETES MELLITUS WITHOUT COMPLICATION, WITH LONG-TERM CURRENT USE OF INSULIN (MULTI): ICD-10-CM

## 2024-10-01 DIAGNOSIS — E11.9 TYPE 2 DIABETES MELLITUS WITHOUT COMPLICATION, WITH LONG-TERM CURRENT USE OF INSULIN (MULTI): ICD-10-CM

## 2024-10-01 DIAGNOSIS — I10 ESSENTIAL HYPERTENSION: ICD-10-CM

## 2024-10-01 DIAGNOSIS — E78.5 HYPERLIPIDEMIA, UNSPECIFIED HYPERLIPIDEMIA TYPE: ICD-10-CM

## 2024-10-01 PROCEDURE — 99309 SBSQ NF CARE MODERATE MDM 30: CPT | Performed by: INTERNAL MEDICINE

## 2024-10-01 NOTE — PROGRESS NOTES
PROGRESS NOTE    Subjective   Chief complaint: Homero Stone is a 84 y.o. male who is a long term care patient being seen and evaluated for monthly general medical care and follow-up    HPI:  HPI  Patient presents for general medical care and f/u.  Patient seen and examined at bedside.  No issues per nursing.  Patient has no acute complaints.  Patient has dementia and requires assist with ADLs.  DM, denies polydipsia polyuria polyphagia.  BPH stable, denies difficulty voiding, urinary frequency, and weak stream.  HLD denies muscle aches.  Mentation baseline, no acute distress.    Objective   Vital signs: 110/70, 98.0, 79, 18, 96% blood sugar 112    Physical Exam  Constitutional:       General: He is not in acute distress.  Eyes:      Extraocular Movements: Extraocular movements intact.   Cardiovascular:      Rate and Rhythm: Normal rate and regular rhythm.   Pulmonary:      Effort: Pulmonary effort is normal.      Breath sounds: Normal breath sounds.      Comments: Lungs clear   Abdominal:      General: Bowel sounds are normal.      Palpations: Abdomen is soft.   Musculoskeletal:      Cervical back: Neck supple.   Neurological:      Mental Status: He is alert.   Psychiatric:         Mood and Affect: Mood normal.         Behavior: Behavior is cooperative.         Cognition and Memory: Cognition is impaired. Memory is impaired.         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  Tamsulosin         Type 2 diabetes mellitus without complication, with long-term current use of insulin (Multi)     Glucoscan  Metformin  Insulin  Monitor labs           Essential hypertension     Monitor blood pressure  Continue antihypertensive         HLD (hyperlipidemia)    Dementia - Primary     Assist with ADLs and care.  Monitor for safety  Aricept  Encourage all abilities            Medications, treatments, and labs reviewed  Continue medications and treatments as listed in EMR    Scribe Attestation  I, Rashad Lloyd   attest that this documentation has been prepared under the direction and in the presence of Martín Potts MD    Provider Attestation - Scribe documentation  All medical record entries made by the Scribe were at my direction and personally dictated by me. I have reviewed the chart and agree that the record accurately reflects my personal performance of the history, physical exam, discussion and plan.   Martín Potts MD

## 2024-10-01 NOTE — LETTER
Patient: Homero Stone  : 1940    Encounter Date: 10/01/2024    PROGRESS NOTE    Subjective  Chief complaint: Homero Stone is a 84 y.o. male who is a long term care patient being seen and evaluated for monthly general medical care and follow-up    HPI:  HPI  Patient presents for general medical care and f/u.  Patient seen and examined at bedside.  No issues per nursing.  Patient has no acute complaints.  Patient has dementia and requires assist with ADLs.  DM, denies polydipsia polyuria polyphagia.  BPH stable, denies difficulty voiding, urinary frequency, and weak stream.  HLD denies muscle aches.  Mentation baseline, no acute distress.    Objective  Vital signs: 110/70, 98.0, 79, 18, 96% blood sugar 112    Physical Exam  Constitutional:       General: He is not in acute distress.  Eyes:      Extraocular Movements: Extraocular movements intact.   Cardiovascular:      Rate and Rhythm: Normal rate and regular rhythm.   Pulmonary:      Effort: Pulmonary effort is normal.      Breath sounds: Normal breath sounds.      Comments: Lungs clear   Abdominal:      General: Bowel sounds are normal.      Palpations: Abdomen is soft.   Musculoskeletal:      Cervical back: Neck supple.   Neurological:      Mental Status: He is alert.   Psychiatric:         Mood and Affect: Mood normal.         Behavior: Behavior is cooperative.         Cognition and Memory: Cognition is impaired. Memory is impaired.         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  Tamsulosin         Type 2 diabetes mellitus without complication, with long-term current use of insulin (Multi)     Glucoscan  Metformin  Insulin  Monitor labs           Essential hypertension     Monitor blood pressure  Continue antihypertensive         HLD (hyperlipidemia)    Dementia - Primary     Assist with ADLs and  care.  Monitor for safety  Aricept  Encourage all abilities           Medications, treatments, and labs reviewed  Continue medications and treatments as listed in EMR    Scribe Attestation  I, Rashad Lloyd   attest that this documentation has been prepared under the direction and in the presence of Martín Potts MD    Provider Attestation - Scribe documentation  All medical record entries made by the Scribe were at my direction and personally dictated by me. I have reviewed the chart and agree that the record accurately reflects my personal performance of the history, physical exam, discussion and plan.   Martín Potts MD            Electronically Signed By: Martín Potts MD   10/2/24  1:28 PM

## 2024-10-08 ENCOUNTER — NURSING HOME VISIT (OUTPATIENT)
Dept: POST ACUTE CARE | Facility: EXTERNAL LOCATION | Age: 84
End: 2024-10-08
Payer: COMMERCIAL

## 2024-10-08 DIAGNOSIS — N13.9 BENIGN LOCALIZED HYPERPLASIA OF PROSTATE WITH URINARY OBSTRUCTION AND LOWER URINARY TRACT SYMPTOMS: ICD-10-CM

## 2024-10-08 DIAGNOSIS — N40.1 BENIGN LOCALIZED HYPERPLASIA OF PROSTATE WITH URINARY OBSTRUCTION AND LOWER URINARY TRACT SYMPTOMS: ICD-10-CM

## 2024-10-08 DIAGNOSIS — R53.1 WEAKNESS: Primary | ICD-10-CM

## 2024-10-08 DIAGNOSIS — I10 ESSENTIAL HYPERTENSION: ICD-10-CM

## 2024-10-08 DIAGNOSIS — F03.90 DEMENTIA, UNSPECIFIED DEMENTIA SEVERITY, UNSPECIFIED DEMENTIA TYPE, UNSPECIFIED WHETHER BEHAVIORAL, PSYCHOTIC, OR MOOD DISTURBANCE OR ANXIETY (MULTI): ICD-10-CM

## 2024-10-08 PROCEDURE — 99309 SBSQ NF CARE MODERATE MDM 30: CPT | Performed by: INTERNAL MEDICINE

## 2024-10-08 NOTE — PROGRESS NOTES
PROGRESS NOTE    Subjective   Chief complaint: Homero Stone is a 84 y.o. male who is a long term care patient being seen and evaluated for weakness.    HPI:  HPI  Patient presents for f/u therapy and general medical care.  Therapy has been working with the patient to improve strength, endurance, ADLs, and transfers d/t generalized weakness.  Patient was seen and examined at the bedside, appears to be in no acute distress.  Nursing reporting no new concerns.  Patient denies constitutional symptoms.    Objective   Vital signs: 100/70, 97.7, 95, 18, 97%    Physical Exam  Constitutional:       General: He is not in acute distress.  Eyes:      Extraocular Movements: Extraocular movements intact.   Cardiovascular:      Rate and Rhythm: Normal rate and regular rhythm.   Pulmonary:      Effort: Pulmonary effort is normal.      Breath sounds: Normal breath sounds.      Comments: Lungs clear   Abdominal:      General: Bowel sounds are normal.      Palpations: Abdomen is soft.   Musculoskeletal:      Cervical back: Neck supple.   Neurological:      Mental Status: He is alert.   Psychiatric:         Mood and Affect: Mood normal.         Behavior: Behavior is cooperative.         Cognition and Memory: Cognition is impaired. Memory is impaired.         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  Tamsulosin         Essential hypertension     Monitor blood pressure  Continue antihypertensive         Dementia     Assist with ADLs and care.  Monitor for safety  Aricept  Encourage all abilities          Weakness - Primary     Continue therapy, work towards goals          Medications, treatments, and labs reviewed  Continue medications and treatments as listed in EMR    Scribe Attestation  I, Rashad Lloyd   attest that this documentation has been prepared under the direction  and in the presence of Martín Potts MD    Provider Attestation - Scribe documentation  All medical record entries made by the Scribe were at my direction and personally dictated by me. I have reviewed the chart and agree that the record accurately reflects my personal performance of the history, physical exam, discussion and plan.   Martín Potts MD

## 2024-10-08 NOTE — LETTER
Patient: Homero Stone  : 1940    Encounter Date: 10/08/2024    PROGRESS NOTE    Subjective  Chief complaint: Homero Stone is a 84 y.o. male who is a long term care patient being seen and evaluated for weakness.    HPI:  HPI  Patient presents for f/u therapy and general medical care.  Therapy has been working with the patient to improve strength, endurance, ADLs, and transfers d/t generalized weakness.  Patient was seen and examined at the bedside, appears to be in no acute distress.  Nursing reporting no new concerns.  Patient denies constitutional symptoms.    Objective  Vital signs: 100/70, 97.7, 95, 18, 97%    Physical Exam  Constitutional:       General: He is not in acute distress.  Eyes:      Extraocular Movements: Extraocular movements intact.   Cardiovascular:      Rate and Rhythm: Normal rate and regular rhythm.   Pulmonary:      Effort: Pulmonary effort is normal.      Breath sounds: Normal breath sounds.      Comments: Lungs clear   Abdominal:      General: Bowel sounds are normal.      Palpations: Abdomen is soft.   Musculoskeletal:      Cervical back: Neck supple.   Neurological:      Mental Status: He is alert.   Psychiatric:         Mood and Affect: Mood normal.         Behavior: Behavior is cooperative.         Cognition and Memory: Cognition is impaired. Memory is impaired.         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  Tamsulosin         Essential hypertension     Monitor blood pressure  Continue antihypertensive         Dementia     Assist with ADLs and care.  Monitor for safety  Aricept  Encourage all abilities          Weakness - Primary     Continue therapy, work towards goals          Medications, treatments, and labs reviewed  Continue medications and treatments as listed in EMR    Rashad Solomonestation  I, Rashad Lloyd    attest that this documentation has been prepared under the direction and in the presence of Martín Potts MD    Provider Attestation - Scribe documentation  All medical record entries made by the Scribe were at my direction and personally dictated by me. I have reviewed the chart and agree that the record accurately reflects my personal performance of the history, physical exam, discussion and plan.   Martín Potts MD            Electronically Signed By: Martín Potts MD   10/9/24 12:02 PM

## 2024-10-14 ENCOUNTER — NURSING HOME VISIT (OUTPATIENT)
Dept: POST ACUTE CARE | Facility: EXTERNAL LOCATION | Age: 84
End: 2024-10-14
Payer: COMMERCIAL

## 2024-10-14 DIAGNOSIS — F03.90 DEMENTIA, UNSPECIFIED DEMENTIA SEVERITY, UNSPECIFIED DEMENTIA TYPE, UNSPECIFIED WHETHER BEHAVIORAL, PSYCHOTIC, OR MOOD DISTURBANCE OR ANXIETY (MULTI): ICD-10-CM

## 2024-10-14 DIAGNOSIS — I10 ESSENTIAL HYPERTENSION: ICD-10-CM

## 2024-10-14 DIAGNOSIS — R13.10 DYSPHAGIA, UNSPECIFIED TYPE: ICD-10-CM

## 2024-10-14 DIAGNOSIS — R53.1 WEAKNESS: Primary | ICD-10-CM

## 2024-10-14 PROCEDURE — 99308 SBSQ NF CARE LOW MDM 20: CPT | Performed by: INTERNAL MEDICINE

## 2024-10-14 NOTE — PROGRESS NOTES
PROGRESS NOTE    Subjective   Chief complaint: Homero Stone is a 84 y.o. male who is a long term care patient being seen and evaluated for weakness.    HPI:  HPI  Patient is currently working with therapy due to generalized weakness.  Physical therapy is working with patient on therapeutic exercise and activities, neuromuscular education, ADLs and self-care.  Speech therapy is working patient to address dysphagia.  SLP working to address safety with solids.  Patient appears to be in no acute distress.  Denies chest pain, shortness of breath, nausea vomiting fever chills.    Objective   Vital signs: 110/70, 97.7, 95, 18, 97%    Physical Exam  Eyes:      Extraocular Movements: Extraocular movements intact.   Cardiovascular:      Rate and Rhythm: Normal rate and regular rhythm.   Pulmonary:      Comments: Lungs clear   Abdominal:      Palpations: Abdomen is soft.   Musculoskeletal:      Cervical back: Neck supple.   Neurological:      Mental Status: He is alert.      Motor: Weakness present.   Psychiatric:         Mood and Affect: Mood normal.         Behavior: Behavior is cooperative.         Cognition and Memory: Cognition is impaired. Memory is impaired.         Assessment/Plan   Problem List Items Addressed This Visit       Essential hypertension     Monitor blood pressure  Continue antihypertensive         Dementia     Assist with ADLs and care.  Monitor for safety  Aricept  Encourage all abilities          Dysphagia     Speech therapy  Mechanical soft texture, thin liquid consistency         Weakness - Primary     Continue therapy, work towards goals          Medications, treatments, and labs reviewed  Continue medications and treatments as listed in EMR    Scribe Attestation  I, Rashad Lloyd   attest that this documentation has been prepared under the direction and in the presence of Martín Potts MD    Provider Attestation - Scribe documentation  All medical record entries made by the Scribe  were at my direction and personally dictated by me. I have reviewed the chart and agree that the record accurately reflects my personal performance of the history, physical exam, discussion and plan.   Martín Potts MD

## 2024-10-14 NOTE — LETTER
Patient: Homero Stone  : 1940    Encounter Date: 10/14/2024    PROGRESS NOTE    Subjective  Chief complaint: Homero Stone is a 84 y.o. male who is a long term care patient being seen and evaluated for weakness.    HPI:  HPI  Patient is currently working with therapy due to generalized weakness.  Physical therapy is working with patient on therapeutic exercise and activities, neuromuscular education, ADLs and self-care.  Speech therapy is working patient to address dysphagia.  SLP working to address safety with solids.  Patient appears to be in no acute distress.  Denies chest pain, shortness of breath, nausea vomiting fever chills.    Objective  Vital signs: 110/70, 97.7, 95, 18, 97%    Physical Exam  Eyes:      Extraocular Movements: Extraocular movements intact.   Cardiovascular:      Rate and Rhythm: Normal rate and regular rhythm.   Pulmonary:      Comments: Lungs clear   Abdominal:      Palpations: Abdomen is soft.   Musculoskeletal:      Cervical back: Neck supple.   Neurological:      Mental Status: He is alert.      Motor: Weakness present.   Psychiatric:         Mood and Affect: Mood normal.         Behavior: Behavior is cooperative.         Cognition and Memory: Cognition is impaired. Memory is impaired.         Assessment/Plan  Problem List Items Addressed This Visit       Essential hypertension     Monitor blood pressure  Continue antihypertensive         Dementia     Assist with ADLs and care.  Monitor for safety  Aricept  Encourage all abilities          Dysphagia     Speech therapy  Mechanical soft texture, thin liquid consistency         Weakness - Primary     Continue therapy, work towards goals          Medications, treatments, and labs reviewed  Continue medications and treatments as listed in EMR    Scribe Attestation  I, Rashad Lloyd   attest that this documentation has been prepared under the direction and in the presence of Martín Potts MD    Provider  Attestation - Scribe documentation  All medical record entries made by the Scribe were at my direction and personally dictated by me. I have reviewed the chart and agree that the record accurately reflects my personal performance of the history, physical exam, discussion and plan.   Martín Potts MD            Electronically Signed By: Martín Potts MD   10/15/24  4:40 PM

## 2024-10-21 ENCOUNTER — NURSING HOME VISIT (OUTPATIENT)
Dept: POST ACUTE CARE | Facility: EXTERNAL LOCATION | Age: 84
End: 2024-10-21
Payer: COMMERCIAL

## 2024-10-21 DIAGNOSIS — R53.1 WEAKNESS: Primary | ICD-10-CM

## 2024-10-21 DIAGNOSIS — I10 ESSENTIAL HYPERTENSION: ICD-10-CM

## 2024-10-21 DIAGNOSIS — F03.90 DEMENTIA, UNSPECIFIED DEMENTIA SEVERITY, UNSPECIFIED DEMENTIA TYPE, UNSPECIFIED WHETHER BEHAVIORAL, PSYCHOTIC, OR MOOD DISTURBANCE OR ANXIETY (MULTI): ICD-10-CM

## 2024-10-21 DIAGNOSIS — Z79.4 TYPE 2 DIABETES MELLITUS WITHOUT COMPLICATION, WITH LONG-TERM CURRENT USE OF INSULIN (MULTI): ICD-10-CM

## 2024-10-21 DIAGNOSIS — E11.9 TYPE 2 DIABETES MELLITUS WITHOUT COMPLICATION, WITH LONG-TERM CURRENT USE OF INSULIN (MULTI): ICD-10-CM

## 2024-10-21 PROCEDURE — 99308 SBSQ NF CARE LOW MDM 20: CPT | Performed by: INTERNAL MEDICINE

## 2024-10-21 NOTE — LETTER
Patient: Homero Stone  : 1940    Encounter Date: 10/21/2024    PROGRESS NOTE    Subjective  Chief complaint: Homero Stone is a 84 y.o. male who is a long term care patient being seen and evaluated for weakness    HPI:  HPI  Patient is currently working with therapy due to generalized weakness.  PT is working with patient on performing therapeutic exercise and activities to improve strength endurance range of motion for independence with functional mobility and task.  Patient is seen and examined at the bedside, appears to be in no acute distress.  Mentation at baseline.    Objective  Vital signs: 111/59, 97.7, 95, 18, 97% blood sugar 96    Physical Exam  Eyes:      Extraocular Movements: Extraocular movements intact.   Cardiovascular:      Rate and Rhythm: Normal rate and regular rhythm.   Pulmonary:      Comments: Lungs clear   Abdominal:      Palpations: Abdomen is soft.   Musculoskeletal:      Cervical back: Neck supple.   Neurological:      Mental Status: He is alert.      Motor: Weakness present.   Psychiatric:         Mood and Affect: Mood normal.         Behavior: Behavior is cooperative.         Cognition and Memory: Cognition is impaired. Memory is impaired.         Assessment/Plan  Problem List Items Addressed This Visit       Type 2 diabetes mellitus without complication, with long-term current use of insulin (Multi)     Glucoscan  Metformin  Insulin  Monitor labs           Essential hypertension     Monitor blood pressure  Continue antihypertensive         Dementia     Assist with ADLs and care.  Monitor for safety  Aricept  Encourage all abilities          Weakness - Primary     Work with therapy towards goals established          Medications, treatments, and labs reviewed  Continue medications and treatments as listed in EMR    Scribe Attestation  I, Rashad Lloyd   attest that this documentation has been prepared under the direction and in the presence of Martín Potts  MD    Provider Attestation - Scribe documentation  All medical record entries made by the Scribe were at my direction and personally dictated by me. I have reviewed the chart and agree that the record accurately reflects my personal performance of the history, physical exam, discussion and plan.   Martín Potts MD            Electronically Signed By: Martín Potts MD   10/22/24  1:43 PM

## 2024-10-21 NOTE — PROGRESS NOTES
PROGRESS NOTE    Subjective   Chief complaint: Homero Stone is a 84 y.o. male who is a long term care patient being seen and evaluated for weakness    HPI:  HPI  Patient is currently working with therapy due to generalized weakness.  PT is working with patient on performing therapeutic exercise and activities to improve strength endurance range of motion for independence with functional mobility and task.  Patient is seen and examined at the bedside, appears to be in no acute distress.  Mentation at baseline.    Objective   Vital signs: 111/59, 97.7, 95, 18, 97% blood sugar 96    Physical Exam  Eyes:      Extraocular Movements: Extraocular movements intact.   Cardiovascular:      Rate and Rhythm: Normal rate and regular rhythm.   Pulmonary:      Comments: Lungs clear   Abdominal:      Palpations: Abdomen is soft.   Musculoskeletal:      Cervical back: Neck supple.   Neurological:      Mental Status: He is alert.      Motor: Weakness present.   Psychiatric:         Mood and Affect: Mood normal.         Behavior: Behavior is cooperative.         Cognition and Memory: Cognition is impaired. Memory is impaired.         Assessment/Plan   Problem List Items Addressed This Visit       Type 2 diabetes mellitus without complication, with long-term current use of insulin (Multi)     Glucoscan  Metformin  Insulin  Monitor labs           Essential hypertension     Monitor blood pressure  Continue antihypertensive         Dementia     Assist with ADLs and care.  Monitor for safety  Aricept  Encourage all abilities          Weakness - Primary     Work with therapy towards goals established          Medications, treatments, and labs reviewed  Continue medications and treatments as listed in EMR    Scribe Attestation  I, Rashad Lloyd   attest that this documentation has been prepared under the direction and in the presence of Martín Potts MD    Provider Attestation - Scribe documentation  All medical record  entries made by the Scribe were at my direction and personally dictated by me. I have reviewed the chart and agree that the record accurately reflects my personal performance of the history, physical exam, discussion and plan.   Martín Potts MD

## 2024-10-28 ENCOUNTER — NURSING HOME VISIT (OUTPATIENT)
Dept: POST ACUTE CARE | Facility: EXTERNAL LOCATION | Age: 84
End: 2024-10-28
Payer: COMMERCIAL

## 2024-10-28 DIAGNOSIS — F03.90 DEMENTIA, UNSPECIFIED DEMENTIA SEVERITY, UNSPECIFIED DEMENTIA TYPE, UNSPECIFIED WHETHER BEHAVIORAL, PSYCHOTIC, OR MOOD DISTURBANCE OR ANXIETY (MULTI): ICD-10-CM

## 2024-10-28 DIAGNOSIS — R53.1 WEAKNESS: Primary | ICD-10-CM

## 2024-10-28 DIAGNOSIS — Z79.4 TYPE 2 DIABETES MELLITUS WITHOUT COMPLICATION, WITH LONG-TERM CURRENT USE OF INSULIN (MULTI): ICD-10-CM

## 2024-10-28 DIAGNOSIS — E11.9 TYPE 2 DIABETES MELLITUS WITHOUT COMPLICATION, WITH LONG-TERM CURRENT USE OF INSULIN (MULTI): ICD-10-CM

## 2024-10-28 DIAGNOSIS — I10 ESSENTIAL HYPERTENSION: ICD-10-CM

## 2024-10-28 PROCEDURE — 99308 SBSQ NF CARE LOW MDM 20: CPT | Performed by: INTERNAL MEDICINE

## 2024-11-04 ENCOUNTER — NURSING HOME VISIT (OUTPATIENT)
Dept: POST ACUTE CARE | Facility: EXTERNAL LOCATION | Age: 84
End: 2024-11-04
Payer: COMMERCIAL

## 2024-11-04 DIAGNOSIS — F03.90 DEMENTIA, UNSPECIFIED DEMENTIA SEVERITY, UNSPECIFIED DEMENTIA TYPE, UNSPECIFIED WHETHER BEHAVIORAL, PSYCHOTIC, OR MOOD DISTURBANCE OR ANXIETY (MULTI): Primary | ICD-10-CM

## 2024-11-04 DIAGNOSIS — I10 ESSENTIAL HYPERTENSION: ICD-10-CM

## 2024-11-04 DIAGNOSIS — N40.1 BENIGN LOCALIZED HYPERPLASIA OF PROSTATE WITH URINARY OBSTRUCTION AND LOWER URINARY TRACT SYMPTOMS: ICD-10-CM

## 2024-11-04 DIAGNOSIS — E78.5 HYPERLIPIDEMIA, UNSPECIFIED HYPERLIPIDEMIA TYPE: ICD-10-CM

## 2024-11-04 DIAGNOSIS — E11.9 TYPE 2 DIABETES MELLITUS WITHOUT COMPLICATION, WITH LONG-TERM CURRENT USE OF INSULIN (MULTI): ICD-10-CM

## 2024-11-04 DIAGNOSIS — N13.9 BENIGN LOCALIZED HYPERPLASIA OF PROSTATE WITH URINARY OBSTRUCTION AND LOWER URINARY TRACT SYMPTOMS: ICD-10-CM

## 2024-11-04 DIAGNOSIS — Z79.4 TYPE 2 DIABETES MELLITUS WITHOUT COMPLICATION, WITH LONG-TERM CURRENT USE OF INSULIN (MULTI): ICD-10-CM

## 2024-11-04 PROCEDURE — 99309 SBSQ NF CARE MODERATE MDM 30: CPT | Performed by: INTERNAL MEDICINE

## 2024-11-04 NOTE — PROGRESS NOTES
PROGRESS NOTE    Subjective   Chief complaint: Homero Stone is a 84 y.o. male who is a long term care patient being seen and evaluated for monthly general medical care and follow-upmonthly general medical care and follow-upmonthly general medical care and follow-up    HPI:  HPI  Patient presents for general medical care and f/u.  Patient seen and examined at bedside.  No issues per nursing.  Patient has no acute complaints.  Patient has dementia and requires assist with ADLs.  HTN Denies chest pain and headache.  DM, denies polydipsia polyuria polyphagia.  BPH stable, denies difficulty voiding, urinary frequency, and weak stream.  HLD denies muscle aches.  Mentation at baseline, no acute distress    Objective   Vital signs: 137/71, 98.1, 68, 17, 98% blood sugar 100    Physical Exam  Constitutional:       General: He is not in acute distress.  Eyes:      Extraocular Movements: Extraocular movements intact.   Cardiovascular:      Rate and Rhythm: Normal rate and regular rhythm.   Pulmonary:      Effort: Pulmonary effort is normal.      Breath sounds: Normal breath sounds.   Abdominal:      General: Bowel sounds are normal.      Palpations: Abdomen is soft.   Musculoskeletal:      Cervical back: Neck supple.      Right lower leg: No edema.      Left lower leg: No edema.   Neurological:      Mental Status: He is alert.      Comments: A&O X1-2   Psychiatric:         Mood and Affect: Mood normal.         Behavior: Behavior is cooperative.         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  Tamsulosin         Type 2 diabetes mellitus without complication, with long-term current use of insulin (Multi)     Glucoscan  Metformin  Insulin  Monitor labs           Essential hypertension     Monitor blood pressure  Continue antihypertensive         HLD (hyperlipidemia)     Monitor  LFTs, lipids  Statin         Dementia - Primary     Assist with ADLs and care.  Monitor for safety  Aricept  Encourage all abilities           Medications, treatments, and labs reviewed  Continue medications and treatments as listed in EMR    Scribe Attestation  I, Rashad Lloyd   attest that this documentation has been prepared under the direction and in the presence of Martín Potts MD    Provider Attestation - Scribe documentation  All medical record entries made by the Scribe were at my direction and personally dictated by me. I have reviewed the chart and agree that the record accurately reflects my personal performance of the history, physical exam, discussion and plan.   Martín Potts MD

## 2024-11-04 NOTE — LETTER
Patient: Homero Stone  : 1940    Encounter Date: 2024    PROGRESS NOTE    Subjective  Chief complaint: Homero Stone is a 84 y.o. male who is a long term care patient being seen and evaluated for monthly general medical care and follow-upmonthly general medical care and follow-upmonthly general medical care and follow-up    HPI:  HPI  Patient presents for general medical care and f/u.  Patient seen and examined at bedside.  No issues per nursing.  Patient has no acute complaints.  Patient has dementia and requires assist with ADLs.  HTN Denies chest pain and headache.  DM, denies polydipsia polyuria polyphagia.  BPH stable, denies difficulty voiding, urinary frequency, and weak stream.  HLD denies muscle aches.  Mentation at baseline, no acute distress    Objective  Vital signs: 137/71, 98.1, 68, 17, 98% blood sugar 100    Physical Exam  Constitutional:       General: He is not in acute distress.  Eyes:      Extraocular Movements: Extraocular movements intact.   Cardiovascular:      Rate and Rhythm: Normal rate and regular rhythm.   Pulmonary:      Effort: Pulmonary effort is normal.      Breath sounds: Normal breath sounds.   Abdominal:      General: Bowel sounds are normal.      Palpations: Abdomen is soft.   Musculoskeletal:      Cervical back: Neck supple.      Right lower leg: No edema.      Left lower leg: No edema.   Neurological:      Mental Status: He is alert.      Comments: A&O X1-2   Psychiatric:         Mood and Affect: Mood normal.         Behavior: Behavior is cooperative.         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  Tamsulosin         Type 2 diabetes mellitus without complication, with long-term current use of insulin (Multi)     Glucoscan  Metformin  Insulin  Monitor labs           Essential hypertension     Monitor blood  pressure  Continue antihypertensive         HLD (hyperlipidemia)     Monitor LFTs, lipids  Statin         Dementia - Primary     Assist with ADLs and care.  Monitor for safety  Aricept  Encourage all abilities           Medications, treatments, and labs reviewed  Continue medications and treatments as listed in EMR    Scribe Attestation  Comfort CHATTERJEE Scribe   attest that this documentation has been prepared under the direction and in the presence of Martín Potts MD    Provider Attestation - Scribe documentation  All medical record entries made by the Scribe were at my direction and personally dictated by me. I have reviewed the chart and agree that the record accurately reflects my personal performance of the history, physical exam, discussion and plan.   Martín Potts MD            Electronically Signed By: Martín Potts MD   11/5/24 10:24 AM

## 2024-11-18 ENCOUNTER — NURSING HOME VISIT (OUTPATIENT)
Dept: POST ACUTE CARE | Facility: EXTERNAL LOCATION | Age: 84
End: 2024-11-18
Payer: COMMERCIAL

## 2024-11-18 DIAGNOSIS — I10 ESSENTIAL HYPERTENSION: ICD-10-CM

## 2024-11-18 DIAGNOSIS — N13.9 BENIGN LOCALIZED HYPERPLASIA OF PROSTATE WITH URINARY OBSTRUCTION AND LOWER URINARY TRACT SYMPTOMS: ICD-10-CM

## 2024-11-18 DIAGNOSIS — N40.1 BENIGN LOCALIZED HYPERPLASIA OF PROSTATE WITH URINARY OBSTRUCTION AND LOWER URINARY TRACT SYMPTOMS: ICD-10-CM

## 2024-11-18 DIAGNOSIS — F03.90 DEMENTIA, UNSPECIFIED DEMENTIA SEVERITY, UNSPECIFIED DEMENTIA TYPE, UNSPECIFIED WHETHER BEHAVIORAL, PSYCHOTIC, OR MOOD DISTURBANCE OR ANXIETY (MULTI): Primary | ICD-10-CM

## 2024-11-18 PROCEDURE — 99308 SBSQ NF CARE LOW MDM 20: CPT | Performed by: INTERNAL MEDICINE

## 2024-11-18 NOTE — LETTER
Patient: Homero Stone  : 1940    Encounter Date: 2024    PROGRESS NOTE    Subjective  Chief complaint: Homero Stone is a 84 y.o. male who is a long term care patient being seen and evaluated for dementia.    HPI:  HPI  Asked to evaluate patient's continued use of Aricept.  Pharmacy recommending Aricept be increased to 10 mg at bedtime for appropriate dosing, BIMS score 2024 was 6.  Agree and increase patient's Aricept to 10 mg at bedtime.    Objective  Vital signs: 110/65, 98.1, 68, 17, 98%    Physical Exam  Constitutional:       General: He is not in acute distress.  Eyes:      Extraocular Movements: Extraocular movements intact.   Cardiovascular:      Rate and Rhythm: Normal rate and regular rhythm.   Pulmonary:      Effort: Pulmonary effort is normal.      Breath sounds: Normal breath sounds.   Abdominal:      General: Bowel sounds are normal.      Palpations: Abdomen is soft.   Musculoskeletal:      Cervical back: Neck supple.      Right lower leg: No edema.      Left lower leg: No edema.   Neurological:      Mental Status: He is alert.      Comments: A&O X1-2   Psychiatric:         Mood and Affect: Mood normal.         Behavior: Behavior is cooperative.         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  Tamsulosin         Essential hypertension     Monitor blood pressure  Continue antihypertensive         Dementia - Primary     Assist with ADLs and care.  Monitor for safety  Aricept increased to 10 mg at bedtime  Encourage all abilities           Medications, treatments, and labs reviewed  Continue medications and treatments as listed in EMR    Scribe Attestation  I, Rashad Lloyd   attest that this documentation has been prepared under the direction and in the presence of Martín Potts MD    Provider Attestation - Scribe  documentation  All medical record entries made by the Scribe were at my direction and personally dictated by me. I have reviewed the chart and agree that the record accurately reflects my personal performance of the history, physical exam, discussion and plan.   Martín Potts MD            Electronically Signed By: Martín Potts MD   11/19/24 11:03 AM

## 2024-11-18 NOTE — ASSESSMENT & PLAN NOTE
Assist with ADLs and care.  Monitor for safety  Aricept increased to 10 mg at bedtime  Encourage all abilities

## 2024-11-18 NOTE — PROGRESS NOTES
PROGRESS NOTE    Subjective   Chief complaint: Homero Stone is a 84 y.o. male who is a long term care patient being seen and evaluated for dementia.    HPI:  HPI  Asked to evaluate patient's continued use of Aricept.  Pharmacy recommending Aricept be increased to 10 mg at bedtime for appropriate dosing, BIMS score July 2024 was 6.  Agree and increase patient's Aricept to 10 mg at bedtime.    Objective   Vital signs: 110/65, 98.1, 68, 17, 98%    Physical Exam  Constitutional:       General: He is not in acute distress.  Eyes:      Extraocular Movements: Extraocular movements intact.   Cardiovascular:      Rate and Rhythm: Normal rate and regular rhythm.   Pulmonary:      Effort: Pulmonary effort is normal.      Breath sounds: Normal breath sounds.   Abdominal:      General: Bowel sounds are normal.      Palpations: Abdomen is soft.   Musculoskeletal:      Cervical back: Neck supple.      Right lower leg: No edema.      Left lower leg: No edema.   Neurological:      Mental Status: He is alert.      Comments: A&O X1-2   Psychiatric:         Mood and Affect: Mood normal.         Behavior: Behavior is cooperative.         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  Tamsulosin         Essential hypertension     Monitor blood pressure  Continue antihypertensive         Dementia - Primary     Assist with ADLs and care.  Monitor for safety  Aricept increased to 10 mg at bedtime  Encourage all abilities           Medications, treatments, and labs reviewed  Continue medications and treatments as listed in EMR    Scribe Attestation  SHENA, Rashad Lloyd   attest that this documentation has been prepared under the direction and in the presence of Martín Potts MD    Provider Attestation - Scribe documentation  All medical record entries made by the Scribe were at my  direction and personally dictated by me. I have reviewed the chart and agree that the record accurately reflects my personal performance of the history, physical exam, discussion and plan.   Martín Potts MD

## 2024-12-02 ENCOUNTER — NURSING HOME VISIT (OUTPATIENT)
Dept: POST ACUTE CARE | Facility: EXTERNAL LOCATION | Age: 84
End: 2024-12-02
Payer: COMMERCIAL

## 2024-12-02 DIAGNOSIS — Z79.4 TYPE 2 DIABETES MELLITUS WITHOUT COMPLICATION, WITH LONG-TERM CURRENT USE OF INSULIN (MULTI): ICD-10-CM

## 2024-12-02 DIAGNOSIS — I10 ESSENTIAL HYPERTENSION: ICD-10-CM

## 2024-12-02 DIAGNOSIS — F03.90 DEMENTIA, UNSPECIFIED DEMENTIA SEVERITY, UNSPECIFIED DEMENTIA TYPE, UNSPECIFIED WHETHER BEHAVIORAL, PSYCHOTIC, OR MOOD DISTURBANCE OR ANXIETY (MULTI): ICD-10-CM

## 2024-12-02 DIAGNOSIS — N40.1 BENIGN LOCALIZED HYPERPLASIA OF PROSTATE WITH URINARY OBSTRUCTION AND LOWER URINARY TRACT SYMPTOMS: Primary | ICD-10-CM

## 2024-12-02 DIAGNOSIS — E11.9 TYPE 2 DIABETES MELLITUS WITHOUT COMPLICATION, WITH LONG-TERM CURRENT USE OF INSULIN (MULTI): ICD-10-CM

## 2024-12-02 DIAGNOSIS — E78.5 HYPERLIPIDEMIA, UNSPECIFIED HYPERLIPIDEMIA TYPE: ICD-10-CM

## 2024-12-02 DIAGNOSIS — N13.9 BENIGN LOCALIZED HYPERPLASIA OF PROSTATE WITH URINARY OBSTRUCTION AND LOWER URINARY TRACT SYMPTOMS: Primary | ICD-10-CM

## 2024-12-02 PROCEDURE — 99309 SBSQ NF CARE MODERATE MDM 30: CPT | Performed by: INTERNAL MEDICINE

## 2024-12-02 NOTE — LETTER
Patient: Homero Stone  : 1940    Encounter Date: 2024    PROGRESS NOTE    Subjective  Chief complaint: Homero Stone is a 84 y.o. male who is a long term care patient being seen and evaluated for monthly general medical care and follow-up    HPI:  HPI  Patient presents for general medical care and f/u.  Patient seen and examined at bedside.  No issues per nursing.  Patient has no acute complaints.  Patient has dementia and requires assist with ADLs.  DM, denies polydipsia polyuria polyphagia.  BPH stable, denies difficulty voiding, urinary frequency, and weak stream.  HTN BP at goal.  Denies chest pain and headache.  HLD denies muscle aches.  Mentation at baseline, no acute distress.    Objective  Vital signs: 100/58, 98.1, 68, 17, 98% blood sugar 101    Physical Exam  Constitutional:       General: He is not in acute distress.  Eyes:      Extraocular Movements: Extraocular movements intact.   Cardiovascular:      Rate and Rhythm: Normal rate and regular rhythm.   Pulmonary:      Effort: Pulmonary effort is normal.      Breath sounds: Normal breath sounds.   Abdominal:      General: Bowel sounds are normal.      Palpations: Abdomen is soft.   Musculoskeletal:      Cervical back: Neck supple.      Right lower leg: No edema.      Left lower leg: No edema.   Neurological:      Mental Status: He is alert.      Comments: A&O X1-2   Psychiatric:         Mood and Affect: Mood normal.         Behavior: Behavior is cooperative.         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  Tamsulosin         Type 2 diabetes mellitus without complication, with long-term current use of insulin (Multi)     Glucoscan  Metformin  Insulin  Monitor labs           Essential hypertension     Monitor blood pressure  Continue antihypertensive         HLD (hyperlipidemia)  Post-Anesthesia Evaluation and Assessment    Patient: Anastasiya Jack MRN: [id number]  SSN: [id number]    YOB: 1948  Age: 80 y.o. Sex: male       Cardiovascular Function/Vital Signs  Visit Vitals    /83 (BP 1 Location: Right arm, BP Patient Position: At rest;Supine)    Pulse 60    Temp 36.5 °C (97.7 °F)    Resp 12    Ht 5' 10\" (1.778 m)    Wt 78.5 kg (173 lb)    SpO2 95%    BMI 24.82 kg/m2       Patient is status post general, total IV anesthesia anesthesia for Procedure(s):  COLONOSCOPY  COLONOSCOPY WITH BIOPSY. Nausea/Vomiting: None    Postoperative hydration reviewed and adequate. Pain:  Pain Scale 1: Numeric (0 - 10) (05/10/17 1221)  Pain Intensity 1: 0 (05/10/17 1221)   Managed    Neurological Status:   Neuro (WDL): Within Defined Limits (05/10/17 1221)  Neuro  Neurologic State: Alert (05/10/17 1221)   At baseline    Mental Status and Level of Consciousness: Arousable    Pulmonary Status:   O2 Device: Room air (05/10/17 1215)   Adequate oxygenation and airway patent    Complications related to anesthesia: None    Post-anesthesia assessment completed.  No concerns    Signed By: Alanna Habermann, MD     May 10, 2017     Monitor LFTs, lipids  Statin         Dementia     Assist with ADLs and care.  Monitor for safety  Aricept  Encourage all abilities           Medications, treatments, and labs reviewed  Continue medications and treatments as listed in EMR    Scribe Attestation  SHENA, Rashad Lloyd   attest that this documentation has been prepared under the direction and in the presence of Martín Potts MD    Provider Attestation - Scribe documentation  All medical record entries made by the Scribe were at my direction and personally dictated by me. I have reviewed the chart and agree that the record accurately reflects my personal performance of the history, physical exam, discussion and plan.   Martín Potts MD            Electronically Signed By: Martín Potts MD   12/3/24 12:29 PM

## 2024-12-02 NOTE — PROGRESS NOTES
PROGRESS NOTE    Subjective   Chief complaint: Homero Stone is a 84 y.o. male who is a long term care patient being seen and evaluated for monthly general medical care and follow-up    HPI:  HPI  Patient presents for general medical care and f/u.  Patient seen and examined at bedside.  No issues per nursing.  Patient has no acute complaints.  Patient has dementia and requires assist with ADLs.  DM, denies polydipsia polyuria polyphagia.  BPH stable, denies difficulty voiding, urinary frequency, and weak stream.  HTN BP at goal.  Denies chest pain and headache.  HLD denies muscle aches.  Mentation at baseline, no acute distress.    Objective   Vital signs: 100/58, 98.1, 68, 17, 98% blood sugar 101    Physical Exam  Constitutional:       General: He is not in acute distress.  Eyes:      Extraocular Movements: Extraocular movements intact.   Cardiovascular:      Rate and Rhythm: Normal rate and regular rhythm.   Pulmonary:      Effort: Pulmonary effort is normal.      Breath sounds: Normal breath sounds.   Abdominal:      General: Bowel sounds are normal.      Palpations: Abdomen is soft.   Musculoskeletal:      Cervical back: Neck supple.      Right lower leg: No edema.      Left lower leg: No edema.   Neurological:      Mental Status: He is alert.      Comments: A&O X1-2   Psychiatric:         Mood and Affect: Mood normal.         Behavior: Behavior is cooperative.         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  Tamsulosin         Type 2 diabetes mellitus without complication, with long-term current use of insulin (Multi)     Glucoscan  Metformin  Insulin  Monitor labs           Essential hypertension     Monitor blood pressure  Continue antihypertensive         HLD (hyperlipidemia)     Monitor LFTs, lipids  Statin         Dementia     Assist with ADLs  and care.  Monitor for safety  Aricept  Encourage all abilities           Medications, treatments, and labs reviewed  Continue medications and treatments as listed in EMR    Scribe Attestation  I, Rashad Lloyd   attest that this documentation has been prepared under the direction and in the presence of Martín Potts MD    Provider Attestation - Scribe documentation  All medical record entries made by the Scribe were at my direction and personally dictated by me. I have reviewed the chart and agree that the record accurately reflects my personal performance of the history, physical exam, discussion and plan.   Martín Potts MD

## 2024-12-04 ENCOUNTER — APPOINTMENT (OUTPATIENT)
Dept: RADIOLOGY | Facility: HOSPITAL | Age: 84
End: 2024-12-04
Payer: COMMERCIAL

## 2024-12-04 ENCOUNTER — HOSPITAL ENCOUNTER (EMERGENCY)
Facility: HOSPITAL | Age: 84
Discharge: HOME | End: 2024-12-04
Attending: STUDENT IN AN ORGANIZED HEALTH CARE EDUCATION/TRAINING PROGRAM
Payer: COMMERCIAL

## 2024-12-04 ENCOUNTER — APPOINTMENT (OUTPATIENT)
Dept: CARDIOLOGY | Facility: HOSPITAL | Age: 84
End: 2024-12-04
Payer: COMMERCIAL

## 2024-12-04 VITALS
SYSTOLIC BLOOD PRESSURE: 124 MMHG | HEART RATE: 72 BPM | TEMPERATURE: 98.2 F | HEIGHT: 67 IN | DIASTOLIC BLOOD PRESSURE: 77 MMHG | WEIGHT: 175 LBS | OXYGEN SATURATION: 100 % | BODY MASS INDEX: 27.47 KG/M2 | RESPIRATION RATE: 18 BRPM
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
## 2024-12-04 DIAGNOSIS — S09.90XA HEAD INJURY, INITIAL ENCOUNTER: ICD-10-CM

## 2024-12-04 LAB
ALBUMIN SERPL BCP-MCNC: 4 G/DL (ref 3.4–5)
ALP SERPL-CCNC: 105 U/L (ref 33–136)
ALT SERPL W P-5'-P-CCNC: 16 U/L (ref 10–52)
ANION GAP SERPL CALC-SCNC: 12 MMOL/L (ref 10–20)
AST SERPL W P-5'-P-CCNC: 20 U/L (ref 9–39)
BASOPHILS # BLD AUTO: 0.03 X10*3/UL (ref 0–0.1)
BASOPHILS NFR BLD AUTO: 0.3 %
BILIRUB SERPL-MCNC: 0.5 MG/DL (ref 0–1.2)
BUN SERPL-MCNC: 11 MG/DL (ref 6–23)
CALCIUM SERPL-MCNC: 9.6 MG/DL (ref 8.6–10.3)
CARDIAC TROPONIN I PNL SERPL HS: 5 NG/L (ref 0–20)
CHLORIDE SERPL-SCNC: 106 MMOL/L (ref 98–107)
CO2 SERPL-SCNC: 28 MMOL/L (ref 21–32)
CREAT SERPL-MCNC: 0.66 MG/DL (ref 0.5–1.3)
EGFRCR SERPLBLD CKD-EPI 2021: >90 ML/MIN/1.73M*2
EOSINOPHIL # BLD AUTO: 0.11 X10*3/UL (ref 0–0.4)
EOSINOPHIL NFR BLD AUTO: 1.1 %
ERYTHROCYTE [DISTWIDTH] IN BLOOD BY AUTOMATED COUNT: 14.6 % (ref 11.5–14.5)
GLUCOSE BLD MANUAL STRIP-MCNC: 138 MG/DL (ref 74–99)
GLUCOSE SERPL-MCNC: 123 MG/DL (ref 74–99)
HCT VFR BLD AUTO: 35.9 % (ref 41–52)
HGB BLD-MCNC: 12.5 G/DL (ref 13.5–17.5)
IMM GRANULOCYTES # BLD AUTO: 0.03 X10*3/UL (ref 0–0.5)
IMM GRANULOCYTES NFR BLD AUTO: 0.3 % (ref 0–0.9)
LYMPHOCYTES # BLD AUTO: 2.26 X10*3/UL (ref 0.8–3)
LYMPHOCYTES NFR BLD AUTO: 23 %
MAGNESIUM SERPL-MCNC: 1.71 MG/DL (ref 1.6–2.4)
MCH RBC QN AUTO: 27.8 PG (ref 26–34)
MCHC RBC AUTO-ENTMCNC: 34.8 G/DL (ref 32–36)
MCV RBC AUTO: 80 FL (ref 80–100)
MONOCYTES # BLD AUTO: 0.75 X10*3/UL (ref 0.05–0.8)
MONOCYTES NFR BLD AUTO: 7.6 %
NEUTROPHILS # BLD AUTO: 6.64 X10*3/UL (ref 1.6–5.5)
NEUTROPHILS NFR BLD AUTO: 67.7 %
NRBC BLD-RTO: 0 /100 WBCS (ref 0–0)
PLATELET # BLD AUTO: 372 X10*3/UL (ref 150–450)
POTASSIUM SERPL-SCNC: 4.1 MMOL/L (ref 3.5–5.3)
PROT SERPL-MCNC: 6.1 G/DL (ref 6.4–8.2)
RBC # BLD AUTO: 4.49 X10*6/UL (ref 4.5–5.9)
SODIUM SERPL-SCNC: 142 MMOL/L (ref 136–145)
WBC # BLD AUTO: 9.8 X10*3/UL (ref 4.4–11.3)

## 2024-12-04 PROCEDURE — 72125 CT NECK SPINE W/O DYE: CPT

## 2024-12-04 PROCEDURE — 82947 ASSAY GLUCOSE BLOOD QUANT: CPT

## 2024-12-04 PROCEDURE — 84484 ASSAY OF TROPONIN QUANT: CPT | Performed by: STUDENT IN AN ORGANIZED HEALTH CARE EDUCATION/TRAINING PROGRAM

## 2024-12-04 PROCEDURE — 72125 CT NECK SPINE W/O DYE: CPT | Performed by: RADIOLOGY

## 2024-12-04 PROCEDURE — 36415 COLL VENOUS BLD VENIPUNCTURE: CPT | Performed by: STUDENT IN AN ORGANIZED HEALTH CARE EDUCATION/TRAINING PROGRAM

## 2024-12-04 PROCEDURE — 80053 COMPREHEN METABOLIC PANEL: CPT | Performed by: STUDENT IN AN ORGANIZED HEALTH CARE EDUCATION/TRAINING PROGRAM

## 2024-12-04 PROCEDURE — 93005 ELECTROCARDIOGRAM TRACING: CPT

## 2024-12-04 PROCEDURE — 70450 CT HEAD/BRAIN W/O DYE: CPT | Performed by: RADIOLOGY

## 2024-12-04 PROCEDURE — 71045 X-RAY EXAM CHEST 1 VIEW: CPT

## 2024-12-04 PROCEDURE — 85025 COMPLETE CBC W/AUTO DIFF WBC: CPT | Performed by: STUDENT IN AN ORGANIZED HEALTH CARE EDUCATION/TRAINING PROGRAM

## 2024-12-04 PROCEDURE — 83735 ASSAY OF MAGNESIUM: CPT | Performed by: STUDENT IN AN ORGANIZED HEALTH CARE EDUCATION/TRAINING PROGRAM

## 2024-12-04 PROCEDURE — 70450 CT HEAD/BRAIN W/O DYE: CPT

## 2024-12-04 PROCEDURE — 71045 X-RAY EXAM CHEST 1 VIEW: CPT | Performed by: RADIOLOGY

## 2024-12-04 PROCEDURE — 99285 EMERGENCY DEPT VISIT HI MDM: CPT | Mod: 25 | Performed by: STUDENT IN AN ORGANIZED HEALTH CARE EDUCATION/TRAINING PROGRAM

## 2024-12-04 ASSESSMENT — PAIN SCALES - GENERAL: PAINLEVEL_OUTOF10: 0 - NO PAIN

## 2024-12-04 ASSESSMENT — PAIN - FUNCTIONAL ASSESSMENT: PAIN_FUNCTIONAL_ASSESSMENT: 0-10

## 2024-12-04 NOTE — ED PROVIDER NOTES
"HPI   Chief Complaint   Patient presents with    Fall       84-year-old male with pertinent past medical history of dementia not on any blood thinners who presents with chief concern of the fall.  Patient is at a care home and when getting out of bed he felt dizzy and fell and hit his head.  He denies any loss of consciousness, felt \"dizzy\" after the fall as well, but denies any chest pain, shortness of breath, neck pain, denies any pain anywhere actually.  Denies any extremity or lower extremity pain.        Patient History   Past Medical History:   Diagnosis Date    BPPV (benign paroxysmal positional vertigo)     Diabetes mellitus (Multi)     HLD (hyperlipidemia)     Hypertension     Pituitary adenoma (Multi)     Prostate cancer (Multi)      Past Surgical History:   Procedure Laterality Date    CT ANGIO NECK  3/2/2017    CT NECK ANGIO W AND WO IV CONTRAST 3/2/2017 [facility] [place] [facility]    CT HEAD ANGIO W AND WO IV CONTRAST  3/2/2017    CT HEAD ANGIO W AND WO IV CONTRAST 3/2/2017 [facility] [place] [facility]     Family History   Problem Relation Name Age of Onset    No Known Problems Mother      No Known Problems Father       Social History     Tobacco Use    Smoking status: Former     Types: Cigarettes    Smokeless tobacco: Never   Substance Use Topics    Alcohol use: Not Currently    Drug use: Never       Physical Exam   ED Triage Vitals   Temp Pulse Resp BP   -- -- -- --      SpO2 Temp src Heart Rate Source Patient Position   -- -- -- --      BP Location FiO2 (%)     -- --       Physical Exam  Vitals and nursing note reviewed.   Constitutional:       Appearance: He is not ill-appearing.   HENT:      Head: Atraumatic.      Comments: No raccoon eyes, no Vaughan sign, no septal hematoma, no epistaxis or intraoral trauma.      Right Ear: Tympanic membrane normal.      Left Ear: Tympanic membrane normal.      Nose: Nose normal.      Mouth/Throat:      Mouth: Mucous membranes are moist.      Pharynx: Oropharynx is clear. " "  Eyes:      Extraocular Movements: Extraocular movements intact.      Pupils: Pupils are equal, round, and reactive to light.   Cardiovascular:      Rate and Rhythm: Normal rate and regular rhythm.      Pulses: Normal pulses.   Pulmonary:      Effort: Pulmonary effort is normal.   Abdominal:      General: Abdomen is flat.   Musculoskeletal:         General: No tenderness.      Cervical back: Neck supple.      Right lower leg: No edema.      Left lower leg: No edema.      Comments: No tenderness to palpation over the cervical thoracic or lumbar spine with no step-offs or deformities.  No tenderness to palpation of the chest wall, abdomen, pelvis pelvis stable to pelvic rock.    No tenderness to palpation of upper or lower extremities.   Skin:     General: Skin is warm and dry.      Capillary Refill: Capillary refill takes less than 2 seconds.   Neurological:      General: No focal deficit present.      Mental Status: He is alert. Mental status is at baseline.      Sensory: No sensory deficit.      Motor: No weakness.      Coordination: Coordination normal.       EKG individual interpretation:  Normal sinus rhythm, rate 77, , QRS 74, QTc 459  Left axis.  No ST segment elevation or depression, no conduction blocks or delays, no abnormal T wave inversions.      ED Course & MDM   ED Course as of 12/04/24 2158   Wed Dec 04, 2024   1014 Patient is an 84-year-old male with pertinent past medical history of dementia not on any blood thinners who presents with chief concern of the fall.  Patient is at a care home and when getting out of bed he felt dizzy and fell and hit his head.  He denies any loss of consciousness, felt \"dizzy\" after the fall as well, but denies any chest pain, shortness of breath, neck pain, denies any pain anywhere actually.  Denies any extremity or lower extremity pain.    On chart review patient was recently seen at his physician 2 days ago with no concerning findings.    On exam, patient is " resting comfortably no acute distress, no obvious trauma to the head, pupils equal and reactive, no nystagmus.  Moving all 4 extremities without difficulty strength and sensation grossly intact.  No tenderness to palpation of the cervical thoracic or lumbar spine, no bruising along the trunk or lower extremities.    Will complete CT imaging of the head, cervical spine, chest x-ray and complete a dizziness workup including troponin electrolytes and magnesium as well as urinalysis. [KK]   1115 XR chest 1 view  IMPRESSION:  No evidence of acute intrathoracic abnormality.   [KK]   1330 CT and x-ray imaging unremarkable.  Will be appropriate for transfer back to his care home. [KK]      ED Course User Index  [KK] Romero Jade DO         Diagnoses as of 12/04/24 2158   Fall, initial encounter   Head injury, initial encounter                 No data recorded     Suleman Coma Scale Score: 15 (12/04/24 1007 : Dm Gutiérrez RN)                   Medical Decision Making  Patient presented after a fall.  He states that when he stood up from his bed he got dizzy and then fell to the ground.  No loss of consciousness.  Was having a difficult time getting up.  On arrival no obvious trauma was noted, vital signs within normal limits, neurologically intact.  Workup including CT imaging of the head cervical spine and chest x-ray with lab work and EKG were unremarkable.  Suspect patient had a vagal event, no concerning findings on imaging or lab work.  Will be appropriate to follow-up outpatient.    Discussed signs and symptoms to return the emergency department, all questions were answered, patient agreeable with disposition and discharged in stable condition.         Procedure  Procedures     Romero Jade DO  12/04/24 2158

## 2024-12-04 NOTE — ED TRIAGE NOTES
Pt presents to ED via EMS after a fall. [facility] is [facility] and today experienced dizziness and tried to catch himself but still fell and hit his head. -LOC, -thinners. VSS. A&Ox3. Early onset dementia.

## 2024-12-05 ENCOUNTER — NURSING HOME VISIT (OUTPATIENT)
Dept: POST ACUTE CARE | Facility: EXTERNAL LOCATION | Age: 84
End: 2024-12-05
Payer: COMMERCIAL

## 2024-12-05 DIAGNOSIS — F03.90 DEMENTIA, UNSPECIFIED DEMENTIA SEVERITY, UNSPECIFIED DEMENTIA TYPE, UNSPECIFIED WHETHER BEHAVIORAL, PSYCHOTIC, OR MOOD DISTURBANCE OR ANXIETY (MULTI): ICD-10-CM

## 2024-12-05 DIAGNOSIS — Z79.4 TYPE 2 DIABETES MELLITUS WITHOUT COMPLICATION, WITH LONG-TERM CURRENT USE OF INSULIN (MULTI): ICD-10-CM

## 2024-12-05 DIAGNOSIS — I10 ESSENTIAL HYPERTENSION: Primary | ICD-10-CM
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
## 2024-12-05 DIAGNOSIS — E11.9 TYPE 2 DIABETES MELLITUS WITHOUT COMPLICATION, WITH LONG-TERM CURRENT USE OF INSULIN (MULTI): ICD-10-CM

## 2024-12-05 PROCEDURE — 99309 SBSQ NF CARE MODERATE MDM 30: CPT | Performed by: INTERNAL MEDICINE

## 2024-12-05 NOTE — PROGRESS NOTES
PROGRESS NOTE    Subjective   Chief complaint: Homero Stone is a 84 y.o. male who is a long term care patient being seen and evaluated for fall.    HPI:  HPI  Patient is seen due to nursing reporting patient had a fall, hit his head and is on anticoagulant.  Patient was sent to ED with full workup without acute findings.  Patient was returned to the facility with no new orders.  Patient does have a small bruise to left forehead.  Patient denies pain.    Objective   Vital signs: 100/60, 97.3, 85, 18, 98% blood sugar 110    Physical Exam  Constitutional:       General: He is not in acute distress.  Eyes:      Extraocular Movements: Extraocular movements intact.   Cardiovascular:      Rate and Rhythm: Normal rate and regular rhythm.   Pulmonary:      Effort: Pulmonary effort is normal.      Breath sounds: Normal breath sounds.   Abdominal:      General: Bowel sounds are normal.      Palpations: Abdomen is soft.   Musculoskeletal:      Cervical back: Neck supple.      Right lower leg: No edema.      Left lower leg: No edema.   Neurological:      Mental Status: He is alert.      Comments: A&O X1-2   Psychiatric:         Mood and Affect: Mood normal.         Behavior: Behavior is cooperative.         Assessment/Plan   Problem List Items Addressed This Visit       Type 2 diabetes mellitus without complication, with long-term current use of insulin (Multi)     Glucoscan  Metformin  Insulin  Monitor labs           Essential hypertension - Primary     Monitor blood pressure  Continue antihypertensive         Dementia     Assist with ADLs and care.  Monitor for safety  Aricept  Encourage all abilities          Fall     Workup in the ED unremarkable          Medications, treatments, and labs reviewed  Continue medications and treatments as listed in EMR    Scribe Attestation  SHENA, Rashad Lloyd   attest that this documentation has been prepared under the direction and in the presence of Martín Potts  MD    Provider Attestation - Scribe documentation  All medical record entries made by the Scribe were at my direction and personally dictated by me. I have reviewed the chart and agree that the record accurately reflects my personal performance of the history, physical exam, discussion and plan.   Martín Potts MD

## 2024-12-05 NOTE — LETTER
Patient: Homero Stone  : 1940    Encounter Date: 2024    PROGRESS NOTE    Subjective  Chief complaint: Homero Stone is a 84 y.o. male who is a long term care patient being seen and evaluated for fall.    HPI:  HPI  Patient is seen due to nursing reporting patient had a fall, hit his head and is on anticoagulant.  Patient was sent to ED with full workup without acute findings.  Patient was returned to the facility with no new orders.  Patient does have a small bruise to left forehead.  Patient denies pain.    Objective  Vital signs: 100/60, 97.3, 85, 18, 98% blood sugar 110    Physical Exam  Constitutional:       General: He is not in acute distress.  Eyes:      Extraocular Movements: Extraocular movements intact.   Cardiovascular:      Rate and Rhythm: Normal rate and regular rhythm.   Pulmonary:      Effort: Pulmonary effort is normal.      Breath sounds: Normal breath sounds.   Abdominal:      General: Bowel sounds are normal.      Palpations: Abdomen is soft.   Musculoskeletal:      Cervical back: Neck supple.      Right lower leg: No edema.      Left lower leg: No edema.   Neurological:      Mental Status: He is alert.      Comments: A&O X1-2   Psychiatric:         Mood and Affect: Mood normal.         Behavior: Behavior is cooperative.         Assessment/Plan  Problem List Items Addressed This Visit       Type 2 diabetes mellitus without complication, with long-term current use of insulin (Multi)     Glucoscan  Metformin  Insulin  Monitor labs           Essential hypertension - Primary     Monitor blood pressure  Continue antihypertensive         Dementia     Assist with ADLs and care.  Monitor for safety  Aricept  Encourage all abilities          Fall     Workup in the ED unremarkable          Medications, treatments, and labs reviewed  Continue medications and treatments as listed in EMR    Scribe Attestation  I, Comfort Emerson, Lylaibgilda   attest that this documentation has been  prepared under the direction and in the presence of Martín Potts MD    Provider Attestation - Scribe documentation  All medical record entries made by the Scribe were at my direction and personally dictated by me. I have reviewed the chart and agree that the record accurately reflects my personal performance of the history, physical exam, discussion and plan.   Martín Potts MD            Electronically Signed By: Martín Potts MD   12/6/24 11:03 AM

## 2024-12-07 LAB
ATRIAL RATE: 77 BPM
P AXIS: 54 DEGREES
P OFFSET: 180 MS
P ONSET: 133 MS
PR INTERVAL: 162 MS
Q ONSET: 214 MS
QRS COUNT: 13 BEATS
QRS DURATION: 74 MS
QT INTERVAL: 406 MS
QTC CALCULATION(BAZETT): 459 MS
QTC FREDERICIA: 441 MS
R AXIS: -40 DEGREES
T AXIS: 33 DEGREES
T OFFSET: 417 MS
VENTRICULAR RATE: 77 BPM